# Patient Record
Sex: MALE | Race: WHITE | Employment: OTHER | URBAN - METROPOLITAN AREA
[De-identification: names, ages, dates, MRNs, and addresses within clinical notes are randomized per-mention and may not be internally consistent; named-entity substitution may affect disease eponyms.]

---

## 2017-11-08 ENCOUNTER — TRANSFERRED RECORDS (OUTPATIENT)
Dept: HEALTH INFORMATION MANAGEMENT | Facility: CLINIC | Age: 82
End: 2017-11-08

## 2018-01-01 ENCOUNTER — APPOINTMENT (OUTPATIENT)
Dept: PHYSICAL THERAPY | Facility: CLINIC | Age: 83
DRG: 689 | End: 2018-01-01
Attending: HOSPITALIST
Payer: MEDICARE

## 2018-01-01 ENCOUNTER — TELEPHONE (OUTPATIENT)
Dept: CARDIOLOGY | Facility: CLINIC | Age: 83
End: 2018-01-01

## 2018-01-01 ENCOUNTER — APPOINTMENT (OUTPATIENT)
Dept: PHYSICAL THERAPY | Facility: CLINIC | Age: 83
DRG: 604 | End: 2018-01-01
Payer: MEDICARE

## 2018-01-01 ENCOUNTER — DOCUMENTATION ONLY (OUTPATIENT)
Dept: CARDIOLOGY | Facility: CLINIC | Age: 83
End: 2018-01-01

## 2018-01-01 ENCOUNTER — RECORDS - HEALTHEAST (OUTPATIENT)
Dept: LAB | Facility: CLINIC | Age: 83
End: 2018-01-01

## 2018-01-01 ENCOUNTER — ALLIED HEALTH/NURSE VISIT (OUTPATIENT)
Dept: CARDIOLOGY | Facility: CLINIC | Age: 83
End: 2018-01-01
Payer: MEDICARE

## 2018-01-01 ENCOUNTER — APPOINTMENT (OUTPATIENT)
Dept: SPEECH THERAPY | Facility: CLINIC | Age: 83
DRG: 604 | End: 2018-01-01
Payer: MEDICARE

## 2018-01-01 ENCOUNTER — APPOINTMENT (OUTPATIENT)
Dept: GENERAL RADIOLOGY | Facility: CLINIC | Age: 83
DRG: 604 | End: 2018-01-01
Attending: PHYSICIAN ASSISTANT
Payer: MEDICARE

## 2018-01-01 ENCOUNTER — OFFICE VISIT (OUTPATIENT)
Dept: CARDIOLOGY | Facility: CLINIC | Age: 83
End: 2018-01-01
Attending: INTERNAL MEDICINE
Payer: MEDICARE

## 2018-01-01 ENCOUNTER — DOCUMENTATION ONLY (OUTPATIENT)
Dept: CARE COORDINATION | Facility: CLINIC | Age: 83
End: 2018-01-01

## 2018-01-01 ENCOUNTER — HOSPITAL ENCOUNTER (INPATIENT)
Facility: CLINIC | Age: 83
LOS: 4 days | Discharge: HOME HEALTH | DRG: 689 | End: 2018-11-29
Attending: EMERGENCY MEDICINE | Admitting: HOSPITALIST
Payer: MEDICARE

## 2018-01-01 ENCOUNTER — TRANSFERRED RECORDS (OUTPATIENT)
Dept: HEALTH INFORMATION MANAGEMENT | Facility: CLINIC | Age: 83
End: 2018-01-01

## 2018-01-01 ENCOUNTER — APPOINTMENT (OUTPATIENT)
Dept: CT IMAGING | Facility: CLINIC | Age: 83
DRG: 604 | End: 2018-01-01
Attending: EMERGENCY MEDICINE
Payer: MEDICARE

## 2018-01-01 ENCOUNTER — APPOINTMENT (OUTPATIENT)
Dept: GENERAL RADIOLOGY | Facility: CLINIC | Age: 83
DRG: 689 | End: 2018-01-01
Attending: EMERGENCY MEDICINE
Payer: MEDICARE

## 2018-01-01 ENCOUNTER — APPOINTMENT (OUTPATIENT)
Dept: SPEECH THERAPY | Facility: CLINIC | Age: 83
DRG: 604 | End: 2018-01-01
Attending: HOSPITALIST
Payer: MEDICARE

## 2018-01-01 ENCOUNTER — APPOINTMENT (OUTPATIENT)
Dept: OCCUPATIONAL THERAPY | Facility: CLINIC | Age: 83
DRG: 604 | End: 2018-01-01
Payer: MEDICARE

## 2018-01-01 ENCOUNTER — HOSPITAL ENCOUNTER (INPATIENT)
Facility: CLINIC | Age: 83
LOS: 6 days | Discharge: HOME HEALTH | DRG: 604 | End: 2018-10-18
Attending: EMERGENCY MEDICINE | Admitting: HOSPITALIST
Payer: MEDICARE

## 2018-01-01 ENCOUNTER — APPOINTMENT (OUTPATIENT)
Dept: PHYSICAL THERAPY | Facility: CLINIC | Age: 83
DRG: 604 | End: 2018-01-01
Attending: HOSPITALIST
Payer: MEDICARE

## 2018-01-01 ENCOUNTER — APPOINTMENT (OUTPATIENT)
Dept: SPEECH THERAPY | Facility: CLINIC | Age: 83
DRG: 689 | End: 2018-01-01
Attending: HOSPITALIST
Payer: MEDICARE

## 2018-01-01 ENCOUNTER — OFFICE VISIT (OUTPATIENT)
Dept: CARDIOLOGY | Facility: CLINIC | Age: 83
End: 2018-01-01
Payer: MEDICARE

## 2018-01-01 ENCOUNTER — APPOINTMENT (OUTPATIENT)
Dept: PHYSICAL THERAPY | Facility: CLINIC | Age: 83
DRG: 689 | End: 2018-01-01
Payer: MEDICARE

## 2018-01-01 ENCOUNTER — CARE COORDINATION (OUTPATIENT)
Dept: CARDIOLOGY | Facility: CLINIC | Age: 83
End: 2018-01-01

## 2018-01-01 ENCOUNTER — APPOINTMENT (OUTPATIENT)
Dept: GENERAL RADIOLOGY | Facility: CLINIC | Age: 83
DRG: 604 | End: 2018-01-01
Attending: EMERGENCY MEDICINE
Payer: MEDICARE

## 2018-01-01 ENCOUNTER — APPOINTMENT (OUTPATIENT)
Dept: GENERAL RADIOLOGY | Facility: CLINIC | Age: 83
DRG: 604 | End: 2018-01-01
Attending: HOSPITALIST
Payer: MEDICARE

## 2018-01-01 ENCOUNTER — APPOINTMENT (OUTPATIENT)
Dept: SPEECH THERAPY | Facility: CLINIC | Age: 83
DRG: 689 | End: 2018-01-01
Payer: MEDICARE

## 2018-01-01 ENCOUNTER — APPOINTMENT (OUTPATIENT)
Dept: OCCUPATIONAL THERAPY | Facility: CLINIC | Age: 83
DRG: 604 | End: 2018-01-01
Attending: HOSPITALIST
Payer: MEDICARE

## 2018-01-01 ENCOUNTER — APPOINTMENT (OUTPATIENT)
Dept: CARDIOLOGY | Facility: CLINIC | Age: 83
DRG: 604 | End: 2018-01-01
Attending: HOSPITALIST
Payer: MEDICARE

## 2018-01-01 VITALS
TEMPERATURE: 96.8 F | HEART RATE: 66 BPM | WEIGHT: 167.55 LBS | OXYGEN SATURATION: 94 % | BODY MASS INDEX: 26.93 KG/M2 | DIASTOLIC BLOOD PRESSURE: 45 MMHG | SYSTOLIC BLOOD PRESSURE: 128 MMHG | RESPIRATION RATE: 16 BRPM | HEIGHT: 66 IN

## 2018-01-01 VITALS
HEART RATE: 77 BPM | BODY MASS INDEX: 18.08 KG/M2 | RESPIRATION RATE: 16 BRPM | OXYGEN SATURATION: 94 % | WEIGHT: 119.3 LBS | SYSTOLIC BLOOD PRESSURE: 106 MMHG | HEIGHT: 68 IN | DIASTOLIC BLOOD PRESSURE: 48 MMHG | TEMPERATURE: 98 F

## 2018-01-01 VITALS
BODY MASS INDEX: 23.22 KG/M2 | DIASTOLIC BLOOD PRESSURE: 60 MMHG | OXYGEN SATURATION: 95 % | WEIGHT: 153.2 LBS | SYSTOLIC BLOOD PRESSURE: 122 MMHG | HEART RATE: 68 BPM | HEIGHT: 68 IN

## 2018-01-01 VITALS
OXYGEN SATURATION: 95 % | DIASTOLIC BLOOD PRESSURE: 64 MMHG | BODY MASS INDEX: 23.9 KG/M2 | HEIGHT: 68 IN | HEART RATE: 70 BPM | SYSTOLIC BLOOD PRESSURE: 146 MMHG | WEIGHT: 157.7 LBS

## 2018-01-01 DIAGNOSIS — I47.29 PAROXYSMAL VT (H): ICD-10-CM

## 2018-01-01 DIAGNOSIS — R29.6 MULTIPLE FALLS: ICD-10-CM

## 2018-01-01 DIAGNOSIS — K59.00 CONSTIPATION, UNSPECIFIED CONSTIPATION TYPE: ICD-10-CM

## 2018-01-01 DIAGNOSIS — R07.81 RIB PAIN ON RIGHT SIDE: Primary | ICD-10-CM

## 2018-01-01 DIAGNOSIS — Z98.890 S/P AAA REPAIR: Primary | ICD-10-CM

## 2018-01-01 DIAGNOSIS — Z86.59 HISTORY OF DEMENTIA: ICD-10-CM

## 2018-01-01 DIAGNOSIS — I25.5 ISCHEMIC CARDIOMYOPATHY: ICD-10-CM

## 2018-01-01 DIAGNOSIS — I71.40 ABDOMINAL AORTIC ANEURYSM (AAA) WITHOUT RUPTURE (H): ICD-10-CM
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
## 2018-01-01 DIAGNOSIS — Z86.79 S/P AAA REPAIR: Primary | ICD-10-CM

## 2018-01-01 DIAGNOSIS — I77.9 CAROTID ARTERY DISEASE, UNSPECIFIED LATERALITY (H): Primary | ICD-10-CM

## 2018-01-01 DIAGNOSIS — N39.0 URINARY TRACT INFECTION WITHOUT HEMATURIA, SITE UNSPECIFIED: ICD-10-CM

## 2018-01-01 DIAGNOSIS — R13.10 DYSPHAGIA, UNSPECIFIED TYPE: ICD-10-CM

## 2018-01-01 DIAGNOSIS — Z95.1 S/P CABG (CORONARY ARTERY BYPASS GRAFT): ICD-10-CM

## 2018-01-01 DIAGNOSIS — R11.0 NAUSEA: ICD-10-CM

## 2018-01-01 DIAGNOSIS — Z95.810 ICD (IMPLANTABLE CARDIOVERTER-DEFIBRILLATOR), DUAL, IN SITU: Primary | ICD-10-CM

## 2018-01-01 DIAGNOSIS — Z95.810 ICD (IMPLANTABLE CARDIOVERTER-DEFIBRILLATOR) IN PLACE: ICD-10-CM

## 2018-01-01 DIAGNOSIS — K21.9 GASTROESOPHAGEAL REFLUX DISEASE WITHOUT ESOPHAGITIS: Primary | ICD-10-CM

## 2018-01-01 DIAGNOSIS — S70.01XA HEMATOMA OF RIGHT HIP, INITIAL ENCOUNTER: ICD-10-CM

## 2018-01-01 DIAGNOSIS — I48.20 CHRONIC ATRIAL FIBRILLATION (H): ICD-10-CM

## 2018-01-01 LAB
ABO + RH BLD: NORMAL
ABO + RH BLD: NORMAL
ALBUMIN SERPL-MCNC: 2.3 G/DL (ref 3.4–5)
ALBUMIN SERPL-MCNC: 2.3 G/DL (ref 3.4–5)
ALBUMIN SERPL-MCNC: 2.4 G/DL (ref 3.5–5)
ALBUMIN UR-MCNC: 10 MG/DL
ALBUMIN UR-MCNC: 10 MG/DL
ALP SERPL-CCNC: 50 U/L (ref 45–120)
ALP SERPL-CCNC: 74 U/L (ref 40–150)
ALP SERPL-CCNC: 85 U/L (ref 40–150)
ALT SERPL W P-5'-P-CCNC: 12 U/L (ref 0–45)
ALT SERPL W P-5'-P-CCNC: 12 U/L (ref 0–70)
ALT SERPL W P-5'-P-CCNC: 30 U/L (ref 0–70)
ANION GAP SERPL CALCULATED.3IONS-SCNC: 10.2 MMOL/L (ref 6–17)
ANION GAP SERPL CALCULATED.3IONS-SCNC: 2 MMOL/L (ref 3–14)
ANION GAP SERPL CALCULATED.3IONS-SCNC: 4 MMOL/L (ref 3–14)
ANION GAP SERPL CALCULATED.3IONS-SCNC: 4 MMOL/L (ref 3–14)
ANION GAP SERPL CALCULATED.3IONS-SCNC: 4 MMOL/L (ref 5–18)
ANION GAP SERPL CALCULATED.3IONS-SCNC: 5 MMOL/L (ref 3–14)
ANION GAP SERPL CALCULATED.3IONS-SCNC: 6 MMOL/L (ref 3–14)
ANION GAP SERPL CALCULATED.3IONS-SCNC: 6 MMOL/L (ref 5–18)
ANION GAP SERPL CALCULATED.3IONS-SCNC: 7 MMOL/L (ref 5–18)
ANION GAP SERPL CALCULATED.3IONS-SCNC: 8 MMOL/L (ref 5–18)
APPEARANCE UR: ABNORMAL
APPEARANCE UR: ABNORMAL
APTT PPP: 33 SEC (ref 22–37)
AST SERPL W P-5'-P-CCNC: 15 U/L (ref 0–45)
AST SERPL W P-5'-P-CCNC: 16 U/L (ref 0–40)
AST SERPL W P-5'-P-CCNC: 26 U/L (ref 0–45)
BACTERIA SPEC CULT: NO GROWTH
BACTERIA SPEC CULT: NO GROWTH
BACTERIA SPEC CULT: NORMAL
BACTERIA SPEC CULT: NORMAL
BASOPHILS # BLD AUTO: 0 10E9/L (ref 0–0.2)
BASOPHILS # BLD AUTO: 0 THOU/UL (ref 0–0.2)
BASOPHILS NFR BLD AUTO: 0 % (ref 0–2)
BASOPHILS NFR BLD AUTO: 0.2 %
BASOPHILS NFR BLD AUTO: 0.3 %
BASOPHILS NFR BLD AUTO: 0.3 %
BILIRUB SERPL-MCNC: 0.2 MG/DL (ref 0.2–1.3)
BILIRUB SERPL-MCNC: 0.4 MG/DL (ref 0.2–1.3)
BILIRUB SERPL-MCNC: 0.6 MG/DL (ref 0–1)
BILIRUB UR QL STRIP: NEGATIVE
BILIRUB UR QL STRIP: NEGATIVE
BLD GP AB SCN SERPL QL: NORMAL
BLD PROD TYP BPU: NORMAL
BLD PROD TYP BPU: NORMAL
BLD UNIT ID BPU: 0
BLOOD BANK CMNT PATIENT-IMP: NORMAL
BLOOD PRODUCT CODE: NORMAL
BPU ID: NORMAL
BUN SERPL-MCNC: 16 MG/DL (ref 7–30)
BUN SERPL-MCNC: 17 MG/DL (ref 8–28)
BUN SERPL-MCNC: 19 MG/DL (ref 7–30)
BUN SERPL-MCNC: 19 MG/DL (ref 7–30)
BUN SERPL-MCNC: 19 MG/DL (ref 8–28)
BUN SERPL-MCNC: 20 MG/DL (ref 7–30)
BUN SERPL-MCNC: 25 MG/DL (ref 8–28)
BUN SERPL-MCNC: 29 MG/DL (ref 7–30)
BUN SERPL-MCNC: 41 MG/DL (ref 7–30)
BUN SERPL-MCNC: 44 MG/DL (ref 7–30)
BUN SERPL-MCNC: 51 MG/DL (ref 7–30)
CALCIUM SERPL-MCNC: 7.9 MG/DL (ref 8.5–10.1)
CALCIUM SERPL-MCNC: 8 MG/DL (ref 8.5–10.1)
CALCIUM SERPL-MCNC: 8.1 MG/DL (ref 8.5–10.1)
CALCIUM SERPL-MCNC: 8.2 MG/DL (ref 8.5–10.1)
CALCIUM SERPL-MCNC: 8.3 MG/DL (ref 8.5–10.1)
CALCIUM SERPL-MCNC: 8.5 MG/DL (ref 8.5–10.5)
CALCIUM SERPL-MCNC: 8.7 MG/DL (ref 8.5–10.5)
CALCIUM SERPL-MCNC: 8.7 MG/DL (ref 8.5–10.5)
CALCIUM SERPL-MCNC: 8.8 MG/DL (ref 8.5–10.5)
CALCIUM SERPL-MCNC: 9 MG/DL (ref 8.5–10.5)
CALCIUM SERPL-MCNC: 9.1 MG/DL (ref 8.5–10.5)
CALCIUM SERPL-MCNC: 9.3 MG/DL (ref 8.5–10.5)
CHLORIDE BLD-SCNC: 102 MMOL/L (ref 98–107)
CHLORIDE BLD-SCNC: 104 MMOL/L (ref 98–107)
CHLORIDE BLD-SCNC: 106 MMOL/L (ref 98–107)
CHLORIDE BLD-SCNC: 107 MMOL/L (ref 98–107)
CHLORIDE BLD-SCNC: 108 MMOL/L (ref 98–107)
CHLORIDE BLD-SCNC: 98 MMOL/L (ref 98–107)
CHLORIDE SERPL-SCNC: 102 MMOL/L (ref 94–109)
CHLORIDE SERPL-SCNC: 104 MMOL/L (ref 98–107)
CHLORIDE SERPL-SCNC: 106 MMOL/L (ref 94–109)
CHLORIDE SERPL-SCNC: 108 MMOL/L (ref 94–109)
CHLORIDE SERPL-SCNC: 108 MMOL/L (ref 94–109)
CHLORIDE SERPL-SCNC: 109 MMOL/L (ref 94–109)
CHLORIDE SERPL-SCNC: 109 MMOL/L (ref 94–109)
CHLORIDE SERPL-SCNC: 113 MMOL/L (ref 94–109)
CK SERPL-CCNC: 47 U/L (ref 30–300)
CO2 SERPL-SCNC: 26 MMOL/L (ref 20–32)
CO2 SERPL-SCNC: 26 MMOL/L (ref 20–32)
CO2 SERPL-SCNC: 27 MMOL/L (ref 20–32)
CO2 SERPL-SCNC: 28 MMOL/L (ref 22–31)
CO2 SERPL-SCNC: 28 MMOL/L (ref 22–31)
CO2 SERPL-SCNC: 31 MMOL/L (ref 22–31)
CO2 SERPL-SCNC: 31 MMOL/L (ref 22–31)
CO2 SERPL-SCNC: 32 MMOL/L (ref 23–29)
CO2 SERPL-SCNC: 33 MMOL/L (ref 20–32)
CO2 SERPL-SCNC: 35 MMOL/L (ref 20–32)
CO2 SERPL-SCNC: 35 MMOL/L (ref 22–31)
CO2 SERPL-SCNC: 37 MMOL/L (ref 20–32)
CO2 SERPL-SCNC: 38 MMOL/L (ref 20–32)
CO2 SERPL-SCNC: 40 MMOL/L (ref 22–31)
COLOR UR AUTO: ABNORMAL
COLOR UR AUTO: YELLOW
CREAT SERPL-MCNC: 0.59 MG/DL (ref 0.7–1.3)
CREAT SERPL-MCNC: 0.67 MG/DL (ref 0.7–1.3)
CREAT SERPL-MCNC: 0.7 MG/DL (ref 0.7–1.3)
CREAT SERPL-MCNC: 0.71 MG/DL (ref 0.66–1.25)
CREAT SERPL-MCNC: 0.72 MG/DL (ref 0.66–1.25)
CREAT SERPL-MCNC: 0.74 MG/DL (ref 0.66–1.25)
CREAT SERPL-MCNC: 0.74 MG/DL (ref 0.7–1.3)
CREAT SERPL-MCNC: 0.75 MG/DL (ref 0.66–1.25)
CREAT SERPL-MCNC: 0.79 MG/DL (ref 0.7–1.3)
CREAT SERPL-MCNC: 0.8 MG/DL (ref 0.66–1.25)
CREAT SERPL-MCNC: 0.81 MG/DL (ref 0.7–1.3)
CREAT SERPL-MCNC: 0.83 MG/DL (ref 0.66–1.25)
CREAT SERPL-MCNC: 0.85 MG/DL (ref 0.66–1.25)
CREAT SERPL-MCNC: 0.91 MG/DL (ref 0.7–1.3)
CREAT SERPL-MCNC: 1.04 MG/DL (ref 0.66–1.25)
CRP SERPL-MCNC: 32.2 MG/L (ref 0–8)
DIFFERENTIAL METHOD BLD: ABNORMAL
EOSINOPHIL # BLD AUTO: 0.3 10E9/L (ref 0–0.7)
EOSINOPHIL # BLD AUTO: 0.4 10E9/L (ref 0–0.7)
EOSINOPHIL # BLD AUTO: 0.4 10E9/L (ref 0–0.7)
EOSINOPHIL # BLD AUTO: 0.8 THOU/UL (ref 0–0.4)
EOSINOPHIL NFR BLD AUTO: 2.6 %
EOSINOPHIL NFR BLD AUTO: 4.5 %
EOSINOPHIL NFR BLD AUTO: 5.7 %
EOSINOPHIL NFR BLD AUTO: 8 % (ref 0–6)
ERYTHROCYTE [DISTWIDTH] IN BLOOD BY AUTOMATED COUNT: 13 % (ref 11–14.5)
ERYTHROCYTE [DISTWIDTH] IN BLOOD BY AUTOMATED COUNT: 13.5 % (ref 10–15)
ERYTHROCYTE [DISTWIDTH] IN BLOOD BY AUTOMATED COUNT: 13.8 % (ref 10–15)
ERYTHROCYTE [DISTWIDTH] IN BLOOD BY AUTOMATED COUNT: 13.9 % (ref 11–14.5)
ERYTHROCYTE [DISTWIDTH] IN BLOOD BY AUTOMATED COUNT: 14 % (ref 11–14.5)
ERYTHROCYTE [DISTWIDTH] IN BLOOD BY AUTOMATED COUNT: 14.5 % (ref 11–14.5)
ERYTHROCYTE [DISTWIDTH] IN BLOOD BY AUTOMATED COUNT: 15.1 % (ref 10–15)
ERYTHROCYTE [DISTWIDTH] IN BLOOD BY AUTOMATED COUNT: 15.2 % (ref 10–15)
ERYTHROCYTE [DISTWIDTH] IN BLOOD BY AUTOMATED COUNT: 15.3 % (ref 11–14.5)
ERYTHROCYTE [DISTWIDTH] IN BLOOD BY AUTOMATED COUNT: 15.6 % (ref 11–14.5)
ERYTHROCYTE [SEDIMENTATION RATE] IN BLOOD BY WESTERGREN METHOD: 27 MM/H (ref 0–20)
FERRITIN SERPL-MCNC: 803 NG/ML (ref 27–300)
GFR SERPL CREATININE-BSD FRML MDRD: 67 ML/MIN/1.7M2
GFR SERPL CREATININE-BSD FRML MDRD: 79 ML/MIN/1.7M2
GFR SERPL CREATININE-BSD FRML MDRD: 85 ML/MIN/1.7M2
GFR SERPL CREATININE-BSD FRML MDRD: 88 ML/MIN/1.7M2
GFR SERPL CREATININE-BSD FRML MDRD: >60 ML/MIN/1.73M2
GFR SERPL CREATININE-BSD FRML MDRD: >90 ML/MIN/1.7M2
GLUCOSE BLD-MCNC: 105 MG/DL (ref 70–125)
GLUCOSE BLD-MCNC: 111 MG/DL (ref 70–125)
GLUCOSE BLD-MCNC: 123 MG/DL (ref 70–125)
GLUCOSE BLD-MCNC: 85 MG/DL (ref 70–125)
GLUCOSE BLD-MCNC: 89 MG/DL (ref 70–125)
GLUCOSE BLD-MCNC: 98 MG/DL (ref 70–125)
GLUCOSE BLDC GLUCOMTR-MCNC: 146 MG/DL (ref 70–99)
GLUCOSE SERPL-MCNC: 101 MG/DL (ref 70–99)
GLUCOSE SERPL-MCNC: 105 MG/DL (ref 70–99)
GLUCOSE SERPL-MCNC: 108 MG/DL (ref 70–99)
GLUCOSE SERPL-MCNC: 119 MG/DL (ref 70–99)
GLUCOSE SERPL-MCNC: 132 MG/DL (ref 70–99)
GLUCOSE SERPL-MCNC: 83 MG/DL (ref 70–99)
GLUCOSE SERPL-MCNC: 87 MG/DL (ref 70–99)
GLUCOSE SERPL-MCNC: 92 MG/DL (ref 70–105)
GLUCOSE UR STRIP-MCNC: NEGATIVE MG/DL
GLUCOSE UR STRIP-MCNC: NEGATIVE MG/DL
HBA1C MFR BLD: 5.7 % (ref 4.2–6.1)
HCT VFR BLD AUTO: 25.3 % (ref 40–53)
HCT VFR BLD AUTO: 25.5 % (ref 40–53)
HCT VFR BLD AUTO: 28.3 % (ref 40–54)
HCT VFR BLD AUTO: 32.2 % (ref 40–54)
HCT VFR BLD AUTO: 32.4 % (ref 40–53)
HCT VFR BLD AUTO: 33 % (ref 40–54)
HCT VFR BLD AUTO: 33.7 % (ref 40–54)
HCT VFR BLD AUTO: 34.8 % (ref 40–53)
HCT VFR BLD AUTO: 37.3 % (ref 40–54)
HCT VFR BLD AUTO: 40.7 % (ref 40–54)
HEMOCCULT STL QL: NEGATIVE
HGB BLD-MCNC: 10.1 G/DL (ref 13.3–17.7)
HGB BLD-MCNC: 10.6 G/DL (ref 14–18)
HGB BLD-MCNC: 10.9 G/DL (ref 13.3–17.7)
HGB BLD-MCNC: 11 G/DL (ref 14–18)
HGB BLD-MCNC: 11.8 G/DL (ref 14–18)
HGB BLD-MCNC: 12.1 G/DL (ref 14–18)
HGB BLD-MCNC: 7.8 G/DL (ref 13.3–17.7)
HGB BLD-MCNC: 7.8 G/DL (ref 13.3–17.7)
HGB BLD-MCNC: 8.4 G/DL (ref 13.3–17.7)
HGB BLD-MCNC: 8.5 G/DL (ref 13.3–17.7)
HGB BLD-MCNC: 8.8 G/DL (ref 13.3–17.7)
HGB BLD-MCNC: 8.8 G/DL (ref 13.3–17.7)
HGB BLD-MCNC: 8.8 G/DL (ref 14–18)
HGB BLD-MCNC: 8.9 G/DL (ref 13.3–17.7)
HGB BLD-MCNC: 8.9 G/DL (ref 13.3–17.7)
HGB BLD-MCNC: 9 G/DL (ref 13.3–17.7)
HGB BLD-MCNC: 9.2 G/DL (ref 13.3–17.7)
HGB BLD-MCNC: 9.3 G/DL (ref 13.3–17.7)
HGB BLD-MCNC: 9.3 G/DL (ref 13.3–17.7)
HGB BLD-MCNC: 9.8 G/DL (ref 14–18)
HGB UR QL STRIP: ABNORMAL
HGB UR QL STRIP: ABNORMAL
HYALINE CASTS #/AREA URNS LPF: 13 /LPF (ref 0–2)
IMM GRANULOCYTES # BLD: 0 10E9/L (ref 0–0.4)
IMM GRANULOCYTES # BLD: 0 10E9/L (ref 0–0.4)
IMM GRANULOCYTES # BLD: 0.1 10E9/L (ref 0–0.4)
IMM GRANULOCYTES NFR BLD: 0.3 %
IMM GRANULOCYTES NFR BLD: 0.4 %
IMM GRANULOCYTES NFR BLD: 0.4 %
INR PPP: 1.18 (ref 0.86–1.14)
INR PPP: 1.23 (ref 0.86–1.14)
INTERPRETATION ECG - MUSE: NORMAL
KETONES UR STRIP-MCNC: NEGATIVE MG/DL
KETONES UR STRIP-MCNC: NEGATIVE MG/DL
LACTATE SERPL-SCNC: 1.3 MMOL/L (ref 0.4–2)
LEUKOCYTE ESTERASE UR QL STRIP: ABNORMAL
LEUKOCYTE ESTERASE UR QL STRIP: ABNORMAL
LYMPHOCYTES # BLD AUTO: 1 10E9/L (ref 0.8–5.3)
LYMPHOCYTES # BLD AUTO: 1.5 10E9/L (ref 0.8–5.3)
LYMPHOCYTES # BLD AUTO: 1.8 10E9/L (ref 0.8–5.3)
LYMPHOCYTES # BLD AUTO: 2.1 THOU/UL (ref 0.8–4.4)
LYMPHOCYTES NFR BLD AUTO: 13.4 %
LYMPHOCYTES NFR BLD AUTO: 15.1 %
LYMPHOCYTES NFR BLD AUTO: 18.7 %
LYMPHOCYTES NFR BLD AUTO: 22 % (ref 20–40)
Lab: NORMAL
MCH RBC QN AUTO: 31.3 PG (ref 27–34)
MCH RBC QN AUTO: 31.9 PG (ref 27–34)
MCH RBC QN AUTO: 32 PG (ref 27–34)
MCH RBC QN AUTO: 32.2 PG (ref 26.5–33)
MCH RBC QN AUTO: 32.3 PG (ref 26.5–33)
MCH RBC QN AUTO: 32.4 PG (ref 26.5–33)
MCH RBC QN AUTO: 32.5 PG (ref 27–34)
MCH RBC QN AUTO: 32.6 PG (ref 26.5–33)
MCH RBC QN AUTO: 32.9 PG (ref 27–34)
MCH RBC QN AUTO: 32.9 PG (ref 27–34)
MCHC RBC AUTO-ENTMCNC: 29.7 G/DL (ref 32–36)
MCHC RBC AUTO-ENTMCNC: 30.4 G/DL (ref 32–36)
MCHC RBC AUTO-ENTMCNC: 31.1 G/DL (ref 32–36)
MCHC RBC AUTO-ENTMCNC: 31.2 G/DL (ref 31.5–36.5)
MCHC RBC AUTO-ENTMCNC: 31.3 G/DL (ref 31.5–36.5)
MCHC RBC AUTO-ENTMCNC: 31.6 G/DL (ref 32–36)
MCHC RBC AUTO-ENTMCNC: 32.1 G/DL (ref 32–36)
MCHC RBC AUTO-ENTMCNC: 32.6 G/DL (ref 32–36)
MCHC RBC AUTO-ENTMCNC: 33.3 G/DL (ref 31.5–36.5)
MCHC RBC AUTO-ENTMCNC: 33.6 G/DL (ref 31.5–36.5)
MCV RBC AUTO: 101 FL (ref 80–100)
MCV RBC AUTO: 103 FL (ref 78–100)
MCV RBC AUTO: 103 FL (ref 80–100)
MCV RBC AUTO: 104 FL (ref 78–100)
MCV RBC AUTO: 105 FL (ref 80–100)
MCV RBC AUTO: 105 FL (ref 80–100)
MCV RBC AUTO: 97 FL (ref 78–100)
MCV RBC AUTO: 97 FL (ref 78–100)
MONOCYTES # BLD AUTO: 0.7 10E9/L (ref 0–1.3)
MONOCYTES # BLD AUTO: 0.7 10E9/L (ref 0–1.3)
MONOCYTES # BLD AUTO: 0.7 THOU/UL (ref 0–0.9)
MONOCYTES # BLD AUTO: 1.2 10E9/L (ref 0–1.3)
MONOCYTES NFR BLD AUTO: 10.5 %
MONOCYTES NFR BLD AUTO: 7 % (ref 2–10)
MONOCYTES NFR BLD AUTO: 8.4 %
MONOCYTES NFR BLD AUTO: 9 %
MUCOUS THREADS #/AREA URNS LPF: PRESENT /LPF
NEUTROPHILS # BLD AUTO: 5.2 10E9/L (ref 1.6–8.3)
NEUTROPHILS # BLD AUTO: 5.4 10E9/L (ref 1.6–8.3)
NEUTROPHILS # BLD AUTO: 5.9 THOU/UL (ref 2–7.7)
NEUTROPHILS # BLD AUTO: 8.4 10E9/L (ref 1.6–8.3)
NEUTROPHILS NFR BLD AUTO: 62 % (ref 50–70)
NEUTROPHILS NFR BLD AUTO: 67.7 %
NEUTROPHILS NFR BLD AUTO: 71.2 %
NEUTROPHILS NFR BLD AUTO: 71.3 %
NITRATE UR QL: NEGATIVE
NITRATE UR QL: NEGATIVE
NRBC # BLD AUTO: 0 10*3/UL
NRBC BLD AUTO-RTO: 0 /100
NT-PROBNP SERPL-MCNC: 1857 PG/ML (ref 0–1800)
NUM BPU REQUESTED: 1
PH UR STRIP: 5 PH (ref 5–7)
PH UR STRIP: 5.5 PH (ref 5–7)
PLATELET # BLD AUTO: 140 10E9/L (ref 150–450)
PLATELET # BLD AUTO: 143 THOU/UL (ref 140–440)
PLATELET # BLD AUTO: 146 THOU/UL (ref 140–440)
PLATELET # BLD AUTO: 152 10E9/L (ref 150–450)
PLATELET # BLD AUTO: 152 THOU/UL (ref 140–440)
PLATELET # BLD AUTO: 153 10E9/L (ref 150–450)
PLATELET # BLD AUTO: 170 THOU/UL (ref 140–440)
PLATELET # BLD AUTO: 172 10E9/L (ref 150–450)
PLATELET # BLD AUTO: 200 THOU/UL (ref 140–440)
PLATELET # BLD AUTO: 224 THOU/UL (ref 140–440)
PMV BLD AUTO: 11.2 FL (ref 8.5–12.5)
PMV BLD AUTO: 11.6 FL (ref 8.5–12.5)
PMV BLD AUTO: 12.1 FL (ref 8.5–12.5)
PMV BLD AUTO: 12.2 FL (ref 8.5–12.5)
PMV BLD AUTO: 12.2 FL (ref 8.5–12.5)
PMV BLD AUTO: 12.9 FL (ref 8.5–12.5)
POTASSIUM BLD-SCNC: 3.3 MMOL/L (ref 3.5–5)
POTASSIUM BLD-SCNC: 3.4 MMOL/L (ref 3.5–5)
POTASSIUM BLD-SCNC: 4 MMOL/L (ref 3.5–5)
POTASSIUM BLD-SCNC: 4 MMOL/L (ref 3.5–5)
POTASSIUM BLD-SCNC: 4.2 MMOL/L (ref 3.5–5)
POTASSIUM BLD-SCNC: 4.3 MMOL/L (ref 3.5–5)
POTASSIUM BLD-SCNC: 4.3 MMOL/L (ref 3.5–5)
POTASSIUM SERPL-SCNC: 3.5 MMOL/L (ref 3.4–5.3)
POTASSIUM SERPL-SCNC: 3.6 MMOL/L (ref 3.4–5.3)
POTASSIUM SERPL-SCNC: 3.8 MMOL/L (ref 3.4–5.3)
POTASSIUM SERPL-SCNC: 4 MMOL/L (ref 3.4–5.3)
POTASSIUM SERPL-SCNC: 4.2 MMOL/L (ref 3.5–5.1)
POTASSIUM SERPL-SCNC: 4.3 MMOL/L (ref 3.4–5.3)
POTASSIUM SERPL-SCNC: 4.3 MMOL/L (ref 3.4–5.3)
POTASSIUM SERPL-SCNC: 4.4 MMOL/L (ref 3.4–5.3)
POTASSIUM SERPL-SCNC: 4.5 MMOL/L (ref 3.4–5.3)
PROT SERPL-MCNC: 5.8 G/DL (ref 6.8–8.8)
PROT SERPL-MCNC: 5.8 G/DL (ref 6–8)
PROT SERPL-MCNC: 6.1 G/DL (ref 6.8–8.8)
RBC # BLD AUTO: 2.61 10E12/L (ref 4.4–5.9)
RBC # BLD AUTO: 2.63 10E12/L (ref 4.4–5.9)
RBC # BLD AUTO: 2.75 MILL/UL (ref 4.4–6.2)
RBC # BLD AUTO: 3.07 MILL/UL (ref 4.4–6.2)
RBC # BLD AUTO: 3.14 10E12/L (ref 4.4–5.9)
RBC # BLD AUTO: 3.22 MILL/UL (ref 4.4–6.2)
RBC # BLD AUTO: 3.34 MILL/UL (ref 4.4–6.2)
RBC # BLD AUTO: 3.36 10E12/L (ref 4.4–5.9)
RBC # BLD AUTO: 3.63 MILL/UL (ref 4.4–6.2)
RBC # BLD AUTO: 3.87 MILL/UL (ref 4.4–6.2)
RBC #/AREA URNS AUTO: 10 /HPF (ref 0–2)
RBC #/AREA URNS AUTO: 46 /HPF (ref 0–2)
SODIUM SERPL-SCNC: 140 MMOL/L (ref 136–145)
SODIUM SERPL-SCNC: 141 MMOL/L (ref 133–144)
SODIUM SERPL-SCNC: 141 MMOL/L (ref 133–144)
SODIUM SERPL-SCNC: 141 MMOL/L (ref 136–145)
SODIUM SERPL-SCNC: 141 MMOL/L (ref 136–145)
SODIUM SERPL-SCNC: 142 MMOL/L (ref 136–145)
SODIUM SERPL-SCNC: 143 MMOL/L (ref 136–145)
SODIUM SERPL-SCNC: 144 MMOL/L (ref 133–144)
SODIUM SERPL-SCNC: 145 MMOL/L (ref 133–144)
SODIUM SERPL-SCNC: 145 MMOL/L (ref 133–144)
SODIUM SERPL-SCNC: 145 MMOL/L (ref 136–145)
SODIUM SERPL-SCNC: 145 MMOL/L (ref 136–145)
SODIUM SERPL-SCNC: 147 MMOL/L (ref 133–144)
SODIUM SERPL-SCNC: 147 MMOL/L (ref 133–144)
SOURCE: ABNORMAL
SOURCE: ABNORMAL
SP GR UR STRIP: 1.01 (ref 1–1.03)
SP GR UR STRIP: 1.01 (ref 1–1.03)
SPECIMEN EXP DATE BLD: NORMAL
SPECIMEN SOURCE: NORMAL
TRANSFUSION STATUS PATIENT QL: NORMAL
TRANSFUSION STATUS PATIENT QL: NORMAL
TROPONIN I SERPL-MCNC: 0.02 UG/L (ref 0–0.04)
TROPONIN I SERPL-MCNC: 0.02 UG/L (ref 0–0.04)
TROPONIN I SERPL-MCNC: 0.05 UG/L (ref 0–0.04)
TROPONIN I SERPL-MCNC: 0.07 UG/L (ref 0–0.04)
TROPONIN I SERPL-MCNC: 0.17 UG/L (ref 0–0.04)
TROPONIN I SERPL-MCNC: 0.55 UG/L (ref 0–0.04)
TROPONIN I SERPL-MCNC: 0.69 UG/L (ref 0–0.04)
TROPONIN I SERPL-MCNC: 0.71 UG/L (ref 0–0.04)
UROBILINOGEN UR STRIP-MCNC: NORMAL MG/DL (ref 0–2)
UROBILINOGEN UR STRIP-MCNC: NORMAL MG/DL (ref 0–2)
WBC # BLD AUTO: 11.7 10E9/L (ref 4–11)
WBC # BLD AUTO: 11.8 10E9/L (ref 4–11)
WBC # BLD AUTO: 7.3 10E9/L (ref 4–11)
WBC # BLD AUTO: 8 10E9/L (ref 4–11)
WBC #/AREA URNS AUTO: 153 /HPF (ref 0–5)
WBC #/AREA URNS AUTO: >182 /HPF (ref 0–5)
WBC CLUMPS #/AREA URNS HPF: PRESENT /HPF
WBC: 11.7 THOU/UL (ref 4–11)
WBC: 5.7 THOU/UL (ref 4–11)
WBC: 7.9 THOU/UL (ref 4–11)
WBC: 8.3 THOU/UL (ref 4–11)
WBC: 9.6 THOU/UL (ref 4–11)
WBC: 9.8 THOU/UL (ref 4–11)

## 2018-01-01 PROCEDURE — 84484 ASSAY OF TROPONIN QUANT: CPT | Performed by: HOSPITALIST

## 2018-01-01 PROCEDURE — 25000125 ZZHC RX 250: Performed by: EMERGENCY MEDICINE

## 2018-01-01 PROCEDURE — 25000128 H RX IP 250 OP 636: Performed by: HOSPITALIST

## 2018-01-01 PROCEDURE — A9270 NON-COVERED ITEM OR SERVICE: HCPCS | Mod: GY | Performed by: INTERNAL MEDICINE

## 2018-01-01 PROCEDURE — A9270 NON-COVERED ITEM OR SERVICE: HCPCS | Mod: GY | Performed by: HOSPITALIST

## 2018-01-01 PROCEDURE — 92526 ORAL FUNCTION THERAPY: CPT | Mod: GN | Performed by: SPEECH-LANGUAGE PATHOLOGIST

## 2018-01-01 PROCEDURE — A9270 NON-COVERED ITEM OR SERVICE: HCPCS | Performed by: EMERGENCY MEDICINE

## 2018-01-01 PROCEDURE — 25000131 ZZH RX MED GY IP 250 OP 636 PS 637: Mod: GY | Performed by: HOSPITALIST

## 2018-01-01 PROCEDURE — 21000001 ZZH R&B HEART CARE

## 2018-01-01 PROCEDURE — 40000193 ZZH STATISTIC PT WARD VISIT: Performed by: PHYSICAL THERAPIST

## 2018-01-01 PROCEDURE — 00000146 ZZHCL STATISTIC GLUCOSE BY METER IP

## 2018-01-01 PROCEDURE — 84484 ASSAY OF TROPONIN QUANT: CPT | Performed by: INTERNAL MEDICINE

## 2018-01-01 PROCEDURE — 25000132 ZZH RX MED GY IP 250 OP 250 PS 637: Performed by: HOSPITALIST

## 2018-01-01 PROCEDURE — 85018 HEMOGLOBIN: CPT | Performed by: HOSPITALIST

## 2018-01-01 PROCEDURE — 25500064 ZZH RX 255 OP 636: Performed by: HOSPITALIST

## 2018-01-01 PROCEDURE — 25000132 ZZH RX MED GY IP 250 OP 250 PS 637: Mod: GY | Performed by: PHYSICIAN ASSISTANT

## 2018-01-01 PROCEDURE — 80053 COMPREHEN METABOLIC PANEL: CPT | Performed by: HOSPITALIST

## 2018-01-01 PROCEDURE — 99207 ZZC CDG-MDM COMPONENT: MEETS MODERATE - UP CODED: CPT | Performed by: PHYSICIAN ASSISTANT

## 2018-01-01 PROCEDURE — 40000225 ZZH STATISTIC SLP WARD VISIT: Performed by: SPEECH-LANGUAGE PATHOLOGIST

## 2018-01-01 PROCEDURE — 36415 COLL VENOUS BLD VENIPUNCTURE: CPT | Performed by: HOSPITALIST

## 2018-01-01 PROCEDURE — 97161 PT EVAL LOW COMPLEX 20 MIN: CPT | Mod: GP | Performed by: PHYSICAL THERAPIST

## 2018-01-01 PROCEDURE — 93306 TTE W/DOPPLER COMPLETE: CPT

## 2018-01-01 PROCEDURE — 99358 PROLONG SERVICE W/O CONTACT: CPT | Performed by: NURSE PRACTITIONER

## 2018-01-01 PROCEDURE — 85027 COMPLETE CBC AUTOMATED: CPT | Performed by: HOSPITALIST

## 2018-01-01 PROCEDURE — 25000132 ZZH RX MED GY IP 250 OP 250 PS 637: Mod: GY | Performed by: HOSPITALIST

## 2018-01-01 PROCEDURE — 25000132 ZZH RX MED GY IP 250 OP 250 PS 637: Mod: GY | Performed by: INTERNAL MEDICINE

## 2018-01-01 PROCEDURE — 85025 COMPLETE CBC W/AUTO DIFF WBC: CPT | Performed by: EMERGENCY MEDICINE

## 2018-01-01 PROCEDURE — 80048 BASIC METABOLIC PNL TOTAL CA: CPT | Performed by: HOSPITALIST

## 2018-01-01 PROCEDURE — 86850 RBC ANTIBODY SCREEN: CPT | Performed by: INTERNAL MEDICINE

## 2018-01-01 PROCEDURE — 73502 X-RAY EXAM HIP UNI 2-3 VIEWS: CPT

## 2018-01-01 PROCEDURE — 93306 TTE W/DOPPLER COMPLETE: CPT | Mod: 26 | Performed by: INTERNAL MEDICINE

## 2018-01-01 PROCEDURE — 97116 GAIT TRAINING THERAPY: CPT | Mod: GP | Performed by: PHYSICAL THERAPIST

## 2018-01-01 PROCEDURE — 82272 OCCULT BLD FECES 1-3 TESTS: CPT | Performed by: EMERGENCY MEDICINE

## 2018-01-01 PROCEDURE — 97530 THERAPEUTIC ACTIVITIES: CPT | Mod: GP | Performed by: PHYSICAL THERAPIST

## 2018-01-01 PROCEDURE — 99207 ZZC CDG-MDM COMPONENT: MEETS MODERATE - UP CODED: CPT | Performed by: HOSPITALIST

## 2018-01-01 PROCEDURE — 40000193 ZZH STATISTIC PT WARD VISIT

## 2018-01-01 PROCEDURE — 12000000 ZZH R&B MED SURG/OB

## 2018-01-01 PROCEDURE — 97162 PT EVAL MOD COMPLEX 30 MIN: CPT | Mod: GP

## 2018-01-01 PROCEDURE — 71260 CT THORAX DX C+: CPT

## 2018-01-01 PROCEDURE — 99223 1ST HOSP IP/OBS HIGH 75: CPT | Performed by: NURSE PRACTITIONER

## 2018-01-01 PROCEDURE — 81001 URINALYSIS AUTO W/SCOPE: CPT | Performed by: EMERGENCY MEDICINE

## 2018-01-01 PROCEDURE — 25000128 H RX IP 250 OP 636: Performed by: EMERGENCY MEDICINE

## 2018-01-01 PROCEDURE — P9016 RBC LEUKOCYTES REDUCED: HCPCS | Performed by: INTERNAL MEDICINE

## 2018-01-01 PROCEDURE — 80048 BASIC METABOLIC PNL TOTAL CA: CPT | Performed by: INTERNAL MEDICINE

## 2018-01-01 PROCEDURE — 73552 X-RAY EXAM OF FEMUR 2/>: CPT | Mod: RT

## 2018-01-01 PROCEDURE — 99285 EMERGENCY DEPT VISIT HI MDM: CPT | Mod: 25

## 2018-01-01 PROCEDURE — 99223 1ST HOSP IP/OBS HIGH 75: CPT | Mod: AI | Performed by: HOSPITALIST

## 2018-01-01 PROCEDURE — 70450 CT HEAD/BRAIN W/O DYE: CPT

## 2018-01-01 PROCEDURE — 96365 THER/PROPH/DIAG IV INF INIT: CPT | Mod: 59

## 2018-01-01 PROCEDURE — 99207 ZZC CDG-CHARGE REQUIRED MANUAL ENTRY: CPT | Performed by: INTERNAL MEDICINE

## 2018-01-01 PROCEDURE — 96375 TX/PRO/DX INJ NEW DRUG ADDON: CPT

## 2018-01-01 PROCEDURE — 97530 THERAPEUTIC ACTIVITIES: CPT | Mod: GP

## 2018-01-01 PROCEDURE — 85730 THROMBOPLASTIN TIME PARTIAL: CPT | Performed by: EMERGENCY MEDICINE

## 2018-01-01 PROCEDURE — 71045 X-RAY EXAM CHEST 1 VIEW: CPT

## 2018-01-01 PROCEDURE — 72100 X-RAY EXAM L-S SPINE 2/3 VWS: CPT

## 2018-01-01 PROCEDURE — 85610 PROTHROMBIN TIME: CPT | Performed by: EMERGENCY MEDICINE

## 2018-01-01 PROCEDURE — 40000225 ZZH STATISTIC SLP WARD VISIT

## 2018-01-01 PROCEDURE — A9270 NON-COVERED ITEM OR SERVICE: HCPCS | Mod: GY | Performed by: PHYSICIAN ASSISTANT

## 2018-01-01 PROCEDURE — A9270 NON-COVERED ITEM OR SERVICE: HCPCS | Performed by: HOSPITALIST

## 2018-01-01 PROCEDURE — 99207 ZZC CDG-MDM COMPONENT: MEETS LOW - DOWN CODED: CPT | Performed by: PHYSICIAN ASSISTANT

## 2018-01-01 PROCEDURE — 40000133 ZZH STATISTIC OT WARD VISIT

## 2018-01-01 PROCEDURE — 85652 RBC SED RATE AUTOMATED: CPT | Performed by: EMERGENCY MEDICINE

## 2018-01-01 PROCEDURE — 25000132 ZZH RX MED GY IP 250 OP 250 PS 637: Performed by: EMERGENCY MEDICINE

## 2018-01-01 PROCEDURE — 97166 OT EVAL MOD COMPLEX 45 MIN: CPT | Mod: GO

## 2018-01-01 PROCEDURE — 97535 SELF CARE MNGMENT TRAINING: CPT | Mod: GO

## 2018-01-01 PROCEDURE — 84132 ASSAY OF SERUM POTASSIUM: CPT | Performed by: HOSPITALIST

## 2018-01-01 PROCEDURE — 97530 THERAPEUTIC ACTIVITIES: CPT | Mod: GO

## 2018-01-01 PROCEDURE — 80053 COMPREHEN METABOLIC PANEL: CPT | Performed by: EMERGENCY MEDICINE

## 2018-01-01 PROCEDURE — 92611 MOTION FLUOROSCOPY/SWALLOW: CPT | Mod: GN | Performed by: SPEECH-LANGUAGE PATHOLOGIST

## 2018-01-01 PROCEDURE — 82565 ASSAY OF CREATININE: CPT | Performed by: HOSPITALIST

## 2018-01-01 PROCEDURE — 25000132 ZZH RX MED GY IP 250 OP 250 PS 637: Mod: GY

## 2018-01-01 PROCEDURE — 96365 THER/PROPH/DIAG IV INF INIT: CPT

## 2018-01-01 PROCEDURE — 93000 ELECTROCARDIOGRAM COMPLETE: CPT | Performed by: INTERNAL MEDICINE

## 2018-01-01 PROCEDURE — 36415 COLL VENOUS BLD VENIPUNCTURE: CPT

## 2018-01-01 PROCEDURE — 92526 ORAL FUNCTION THERAPY: CPT | Mod: GN

## 2018-01-01 PROCEDURE — 36415 COLL VENOUS BLD VENIPUNCTURE: CPT | Performed by: EMERGENCY MEDICINE

## 2018-01-01 PROCEDURE — 87086 URINE CULTURE/COLONY COUNT: CPT | Performed by: EMERGENCY MEDICINE

## 2018-01-01 PROCEDURE — 80048 BASIC METABOLIC PNL TOTAL CA: CPT | Performed by: EMERGENCY MEDICINE

## 2018-01-01 PROCEDURE — 85025 COMPLETE CBC W/AUTO DIFF WBC: CPT | Performed by: HOSPITALIST

## 2018-01-01 PROCEDURE — 93010 ELECTROCARDIOGRAM REPORT: CPT | Performed by: INTERNAL MEDICINE

## 2018-01-01 PROCEDURE — 84484 ASSAY OF TROPONIN QUANT: CPT | Performed by: EMERGENCY MEDICINE

## 2018-01-01 PROCEDURE — 93005 ELECTROCARDIOGRAM TRACING: CPT

## 2018-01-01 PROCEDURE — 99233 SBSQ HOSP IP/OBS HIGH 50: CPT | Performed by: HOSPITALIST

## 2018-01-01 PROCEDURE — 25000128 H RX IP 250 OP 636: Performed by: INTERNAL MEDICINE

## 2018-01-01 PROCEDURE — 99205 OFFICE O/P NEW HI 60 MIN: CPT | Performed by: INTERNAL MEDICINE

## 2018-01-01 PROCEDURE — 40000894 ZZH STATISTIC OT IP EVAL DEFER: Performed by: OCCUPATIONAL THERAPIST

## 2018-01-01 PROCEDURE — 93283 PRGRMG EVAL IMPLANTABLE DFB: CPT | Performed by: INTERNAL MEDICINE

## 2018-01-01 PROCEDURE — 36415 COLL VENOUS BLD VENIPUNCTURE: CPT | Performed by: INTERNAL MEDICINE

## 2018-01-01 PROCEDURE — 99214 OFFICE O/P EST MOD 30 MIN: CPT | Performed by: PHYSICIAN ASSISTANT

## 2018-01-01 PROCEDURE — 99239 HOSP IP/OBS DSCHRG MGMT >30: CPT | Performed by: PHYSICIAN ASSISTANT

## 2018-01-01 PROCEDURE — 99232 SBSQ HOSP IP/OBS MODERATE 35: CPT | Performed by: PHYSICIAN ASSISTANT

## 2018-01-01 PROCEDURE — 25000125 ZZHC RX 250: Performed by: HOSPITALIST

## 2018-01-01 PROCEDURE — 73700 CT LOWER EXTREMITY W/O DYE: CPT | Mod: RT

## 2018-01-01 PROCEDURE — 99239 HOSP IP/OBS DSCHRG MGMT >30: CPT | Performed by: HOSPITALIST

## 2018-01-01 PROCEDURE — 92610 EVALUATE SWALLOWING FUNCTION: CPT | Mod: GN

## 2018-01-01 PROCEDURE — 87040 BLOOD CULTURE FOR BACTERIA: CPT | Performed by: EMERGENCY MEDICINE

## 2018-01-01 PROCEDURE — 82550 ASSAY OF CK (CPK): CPT | Performed by: EMERGENCY MEDICINE

## 2018-01-01 PROCEDURE — 86923 COMPATIBILITY TEST ELECTRIC: CPT | Performed by: INTERNAL MEDICINE

## 2018-01-01 PROCEDURE — 83605 ASSAY OF LACTIC ACID: CPT | Performed by: EMERGENCY MEDICINE

## 2018-01-01 PROCEDURE — 99233 SBSQ HOSP IP/OBS HIGH 50: CPT | Performed by: INTERNAL MEDICINE

## 2018-01-01 PROCEDURE — 86900 BLOOD TYPING SEROLOGIC ABO: CPT | Performed by: INTERNAL MEDICINE

## 2018-01-01 PROCEDURE — 86140 C-REACTIVE PROTEIN: CPT | Performed by: EMERGENCY MEDICINE

## 2018-01-01 PROCEDURE — 83880 ASSAY OF NATRIURETIC PEPTIDE: CPT | Performed by: EMERGENCY MEDICINE

## 2018-01-01 PROCEDURE — 86901 BLOOD TYPING SEROLOGIC RH(D): CPT | Performed by: INTERNAL MEDICINE

## 2018-01-01 PROCEDURE — A9270 NON-COVERED ITEM OR SERVICE: HCPCS | Mod: GY

## 2018-01-01 PROCEDURE — 74230 X-RAY XM SWLNG FUNCJ C+: CPT

## 2018-01-01 PROCEDURE — 99233 SBSQ HOSP IP/OBS HIGH 50: CPT | Performed by: PHYSICIAN ASSISTANT

## 2018-01-01 PROCEDURE — 99222 1ST HOSP IP/OBS MODERATE 55: CPT | Performed by: INTERNAL MEDICINE

## 2018-01-01 PROCEDURE — 40000916 ZZH STATISTIC SITTER, NIGHT HOURS

## 2018-01-01 PROCEDURE — 99232 SBSQ HOSP IP/OBS MODERATE 35: CPT | Performed by: HOSPITALIST

## 2018-01-01 PROCEDURE — 99232 SBSQ HOSP IP/OBS MODERATE 35: CPT | Performed by: INTERNAL MEDICINE

## 2018-01-01 RX ORDER — ATORVASTATIN CALCIUM 10 MG/1
10 TABLET, FILM COATED ORAL DAILY
Status: DISCONTINUED | OUTPATIENT
Start: 2018-01-01 | End: 2018-01-01 | Stop reason: HOSPADM

## 2018-01-01 RX ORDER — SUCRALFATE 1 G/1
1 TABLET ORAL 4 TIMES DAILY
Qty: 40 TABLET | Refills: 1 | Status: SHIPPED | OUTPATIENT
Start: 2018-01-01

## 2018-01-01 RX ORDER — ALUMINA, MAGNESIA, AND SIMETHICONE 2400; 2400; 240 MG/30ML; MG/30ML; MG/30ML
30 SUSPENSION ORAL EVERY 4 HOURS PRN
Status: DISCONTINUED | OUTPATIENT
Start: 2018-01-01 | End: 2018-01-01 | Stop reason: HOSPADM

## 2018-01-01 RX ORDER — POLYETHYLENE GLYCOL 3350 17 G/17G
17 POWDER, FOR SOLUTION ORAL DAILY
Status: DISCONTINUED | OUTPATIENT
Start: 2018-01-01 | End: 2018-01-01 | Stop reason: HOSPADM

## 2018-01-01 RX ORDER — RIVASTIGMINE TARTRATE 1.5 MG/1
1.5 CAPSULE ORAL 2 TIMES DAILY WITH MEALS
Status: DISCONTINUED | OUTPATIENT
Start: 2018-01-01 | End: 2018-01-01 | Stop reason: HOSPADM

## 2018-01-01 RX ORDER — FUROSEMIDE 20 MG
20 TABLET ORAL DAILY PRN
Qty: 90 TABLET | Refills: 0 | Status: ON HOLD | OUTPATIENT
Start: 2018-01-01 | End: 2018-01-01

## 2018-01-01 RX ORDER — ONDANSETRON 2 MG/ML
4 INJECTION INTRAMUSCULAR; INTRAVENOUS EVERY 6 HOURS PRN
Status: DISCONTINUED | OUTPATIENT
Start: 2018-01-01 | End: 2018-01-01 | Stop reason: HOSPADM

## 2018-01-01 RX ORDER — POLYETHYLENE GLYCOL 3350 17 G/17G
17 POWDER, FOR SOLUTION ORAL DAILY
Qty: 30 PACKET | Refills: 0 | Status: SHIPPED | OUTPATIENT
Start: 2018-01-01

## 2018-01-01 RX ORDER — FUROSEMIDE 20 MG
20 TABLET ORAL DAILY
Qty: 90 TABLET | Refills: 3 | Status: ON HOLD | OUTPATIENT
Start: 2018-01-01 | End: 2018-01-01

## 2018-01-01 RX ORDER — SCOLOPAMINE TRANSDERMAL SYSTEM 1 MG/1
1 PATCH, EXTENDED RELEASE TRANSDERMAL
Status: DISCONTINUED | OUTPATIENT
Start: 2018-01-01 | End: 2018-01-01

## 2018-01-01 RX ORDER — FUROSEMIDE 10 MG/ML
20 INJECTION INTRAMUSCULAR; INTRAVENOUS ONCE
Status: COMPLETED | OUTPATIENT
Start: 2018-01-01 | End: 2018-01-01

## 2018-01-01 RX ORDER — PROCHLORPERAZINE 25 MG
12.5 SUPPOSITORY, RECTAL RECTAL EVERY 12 HOURS PRN
Status: DISCONTINUED | OUTPATIENT
Start: 2018-01-01 | End: 2018-01-01

## 2018-01-01 RX ORDER — ONDANSETRON 4 MG/1
8 TABLET, ORALLY DISINTEGRATING ORAL EVERY 6 HOURS PRN
Status: DISCONTINUED | OUTPATIENT
Start: 2018-01-01 | End: 2018-01-01 | Stop reason: HOSPADM

## 2018-01-01 RX ORDER — ASPIRIN 81 MG/1
81 TABLET ORAL DAILY
Status: DISCONTINUED | OUTPATIENT
Start: 2018-01-01 | End: 2018-01-01 | Stop reason: HOSPADM

## 2018-01-01 RX ORDER — IOPAMIDOL 755 MG/ML
71 INJECTION, SOLUTION INTRAVASCULAR ONCE
Status: COMPLETED | OUTPATIENT
Start: 2018-01-01 | End: 2018-01-01

## 2018-01-01 RX ORDER — METOCLOPRAMIDE HYDROCHLORIDE 5 MG/ML
5 INJECTION INTRAMUSCULAR; INTRAVENOUS EVERY 6 HOURS PRN
Status: DISCONTINUED | OUTPATIENT
Start: 2018-01-01 | End: 2018-01-01 | Stop reason: HOSPADM

## 2018-01-01 RX ORDER — ACETAMINOPHEN 325 MG/1
650 TABLET ORAL EVERY 4 HOURS PRN
Status: DISCONTINUED | OUTPATIENT
Start: 2018-01-01 | End: 2018-01-01 | Stop reason: HOSPADM

## 2018-01-01 RX ORDER — POTASSIUM CHLORIDE 7.45 MG/ML
10 INJECTION INTRAVENOUS
Status: DISCONTINUED | OUTPATIENT
Start: 2018-01-01 | End: 2018-01-01 | Stop reason: HOSPADM

## 2018-01-01 RX ORDER — FLUORIDE TOOTHPASTE
15 TOOTHPASTE DENTAL 4 TIMES DAILY
Status: DISCONTINUED | OUTPATIENT
Start: 2018-01-01 | End: 2018-01-01 | Stop reason: HOSPADM

## 2018-01-01 RX ORDER — CEFTRIAXONE 1 G/1
1 INJECTION, POWDER, FOR SOLUTION INTRAMUSCULAR; INTRAVENOUS EVERY 24 HOURS
Status: DISCONTINUED | OUTPATIENT
Start: 2018-01-01 | End: 2018-01-01

## 2018-01-01 RX ORDER — ONDANSETRON 4 MG/1
8 TABLET, ORALLY DISINTEGRATING ORAL EVERY 8 HOURS PRN
Qty: 20 TABLET | Refills: 0 | Status: ON HOLD | OUTPATIENT
Start: 2018-01-01 | End: 2018-01-01

## 2018-01-01 RX ORDER — LISINOPRIL 2.5 MG/1
2.5 TABLET ORAL DAILY
Status: DISCONTINUED | OUTPATIENT
Start: 2018-01-01 | End: 2018-01-01 | Stop reason: HOSPADM

## 2018-01-01 RX ORDER — POTASSIUM CHLORIDE 750 MG/1
10 TABLET, EXTENDED RELEASE ORAL EVERY EVENING
Status: ON HOLD | COMMUNITY
End: 2018-01-01

## 2018-01-01 RX ORDER — POLYETHYLENE GLYCOL 3350 17 G/17G
17 POWDER, FOR SOLUTION ORAL DAILY PRN
Status: DISCONTINUED | OUTPATIENT
Start: 2018-01-01 | End: 2018-01-01 | Stop reason: HOSPADM

## 2018-01-01 RX ORDER — FUROSEMIDE 20 MG
20 TABLET ORAL DAILY
Status: DISCONTINUED | OUTPATIENT
Start: 2018-01-01 | End: 2018-01-01

## 2018-01-01 RX ORDER — LISINOPRIL 2.5 MG/1
2.5 TABLET ORAL EVERY EVENING
Status: DISCONTINUED | OUTPATIENT
Start: 2018-01-01 | End: 2018-01-01 | Stop reason: HOSPADM

## 2018-01-01 RX ORDER — SUCRALFATE ORAL 1 G/10ML
1 SUSPENSION ORAL
Status: DISCONTINUED | OUTPATIENT
Start: 2018-01-01 | End: 2018-01-01 | Stop reason: HOSPADM

## 2018-01-01 RX ORDER — POTASSIUM CHLORIDE 1.5 G/1.58G
20-40 POWDER, FOR SOLUTION ORAL
Status: DISCONTINUED | OUTPATIENT
Start: 2018-01-01 | End: 2018-01-01 | Stop reason: HOSPADM

## 2018-01-01 RX ORDER — PROCHLORPERAZINE MALEATE 5 MG
5 TABLET ORAL EVERY 6 HOURS PRN
Status: DISCONTINUED | OUTPATIENT
Start: 2018-01-01 | End: 2018-01-01

## 2018-01-01 RX ORDER — FUROSEMIDE 20 MG
20 TABLET ORAL DAILY
Qty: 90 TABLET | Refills: 0 | Status: SHIPPED | OUTPATIENT
Start: 2018-01-01

## 2018-01-01 RX ORDER — ASPIRIN 81 MG
100 TABLET, DELAYED RELEASE (ENTERIC COATED) ORAL EVERY EVENING
COMMUNITY

## 2018-01-01 RX ORDER — CARVEDILOL 3.12 MG/1
3.12 TABLET ORAL 2 TIMES DAILY WITH MEALS
Status: DISCONTINUED | OUTPATIENT
Start: 2018-01-01 | End: 2018-01-01 | Stop reason: HOSPADM

## 2018-01-01 RX ORDER — TRAZODONE HYDROCHLORIDE 50 MG/1
50 TABLET, FILM COATED ORAL AT BEDTIME
COMMUNITY

## 2018-01-01 RX ORDER — TRAZODONE HYDROCHLORIDE 50 MG/1
50 TABLET, FILM COATED ORAL AT BEDTIME
Status: DISCONTINUED | OUTPATIENT
Start: 2018-01-01 | End: 2018-01-01 | Stop reason: HOSPADM

## 2018-01-01 RX ORDER — LISINOPRIL 2.5 MG/1
2.5 TABLET ORAL DAILY
Qty: 90 TABLET | Refills: 3 | Status: SHIPPED | OUTPATIENT
Start: 2018-01-01

## 2018-01-01 RX ORDER — CARVEDILOL 3.12 MG/1
3.12 TABLET ORAL 2 TIMES DAILY WITH MEALS
COMMUNITY
End: 2018-01-01

## 2018-01-01 RX ORDER — POTASSIUM CHLORIDE 1500 MG/1
20-40 TABLET, EXTENDED RELEASE ORAL
Status: DISCONTINUED | OUTPATIENT
Start: 2018-01-01 | End: 2018-01-01 | Stop reason: HOSPADM

## 2018-01-01 RX ORDER — POTASSIUM CHLORIDE 1500 MG/1
20 TABLET, EXTENDED RELEASE ORAL ONCE
Status: DISCONTINUED | OUTPATIENT
Start: 2018-01-01 | End: 2018-01-01

## 2018-01-01 RX ORDER — METOCLOPRAMIDE 5 MG/1
5 TABLET ORAL EVERY 6 HOURS PRN
Status: DISCONTINUED | OUTPATIENT
Start: 2018-01-01 | End: 2018-01-01 | Stop reason: HOSPADM

## 2018-01-01 RX ORDER — ASPIRIN 81 MG/1
162 TABLET ORAL DAILY
Status: DISCONTINUED | OUTPATIENT
Start: 2018-01-01 | End: 2018-01-01

## 2018-01-01 RX ORDER — POTASSIUM CL/LIDO/0.9 % NACL 10MEQ/0.1L
10 INTRAVENOUS SOLUTION, PIGGYBACK (ML) INTRAVENOUS
Status: DISCONTINUED | OUTPATIENT
Start: 2018-01-01 | End: 2018-01-01 | Stop reason: HOSPADM

## 2018-01-01 RX ORDER — NALOXONE HYDROCHLORIDE 0.4 MG/ML
.1-.4 INJECTION, SOLUTION INTRAMUSCULAR; INTRAVENOUS; SUBCUTANEOUS
Status: DISCONTINUED | OUTPATIENT
Start: 2018-01-01 | End: 2018-01-01 | Stop reason: HOSPADM

## 2018-01-01 RX ORDER — FLUORIDE TOOTHPASTE
15 TOOTHPASTE DENTAL 4 TIMES DAILY
Qty: 1000 ML | Refills: 0 | Status: ON HOLD | OUTPATIENT
Start: 2018-01-01 | End: 2018-01-01

## 2018-01-01 RX ORDER — CARVEDILOL 6.25 MG/1
3.12 TABLET ORAL 2 TIMES DAILY WITH MEALS
COMMUNITY

## 2018-01-01 RX ORDER — ONDANSETRON 2 MG/ML
4 INJECTION INTRAMUSCULAR; INTRAVENOUS EVERY 6 HOURS PRN
Status: DISCONTINUED | OUTPATIENT
Start: 2018-01-01 | End: 2018-01-01

## 2018-01-01 RX ORDER — ACETAMINOPHEN 500 MG
1000 TABLET ORAL EVERY 8 HOURS PRN
Status: DISCONTINUED | OUTPATIENT
Start: 2018-01-01 | End: 2018-01-01

## 2018-01-01 RX ORDER — PROCHLORPERAZINE MALEATE 5 MG
5-10 TABLET ORAL EVERY 6 HOURS PRN
Status: DISCONTINUED | OUTPATIENT
Start: 2018-01-01 | End: 2018-01-01 | Stop reason: HOSPADM

## 2018-01-01 RX ORDER — MIRTAZAPINE 7.5 MG/1
7.5 TABLET, FILM COATED ORAL AT BEDTIME
Status: DISCONTINUED | OUTPATIENT
Start: 2018-01-01 | End: 2018-01-01 | Stop reason: HOSPADM

## 2018-01-01 RX ORDER — ALUMINA, MAGNESIA, AND SIMETHICONE 2400; 2400; 240 MG/30ML; MG/30ML; MG/30ML
SUSPENSION ORAL
Status: COMPLETED
Start: 2018-01-01 | End: 2018-01-01

## 2018-01-01 RX ORDER — ACETAMINOPHEN 500 MG
1000 TABLET ORAL 3 TIMES DAILY PRN
Status: DISCONTINUED | OUTPATIENT
Start: 2018-01-01 | End: 2018-01-01 | Stop reason: HOSPADM

## 2018-01-01 RX ORDER — CEFTRIAXONE 1 G/1
1 INJECTION, POWDER, FOR SOLUTION INTRAMUSCULAR; INTRAVENOUS ONCE
Status: COMPLETED | OUTPATIENT
Start: 2018-01-01 | End: 2018-01-01

## 2018-01-01 RX ORDER — DOCUSATE SODIUM 100 MG/1
100 CAPSULE, LIQUID FILLED ORAL EVERY EVENING
Status: DISCONTINUED | OUTPATIENT
Start: 2018-01-01 | End: 2018-01-01 | Stop reason: HOSPADM

## 2018-01-01 RX ORDER — CEFTRIAXONE 2 G/1
2 INJECTION, POWDER, FOR SOLUTION INTRAMUSCULAR; INTRAVENOUS ONCE
Status: COMPLETED | OUTPATIENT
Start: 2018-01-01 | End: 2018-01-01

## 2018-01-01 RX ORDER — SENNOSIDES 8.6 MG
1-2 TABLET ORAL 2 TIMES DAILY
Status: DISCONTINUED | OUTPATIENT
Start: 2018-01-01 | End: 2018-01-01 | Stop reason: HOSPADM

## 2018-01-01 RX ORDER — POTASSIUM CHLORIDE 29.8 MG/ML
20 INJECTION INTRAVENOUS
Status: DISCONTINUED | OUTPATIENT
Start: 2018-01-01 | End: 2018-01-01 | Stop reason: HOSPADM

## 2018-01-01 RX ORDER — CLOPIDOGREL BISULFATE 75 MG/1
75 TABLET ORAL DAILY
Qty: 90 TABLET | Refills: 3 | Status: ON HOLD | OUTPATIENT
Start: 2018-01-01 | End: 2018-01-01

## 2018-01-01 RX ORDER — SUCRALFATE 1 G/1
1 TABLET ORAL 4 TIMES DAILY
Status: DISCONTINUED | OUTPATIENT
Start: 2018-01-01 | End: 2018-01-01 | Stop reason: HOSPADM

## 2018-01-01 RX ORDER — PROCHLORPERAZINE 25 MG
12.5 SUPPOSITORY, RECTAL RECTAL EVERY 12 HOURS PRN
Status: DISCONTINUED | OUTPATIENT
Start: 2018-01-01 | End: 2018-01-01 | Stop reason: HOSPADM

## 2018-01-01 RX ORDER — ACETAMINOPHEN 325 MG/1
650 TABLET ORAL ONCE
Status: COMPLETED | OUTPATIENT
Start: 2018-01-01 | End: 2018-01-01

## 2018-01-01 RX ORDER — ONDANSETRON 8 MG/1
8 TABLET, ORALLY DISINTEGRATING ORAL EVERY 8 HOURS PRN
COMMUNITY

## 2018-01-01 RX ORDER — POTASSIUM CHLORIDE 20MEQ/15ML
20 LIQUID (ML) ORAL ONCE
Status: COMPLETED | OUTPATIENT
Start: 2018-01-01 | End: 2018-01-01

## 2018-01-01 RX ORDER — ONDANSETRON 4 MG/1
4 TABLET, ORALLY DISINTEGRATING ORAL EVERY 6 HOURS PRN
Status: DISCONTINUED | OUTPATIENT
Start: 2018-01-01 | End: 2018-01-01

## 2018-01-01 RX ORDER — RIVASTIGMINE TARTRATE 1.5 MG/1
1.5 CAPSULE ORAL 2 TIMES DAILY WITH MEALS
COMMUNITY

## 2018-01-01 RX ORDER — CARVEDILOL 3.12 MG/1
3.12 TABLET ORAL 2 TIMES DAILY
Status: DISCONTINUED | OUTPATIENT
Start: 2018-01-01 | End: 2018-01-01 | Stop reason: HOSPADM

## 2018-01-01 RX ORDER — ASPIRIN 81 MG/1
81 TABLET ORAL DAILY
Qty: 90 TABLET | Refills: 3 | Status: SHIPPED | OUTPATIENT
Start: 2018-01-01

## 2018-01-01 RX ORDER — FUROSEMIDE 10 MG/ML
20 INJECTION INTRAMUSCULAR; INTRAVENOUS DAILY
Status: DISCONTINUED | OUTPATIENT
Start: 2018-01-01 | End: 2018-01-01 | Stop reason: HOSPADM

## 2018-01-01 RX ORDER — FLUORIDE TOOTHPASTE
15 TOOTHPASTE DENTAL 4 TIMES DAILY
Status: DISCONTINUED | OUTPATIENT
Start: 2018-01-01 | End: 2018-01-01

## 2018-01-01 RX ORDER — ACETAMINOPHEN 500 MG
1000 TABLET ORAL 3 TIMES DAILY PRN
COMMUNITY

## 2018-01-01 RX ORDER — ONDANSETRON 4 MG/1
4 TABLET, ORALLY DISINTEGRATING ORAL EVERY 6 HOURS PRN
Status: DISCONTINUED | OUTPATIENT
Start: 2018-01-01 | End: 2018-01-01 | Stop reason: HOSPADM

## 2018-01-01 RX ORDER — ATORVASTATIN CALCIUM 10 MG/1
10 TABLET, FILM COATED ORAL DAILY
COMMUNITY

## 2018-01-01 RX ORDER — BARIUM SULFATE 400 MG/ML
SUSPENSION ORAL ONCE
Status: COMPLETED | OUTPATIENT
Start: 2018-01-01 | End: 2018-01-01

## 2018-01-01 RX ADMIN — RANITIDINE 150 MG: 150 TABLET ORAL at 16:27

## 2018-01-01 RX ADMIN — SUCRALFATE 1 G: 1 TABLET ORAL at 08:54

## 2018-01-01 RX ADMIN — RIVASTIGMINE TARTRATE 1.5 MG: 1.5 CAPSULE ORAL at 17:06

## 2018-01-01 RX ADMIN — Medication 15 ML: at 09:34

## 2018-01-01 RX ADMIN — RIVASTIGMINE TARTRATE 1.5 MG: 1.5 CAPSULE ORAL at 10:47

## 2018-01-01 RX ADMIN — LISINOPRIL 2.5 MG: 2.5 TABLET ORAL at 08:43

## 2018-01-01 RX ADMIN — SENNOSIDES 1 TABLET: 8.6 TABLET, FILM COATED ORAL at 10:00

## 2018-01-01 RX ADMIN — ACETAMINOPHEN 1000 MG: 500 TABLET, FILM COATED ORAL at 10:47

## 2018-01-01 RX ADMIN — RIVASTIGMINE TARTRATE 1.5 MG: 1.5 CAPSULE ORAL at 10:00

## 2018-01-01 RX ADMIN — TRAZODONE HYDROCHLORIDE 50 MG: 50 TABLET ORAL at 21:41

## 2018-01-01 RX ADMIN — RIVASTIGMINE TARTRATE 1.5 MG: 1.5 CAPSULE ORAL at 09:34

## 2018-01-01 RX ADMIN — Medication 2.5 MG: at 20:19

## 2018-01-01 RX ADMIN — ASPIRIN 81 MG: 81 TABLET, COATED ORAL at 08:12

## 2018-01-01 RX ADMIN — SUCRALFATE 1 G: 1 TABLET ORAL at 12:36

## 2018-01-01 RX ADMIN — ATORVASTATIN CALCIUM 10 MG: 10 TABLET, FILM COATED ORAL at 10:15

## 2018-01-01 RX ADMIN — TRAZODONE HYDROCHLORIDE 50 MG: 50 TABLET ORAL at 20:27

## 2018-01-01 RX ADMIN — RIVASTIGMINE TARTRATE 1.5 MG: 1.5 CAPSULE ORAL at 21:40

## 2018-01-01 RX ADMIN — ACETAMINOPHEN 1000 MG: 500 TABLET, FILM COATED ORAL at 22:58

## 2018-01-01 RX ADMIN — MIRTAZAPINE 7.5 MG: 7.5 TABLET ORAL at 19:51

## 2018-01-01 RX ADMIN — CARVEDILOL 3.12 MG: 3.12 TABLET, FILM COATED ORAL at 08:43

## 2018-01-01 RX ADMIN — ATORVASTATIN CALCIUM 10 MG: 10 TABLET, FILM COATED ORAL at 09:57

## 2018-01-01 RX ADMIN — MIRTAZAPINE 7.5 MG: 7.5 TABLET ORAL at 21:40

## 2018-01-01 RX ADMIN — RIVASTIGMINE TARTRATE 1.5 MG: 1.5 CAPSULE ORAL at 09:57

## 2018-01-01 RX ADMIN — RIVASTIGMINE TARTRATE 1.5 MG: 1.5 CAPSULE ORAL at 08:58

## 2018-01-01 RX ADMIN — ALUMINUM HYDROXIDE, MAGNESIUM HYDROXIDE, AND DIMETHICONE 30 ML: 400; 400; 40 SUSPENSION ORAL at 16:13

## 2018-01-01 RX ADMIN — ASPIRIN 162 MG: 81 TABLET, COATED ORAL at 10:56

## 2018-01-01 RX ADMIN — PROCHLORPERAZINE EDISYLATE 5 MG: 5 INJECTION INTRAMUSCULAR; INTRAVENOUS at 22:28

## 2018-01-01 RX ADMIN — IOPAMIDOL 71 ML: 755 INJECTION, SOLUTION INTRAVENOUS at 19:49

## 2018-01-01 RX ADMIN — Medication 15 ML: at 10:02

## 2018-01-01 RX ADMIN — SUCRALFATE 1 G: 1 TABLET ORAL at 21:42

## 2018-01-01 RX ADMIN — CARVEDILOL 3.12 MG: 3.12 TABLET, FILM COATED ORAL at 10:15

## 2018-01-01 RX ADMIN — TRAZODONE HYDROCHLORIDE 50 MG: 50 TABLET ORAL at 20:22

## 2018-01-01 RX ADMIN — ATORVASTATIN CALCIUM 10 MG: 10 TABLET, FILM COATED ORAL at 08:43

## 2018-01-01 RX ADMIN — FUROSEMIDE 20 MG: 10 INJECTION, SOLUTION INTRAVENOUS at 08:54

## 2018-01-01 RX ADMIN — RANITIDINE 150 MG: 150 TABLET ORAL at 08:01

## 2018-01-01 RX ADMIN — LISINOPRIL 2.5 MG: 2.5 TABLET ORAL at 09:33

## 2018-01-01 RX ADMIN — FUROSEMIDE 20 MG: 20 TABLET ORAL at 10:47

## 2018-01-01 RX ADMIN — Medication 15 ML: at 12:57

## 2018-01-01 RX ADMIN — SUCRALFATE 1 G: 1 TABLET ORAL at 17:04

## 2018-01-01 RX ADMIN — LISINOPRIL 2.5 MG: 2.5 TABLET ORAL at 16:27

## 2018-01-01 RX ADMIN — RIVASTIGMINE TARTRATE 1.5 MG: 1.5 CAPSULE ORAL at 18:59

## 2018-01-01 RX ADMIN — CARVEDILOL 3.12 MG: 3.12 TABLET, FILM COATED ORAL at 10:00

## 2018-01-01 RX ADMIN — ATORVASTATIN CALCIUM 10 MG: 10 TABLET, FILM COATED ORAL at 10:00

## 2018-01-01 RX ADMIN — SODIUM CHLORIDE 500 ML: 9 INJECTION, SOLUTION INTRAVENOUS at 19:27

## 2018-01-01 RX ADMIN — RIVASTIGMINE TARTRATE 1.5 MG: 1.5 CAPSULE ORAL at 16:58

## 2018-01-01 RX ADMIN — ASPIRIN 81 MG: 81 TABLET, COATED ORAL at 08:43

## 2018-01-01 RX ADMIN — CEFTRIAXONE SODIUM 1 G: 1 INJECTION, POWDER, FOR SOLUTION INTRAMUSCULAR; INTRAVENOUS at 14:23

## 2018-01-01 RX ADMIN — CEFTRIAXONE SODIUM 1 G: 1 INJECTION, POWDER, FOR SOLUTION INTRAMUSCULAR; INTRAVENOUS at 14:18

## 2018-01-01 RX ADMIN — MIRTAZAPINE 7.5 MG: 7.5 TABLET ORAL at 21:35

## 2018-01-01 RX ADMIN — PROCHLORPERAZINE EDISYLATE 5 MG: 5 INJECTION INTRAMUSCULAR; INTRAVENOUS at 15:29

## 2018-01-01 RX ADMIN — LISINOPRIL 2.5 MG: 2.5 TABLET ORAL at 10:00

## 2018-01-01 RX ADMIN — CARVEDILOL 3.12 MG: 3.12 TABLET, FILM COATED ORAL at 18:06

## 2018-01-01 RX ADMIN — ASPIRIN 81 MG: 81 TABLET, COATED ORAL at 08:54

## 2018-01-01 RX ADMIN — TRAZODONE HYDROCHLORIDE 50 MG: 50 TABLET ORAL at 20:19

## 2018-01-01 RX ADMIN — ACETAMINOPHEN 1000 MG: 500 TABLET, FILM COATED ORAL at 10:38

## 2018-01-01 RX ADMIN — ACETAMINOPHEN 650 MG: 325 TABLET, FILM COATED ORAL at 16:47

## 2018-01-01 RX ADMIN — DOCUSATE SODIUM 100 MG: 100 CAPSULE, LIQUID FILLED ORAL at 20:18

## 2018-01-01 RX ADMIN — CEFTRIAXONE 1 G: 1 INJECTION, POWDER, FOR SOLUTION INTRAMUSCULAR; INTRAVENOUS at 22:15

## 2018-01-01 RX ADMIN — FUROSEMIDE 20 MG: 10 INJECTION, SOLUTION INTRAVENOUS at 17:04

## 2018-01-01 RX ADMIN — SUCRALFATE 1 G: 1 SUSPENSION ORAL at 12:57

## 2018-01-01 RX ADMIN — CARVEDILOL 3.12 MG: 3.12 TABLET, FILM COATED ORAL at 19:52

## 2018-01-01 RX ADMIN — RIVASTIGMINE TARTRATE 1.5 MG: 1.5 CAPSULE ORAL at 17:04

## 2018-01-01 RX ADMIN — Medication 2.5 MG: at 15:18

## 2018-01-01 RX ADMIN — SCOPALAMINE 1 PATCH: 1 PATCH, EXTENDED RELEASE TRANSDERMAL at 02:45

## 2018-01-01 RX ADMIN — DOCUSATE SODIUM 100 MG: 100 CAPSULE, LIQUID FILLED ORAL at 20:35

## 2018-01-01 RX ADMIN — Medication 2.5 MG: at 04:21

## 2018-01-01 RX ADMIN — LISINOPRIL 2.5 MG: 2.5 TABLET ORAL at 21:42

## 2018-01-01 RX ADMIN — FUROSEMIDE 20 MG: 10 INJECTION, SOLUTION INTRAVENOUS at 10:39

## 2018-01-01 RX ADMIN — MIRTAZAPINE 7.5 MG: 7.5 TABLET ORAL at 20:22

## 2018-01-01 RX ADMIN — TRAZODONE HYDROCHLORIDE 50 MG: 50 TABLET ORAL at 20:44

## 2018-01-01 RX ADMIN — LISINOPRIL 2.5 MG: 2.5 TABLET ORAL at 17:20

## 2018-01-01 RX ADMIN — RIVASTIGMINE TARTRATE 1.5 MG: 1.5 CAPSULE ORAL at 16:34

## 2018-01-01 RX ADMIN — RANITIDINE 150 MG: 150 TABLET ORAL at 08:12

## 2018-01-01 RX ADMIN — Medication 15 ML: at 14:31

## 2018-01-01 RX ADMIN — ONDANSETRON 4 MG: 2 INJECTION INTRAMUSCULAR; INTRAVENOUS at 14:26

## 2018-01-01 RX ADMIN — ASPIRIN 81 MG: 81 TABLET, COATED ORAL at 09:34

## 2018-01-01 RX ADMIN — SCOPALAMINE 1 PATCH: 1 PATCH, EXTENDED RELEASE TRANSDERMAL at 03:15

## 2018-01-01 RX ADMIN — Medication 2.5 MG: at 08:49

## 2018-01-01 RX ADMIN — BARIUM SULFATE 15 ML: 400 SUSPENSION ORAL at 09:31

## 2018-01-01 RX ADMIN — TRAZODONE HYDROCHLORIDE 50 MG: 50 TABLET ORAL at 19:52

## 2018-01-01 RX ADMIN — RANITIDINE 150 MG: 150 TABLET ORAL at 17:20

## 2018-01-01 RX ADMIN — SENNOSIDES 1 TABLET: 8.6 TABLET, FILM COATED ORAL at 09:33

## 2018-01-01 RX ADMIN — SUCRALFATE 1 G: 1 TABLET ORAL at 20:22

## 2018-01-01 RX ADMIN — Medication 2.5 MG: at 17:36

## 2018-01-01 RX ADMIN — RANITIDINE 150 MG: 150 TABLET ORAL at 21:17

## 2018-01-01 RX ADMIN — ASPIRIN 81 MG: 81 TABLET, COATED ORAL at 10:00

## 2018-01-01 RX ADMIN — ATORVASTATIN CALCIUM 10 MG: 10 TABLET, FILM COATED ORAL at 10:47

## 2018-01-01 RX ADMIN — MIRTAZAPINE 7.5 MG: 7.5 TABLET ORAL at 20:27

## 2018-01-01 RX ADMIN — SUCRALFATE 1 G: 1 TABLET ORAL at 08:02

## 2018-01-01 RX ADMIN — CARVEDILOL 3.12 MG: 3.12 TABLET, FILM COATED ORAL at 18:08

## 2018-01-01 RX ADMIN — SUCRALFATE 1 G: 1 TABLET ORAL at 13:50

## 2018-01-01 RX ADMIN — CARVEDILOL 3.12 MG: 3.12 TABLET, FILM COATED ORAL at 08:54

## 2018-01-01 RX ADMIN — SENNOSIDES 1 TABLET: 8.6 TABLET, FILM COATED ORAL at 20:36

## 2018-01-01 RX ADMIN — SODIUM CHLORIDE, PRESERVATIVE FREE 61 ML: 5 INJECTION INTRAVENOUS at 19:49

## 2018-01-01 RX ADMIN — ACETAMINOPHEN 1000 MG: 500 TABLET, FILM COATED ORAL at 18:30

## 2018-01-01 RX ADMIN — RANITIDINE 150 MG: 150 TABLET ORAL at 21:42

## 2018-01-01 RX ADMIN — RANITIDINE 150 MG: 150 TABLET ORAL at 20:35

## 2018-01-01 RX ADMIN — POLYETHYLENE GLYCOL 3350 17 G: 17 POWDER, FOR SOLUTION ORAL at 10:02

## 2018-01-01 RX ADMIN — CARVEDILOL 3.12 MG: 3.12 TABLET, FILM COATED ORAL at 20:18

## 2018-01-01 RX ADMIN — SUCRALFATE 1 G: 1 TABLET ORAL at 08:59

## 2018-01-01 RX ADMIN — RIVASTIGMINE TARTRATE 1.5 MG: 1.5 CAPSULE ORAL at 18:06

## 2018-01-01 RX ADMIN — TRAZODONE HYDROCHLORIDE 50 MG: 50 TABLET ORAL at 21:35

## 2018-01-01 RX ADMIN — ATORVASTATIN CALCIUM 10 MG: 10 TABLET, FILM COATED ORAL at 09:33

## 2018-01-01 RX ADMIN — MIRTAZAPINE 7.5 MG: 7.5 TABLET ORAL at 20:14

## 2018-01-01 RX ADMIN — RANITIDINE 150 MG: 150 TABLET ORAL at 16:42

## 2018-01-01 RX ADMIN — RIVASTIGMINE TARTRATE 1.5 MG: 1.5 CAPSULE ORAL at 18:08

## 2018-01-01 RX ADMIN — ALUMINUM HYDROXIDE, MAGNESIUM HYDROXIDE, AND DIMETHICONE 30 ML: 400; 400; 40 SUSPENSION ORAL at 16:58

## 2018-01-01 RX ADMIN — CEFTRIAXONE SODIUM 2 G: 2 INJECTION, POWDER, FOR SOLUTION INTRAMUSCULAR; INTRAVENOUS at 21:45

## 2018-01-01 RX ADMIN — CARVEDILOL 3.12 MG: 3.12 TABLET, FILM COATED ORAL at 18:23

## 2018-01-01 RX ADMIN — CARVEDILOL 3.12 MG: 3.12 TABLET, FILM COATED ORAL at 21:42

## 2018-01-01 RX ADMIN — ASPIRIN 81 MG: 81 TABLET, COATED ORAL at 08:59

## 2018-01-01 RX ADMIN — LISINOPRIL 2.5 MG: 2.5 TABLET ORAL at 16:41

## 2018-01-01 RX ADMIN — ACETAMINOPHEN 650 MG: 325 TABLET, FILM COATED ORAL at 17:20

## 2018-01-01 RX ADMIN — ACETAMINOPHEN 650 MG: 325 TABLET, FILM COATED ORAL at 10:58

## 2018-01-01 RX ADMIN — CEFTRIAXONE SODIUM 1 G: 1 INJECTION, POWDER, FOR SOLUTION INTRAMUSCULAR; INTRAVENOUS at 13:23

## 2018-01-01 RX ADMIN — CARVEDILOL 3.12 MG: 3.12 TABLET, FILM COATED ORAL at 09:33

## 2018-01-01 RX ADMIN — Medication 2.5 MG: at 20:26

## 2018-01-01 RX ADMIN — MIRTAZAPINE 7.5 MG: 7.5 TABLET ORAL at 20:36

## 2018-01-01 RX ADMIN — ACETAMINOPHEN 650 MG: 325 TABLET, FILM COATED ORAL at 21:40

## 2018-01-01 RX ADMIN — SUCRALFATE 1 G: 1 TABLET ORAL at 18:08

## 2018-01-01 RX ADMIN — Medication 2.5 MG: at 20:37

## 2018-01-01 RX ADMIN — HUMAN ALBUMIN MICROSPHERES AND PERFLUTREN 9 ML: 10; .22 INJECTION, SOLUTION INTRAVENOUS at 09:36

## 2018-01-01 RX ADMIN — RANITIDINE 150 MG: 150 TABLET ORAL at 08:54

## 2018-01-01 RX ADMIN — RANITIDINE 150 MG: 150 TABLET ORAL at 21:35

## 2018-01-01 RX ADMIN — FUROSEMIDE 20 MG: 10 INJECTION, SOLUTION INTRAVENOUS at 08:01

## 2018-01-01 RX ADMIN — POLYETHYLENE GLYCOL 3350 17 G: 17 POWDER, FOR SOLUTION ORAL at 17:05

## 2018-01-01 RX ADMIN — MIRTAZAPINE 7.5 MG: 7.5 TABLET ORAL at 20:18

## 2018-01-01 RX ADMIN — Medication 2.5 MG: at 10:12

## 2018-01-01 RX ADMIN — CARVEDILOL 3.12 MG: 3.12 TABLET, FILM COATED ORAL at 08:59

## 2018-01-01 RX ADMIN — PROCHLORPERAZINE MALEATE 5 MG: 5 TABLET, FILM COATED ORAL at 21:35

## 2018-01-01 RX ADMIN — ONDANSETRON 4 MG: 4 TABLET, ORALLY DISINTEGRATING ORAL at 20:25

## 2018-01-01 RX ADMIN — ASPIRIN 81 MG: 81 TABLET, COATED ORAL at 10:16

## 2018-01-01 RX ADMIN — TRAZODONE HYDROCHLORIDE 50 MG: 50 TABLET ORAL at 20:14

## 2018-01-01 RX ADMIN — ACETAMINOPHEN 650 MG: 325 TABLET, FILM COATED ORAL at 16:58

## 2018-01-01 RX ADMIN — SUCRALFATE 1 G: 1 TABLET ORAL at 18:06

## 2018-01-01 RX ADMIN — ALUMINUM HYDROXIDE, MAGNESIUM HYDROXIDE, AND DIMETHICONE 30 ML: 400; 400; 40 SUSPENSION ORAL at 17:13

## 2018-01-01 RX ADMIN — FUROSEMIDE 20 MG: 10 INJECTION, SOLUTION INTRAVENOUS at 08:12

## 2018-01-01 RX ADMIN — CARVEDILOL 3.12 MG: 3.12 TABLET, FILM COATED ORAL at 20:26

## 2018-01-01 RX ADMIN — Medication 2.5 MG: at 02:52

## 2018-01-01 RX ADMIN — RIVASTIGMINE TARTRATE 1.5 MG: 1.5 CAPSULE ORAL at 08:12

## 2018-01-01 RX ADMIN — RIVASTIGMINE TARTRATE 1.5 MG: 1.5 CAPSULE ORAL at 08:54

## 2018-01-01 RX ADMIN — CARVEDILOL 3.12 MG: 3.12 TABLET, FILM COATED ORAL at 08:12

## 2018-01-01 RX ADMIN — DOCUSATE SODIUM 100 MG: 100 CAPSULE, LIQUID FILLED ORAL at 19:51

## 2018-01-01 RX ADMIN — RIVASTIGMINE TARTRATE 1.5 MG: 1.5 CAPSULE ORAL at 08:43

## 2018-01-01 RX ADMIN — LISINOPRIL 2.5 MG: 2.5 TABLET ORAL at 09:57

## 2018-01-01 RX ADMIN — CARVEDILOL 3.12 MG: 3.12 TABLET, FILM COATED ORAL at 08:02

## 2018-01-01 RX ADMIN — ASPIRIN 81 MG: 81 TABLET, COATED ORAL at 09:57

## 2018-01-01 RX ADMIN — CEFTRIAXONE 1 G: 1 INJECTION, POWDER, FOR SOLUTION INTRAMUSCULAR; INTRAVENOUS at 22:08

## 2018-01-01 RX ADMIN — RANITIDINE 150 MG: 150 TABLET ORAL at 20:45

## 2018-01-01 RX ADMIN — RANITIDINE 150 MG: 150 TABLET ORAL at 08:58

## 2018-01-01 RX ADMIN — ALUMINUM HYDROXIDE, MAGNESIUM HYDROXIDE, AND DIMETHICONE 30 ML: 400; 400; 40 SUSPENSION ORAL at 10:38

## 2018-01-01 RX ADMIN — RIVASTIGMINE TARTRATE 1.5 MG: 1.5 CAPSULE ORAL at 18:20

## 2018-01-01 RX ADMIN — VITAMIN D, TAB 1000IU (100/BT) 2000 UNITS: 25 TAB at 10:14

## 2018-01-01 RX ADMIN — MIRTAZAPINE 7.5 MG: 7.5 TABLET ORAL at 20:45

## 2018-01-01 RX ADMIN — LISINOPRIL 2.5 MG: 2.5 TABLET ORAL at 10:15

## 2018-01-01 RX ADMIN — POTASSIUM CHLORIDE 20 MEQ: 20 SOLUTION ORAL at 17:04

## 2018-01-01 RX ADMIN — ACETAMINOPHEN 650 MG: 325 TABLET, FILM COATED ORAL at 01:39

## 2018-01-01 RX ADMIN — SUCRALFATE 1 G: 1 TABLET ORAL at 08:15

## 2018-01-01 RX ADMIN — SUCRALFATE 1 G: 1 TABLET ORAL at 20:14

## 2018-01-01 RX ADMIN — CARVEDILOL 3.12 MG: 3.12 TABLET, FILM COATED ORAL at 20:45

## 2018-01-01 RX ADMIN — ASPIRIN 81 MG: 81 TABLET, COATED ORAL at 08:01

## 2018-01-01 RX ADMIN — TRAZODONE HYDROCHLORIDE 50 MG: 50 TABLET ORAL at 20:36

## 2018-01-01 RX ADMIN — SUCRALFATE 1 G: 1 TABLET ORAL at 13:30

## 2018-01-01 RX ADMIN — Medication 2.5 MG: at 00:34

## 2018-01-01 RX ADMIN — DOCUSATE SODIUM 100 MG: 100 CAPSULE, LIQUID FILLED ORAL at 20:26

## 2018-01-01 RX ADMIN — RIVASTIGMINE TARTRATE 1.5 MG: 1.5 CAPSULE ORAL at 08:01

## 2018-01-01 RX ADMIN — ACETAMINOPHEN 1000 MG: 500 TABLET, FILM COATED ORAL at 09:40

## 2018-01-01 RX ADMIN — RANITIDINE 150 MG: 150 TABLET ORAL at 20:20

## 2018-01-01 RX ADMIN — SUCRALFATE 1 G: 1 TABLET ORAL at 20:27

## 2018-01-01 RX ADMIN — ACETAMINOPHEN 650 MG: 325 TABLET, FILM COATED ORAL at 10:42

## 2018-01-01 RX ADMIN — CARVEDILOL 3.12 MG: 3.12 TABLET, FILM COATED ORAL at 10:47

## 2018-01-01 RX ADMIN — CARVEDILOL 3.12 MG: 3.12 TABLET, FILM COATED ORAL at 09:57

## 2018-01-01 RX ADMIN — LISINOPRIL 2.5 MG: 2.5 TABLET ORAL at 10:47

## 2018-01-01 RX ADMIN — VITAMIN D, TAB 1000IU (100/BT) 2000 UNITS: 25 TAB at 10:47

## 2018-01-01 RX ADMIN — Medication 15 ML: at 17:08

## 2018-01-01 RX ADMIN — POLYETHYLENE GLYCOL 3350 17 G: 17 POWDER, FOR SOLUTION ORAL at 09:34

## 2018-01-01 RX ADMIN — DOCUSATE SODIUM 100 MG: 100 CAPSULE, LIQUID FILLED ORAL at 20:45

## 2018-01-01 RX ADMIN — Medication 15 ML: at 18:20

## 2018-01-01 RX ADMIN — ALUMINUM HYDROXIDE, MAGNESIUM HYDROXIDE, AND DIMETHICONE 30 ML: 400; 400; 40 SUSPENSION ORAL at 15:17

## 2018-01-01 RX ADMIN — FUROSEMIDE 20 MG: 10 INJECTION, SOLUTION INTRAVENOUS at 09:01

## 2018-01-01 RX ADMIN — RANITIDINE 150 MG: 150 TABLET ORAL at 09:33

## 2018-01-01 RX ADMIN — CARVEDILOL 3.12 MG: 3.12 TABLET, FILM COATED ORAL at 20:35

## 2018-01-01 RX ADMIN — VITAMIN D, TAB 1000IU (100/BT) 2000 UNITS: 25 TAB at 08:43

## 2018-01-01 RX ADMIN — ONDANSETRON 4 MG: 4 TABLET, ORALLY DISINTEGRATING ORAL at 20:20

## 2018-01-01 ASSESSMENT — ACTIVITIES OF DAILY LIVING (ADL)
ADLS_ACUITY_SCORE: 36
ADLS_ACUITY_SCORE: 24
ADLS_ACUITY_SCORE: 24
ADLS_ACUITY_SCORE: 25
ADLS_ACUITY_SCORE: 24
ADLS_ACUITY_SCORE: 27
ADLS_ACUITY_SCORE: 27
ADLS_ACUITY_SCORE: 28
ADLS_ACUITY_SCORE: 40
ADLS_ACUITY_SCORE: 24
ADLS_ACUITY_SCORE: 28
ADLS_ACUITY_SCORE: 26
ADLS_ACUITY_SCORE: 24
ADLS_ACUITY_SCORE: 26
ADLS_ACUITY_SCORE: 28
ADLS_ACUITY_SCORE: 24
ADLS_ACUITY_SCORE: 25
ADLS_ACUITY_SCORE: 24
ADLS_ACUITY_SCORE: 28
ADLS_ACUITY_SCORE: 36
ADLS_ACUITY_SCORE: 24
ADLS_ACUITY_SCORE: 26
ADLS_ACUITY_SCORE: 24
ADLS_ACUITY_SCORE: 26
ADLS_ACUITY_SCORE: 24
ADLS_ACUITY_SCORE: 33
ADLS_ACUITY_SCORE: 27
ADLS_ACUITY_SCORE: 28
ADLS_ACUITY_SCORE: 28
ADLS_ACUITY_SCORE: 24
ADLS_ACUITY_SCORE: 28
ADLS_ACUITY_SCORE: 27
ADLS_ACUITY_SCORE: 36
ADLS_ACUITY_SCORE: 28
ADLS_ACUITY_SCORE: 24
ADLS_ACUITY_SCORE: 27
ADLS_ACUITY_SCORE: 38
ADLS_ACUITY_SCORE: 38
ADLS_ACUITY_SCORE: 24
ADLS_ACUITY_SCORE: 27
ADLS_ACUITY_SCORE: 24
ADLS_ACUITY_SCORE: 32
ADLS_ACUITY_SCORE: 26
ADLS_ACUITY_SCORE: 28
ADLS_ACUITY_SCORE: 24
ADLS_ACUITY_SCORE: 28
ADLS_ACUITY_SCORE: 27
ADLS_ACUITY_SCORE: 38
ADLS_ACUITY_SCORE: 24
ADLS_ACUITY_SCORE: 24
ADLS_ACUITY_SCORE: 25
ADLS_ACUITY_SCORE: 27
ADLS_ACUITY_SCORE: 27
ADLS_ACUITY_SCORE: 36
ADLS_ACUITY_SCORE: 24
ADLS_ACUITY_SCORE: 27
ADLS_ACUITY_SCORE: 24

## 2018-01-01 ASSESSMENT — ENCOUNTER SYMPTOMS
BACK PAIN: 1
HEADACHES: 0

## 2018-01-01 ASSESSMENT — PAIN DESCRIPTION - DESCRIPTORS
DESCRIPTORS: SORE
DESCRIPTORS: BURNING
DESCRIPTORS: SORE

## 2018-07-25 NOTE — TELEPHONE ENCOUNTER
Call received from patient's daughter-in-law Geno. She stated that she would like to have Salo establish care with Dr. Penaloza. He recently moved from [place] to a nursing home near [place], [place]. He has a history of CHF and an ICD and previously followed with Dr. Alissa White, a Cardiologist with [facility] in [place], [place]. He was also recently admitted to [facility] for decompensated heart failure. Requested records from [facility], [facility], as well as his primary care provider with [facility] in preparation for his scheduled visit with Dr. Penaloza on 8/10/18. SHORTY Irizarry RN - 07/25/18, 11:52 AM

## 2018-08-06 NOTE — TELEPHONE ENCOUNTER
Called the [facility] Health Information Department to follow up on the request for the patient's previous Cardiology records. Confirmed our fax number and they stated they will process this request as soon as possible to be ready for the patient's visit with Dr. Penaloza on 8/10/18. SHORTY Irizarry RN - 08/06/18, 11:30 AM    ADDENDUM: Records from [facility] received and sent to be scanned. SHORTY Irizarry RN

## 2018-08-10 NOTE — MR AVS SNAPSHOT
After Visit Summary   8/10/2018    Salo Willis    MRN: [id number]           Patient Information     Date Of Birth          7/5/1930        Visit Information        Provider Department      8/10/2018 10:15 AM Huber Penaloza MD [facility]        Today's Diagnoses     S/P AAA repair    -  1    S/P CABG (coronary artery bypass graft)        Chronic atrial fibrillation (H)        Ischemic cardiomyopathy        ICD (implantable cardioverter-defibrillator) in place           Follow-ups after your visit        Additional Services     Follow-Up with Device Clinic           Follow-Up with [facility] - MARY visit                 Future tests that were ordered for you today     Open Future Orders        Priority Expected Expires Ordered    Follow-Up with Device Clinic Routine 8/10/2018 8/10/2019 8/10/2018    Follow-Up with [facility] - MARY visit Routine 8/24/2018 8/10/2019 8/10/2018    Basic metabolic panel Routine 8/17/2018 8/10/2019 8/10/2018            Who to contact     If you have questions or need follow up information about today's clinic visit or your schedule please contact [facility] directly at [phone].  Normal or non-critical lab and imaging results will be communicated to you by MyChart, letter or phone within 4 business days after the clinic has received the results. If you do not hear from us within 7 days, please contact the clinic through MyChart or phone. If you have a critical or abnormal lab result, we will notify you by phone as soon as possible.  Submit refill requests through [employer] or call your pharmacy and they will forward the refill request to us. Please allow 3 business days for your refill to be completed.          Additional Information About Your Visit        Care EveryWhere ID     This is your Care EveryWhere ID. This could be used by other organizations to access your [facility]  "records  [id number]        Your Vitals Were     Pulse Height Pulse Oximetry BMI (Body Mass Index)          70 1.727 m (5' 8\") 95% 23.98 kg/m2         Blood Pressure from Last 3 Encounters:   08/10/18 146/64    Weight from Last 3 Encounters:   08/10/18 71.5 kg (157 lb 11.2 oz)              We Performed the Following     EKG 12-lead complete w/read - Clinics (performed today)          Today's Medication Changes          These changes are accurate as of 8/10/18 11:26 AM.  If you have any questions, ask your nurse or doctor.               Start taking these medicines.        Dose/Directions    aspirin 81 MG EC tablet   Used for:  S/P CABG (coronary artery bypass graft)   Started by:  Huber Penaloza MD        Dose:  81 mg   Take 1 tablet (81 mg) by mouth daily   Quantity:  90 tablet   Refills:  3       clopidogrel 75 MG tablet   Commonly known as:  PLAVIX   Used for:  S/P CABG (coronary artery bypass graft), Chronic atrial fibrillation (H)   Started by:  Huber Penaloza MD        Dose:  75 mg   Take 1 tablet (75 mg) by mouth daily   Quantity:  90 tablet   Refills:  3       furosemide 20 MG tablet   Commonly known as:  LASIX   Used for:  Ischemic cardiomyopathy   Started by:  Huber Penaloza MD        Dose:  20 mg   Take 1 tablet (20 mg) by mouth daily   Quantity:  90 tablet   Refills:  3       lisinopril 2.5 MG tablet   Commonly known as:  PRINIVIL/Zestril   Used for:  S/P CABG (coronary artery bypass graft), Chronic atrial fibrillation (H)   Started by:  Huber Penaloza MD        Dose:  2.5 mg   Take 1 tablet (2.5 mg) by mouth daily   Quantity:  90 tablet   Refills:  3         Stop taking these medicines if you haven't already. Please contact your care team if you have questions.     ELIQUIS 2.5 MG tablet   Generic drug:  apixaban ANTICOAGULANT   Stopped by:  Huber Penaloza MD                Where to get your medicines      Some of these will need a paper prescription and others can be bought over the counter.  Ask " your nurse if you have questions.     Bring a paper prescription for each of these medications     aspirin 81 MG EC tablet    clopidogrel 75 MG tablet    furosemide 20 MG tablet    lisinopril 2.5 MG tablet                Primary Care Provider Fax #    [facility] [phone]       [address] [place], [place] [address]        Equal Access to Services     KENRICK JIM : Hadii domi ku hadasho Soomaali, waaxda luqadaha, qaybta kaalmada ademehreen, liang harrell. So [facility] [phone].    ATENCIÓN: Si habla español, tiene a pham disposición servicios gratuitos de asistencia lingüística. [facility] [phone].    We comply with applicable federal civil rights laws and [place] laws. We do not discriminate on the basis of race, color, national origin, age, disability, sex, sexual orientation, or gender identity.            Thank you!     Thank you for choosing [facility]  for your care. Our goal is always to provide you with excellent care. Hearing back from our patients is one way we can continue to improve our services. Please take a few minutes to complete the written survey that you may receive in the mail after your visit with us. Thank you!             Your Updated Medication List - Protect others around you: Learn how to safely use, store and throw away your medicines at www.disposemymeds.org.          This list is accurate as of 8/10/18 11:26 AM.  Always use your most recent med list.                   Brand Name Dispense Instructions for use Diagnosis    aspirin 81 MG EC tablet     90 tablet    Take 1 tablet (81 mg) by mouth daily    S/P CABG (coronary artery bypass graft)       atorvastatin 10 MG tablet    LIPITOR     Take 10 mg by mouth daily        carvedilol 3.125 MG tablet    COREG     Take 3.125 mg by mouth 2 times daily (with meals)        cholecalciferol 1000 units capsule    vitamin  -D     Take 2 capsules by  mouth daily        clopidogrel 75 MG tablet    PLAVIX    90 tablet    Take 1 tablet (75 mg) by mouth daily    S/P CABG (coronary artery bypass graft), Chronic atrial fibrillation (H)       furosemide 20 MG tablet    LASIX    90 tablet    Take 1 tablet (20 mg) by mouth daily    Ischemic cardiomyopathy       lisinopril 2.5 MG tablet    PRINIVIL/Zestril    90 tablet    Take 1 tablet (2.5 mg) by mouth daily    S/P CABG (coronary artery bypass graft), Chronic atrial fibrillation (H)       MAPAP 500 MG Caps   Generic drug:  acetaminophen      1000 mg three times daily        mirtazapine 7.5 mg Tabs    REMERON     Take 15 mg by mouth At Bedtime        potassium chloride SA 10 MEQ CR tablet    K-DUR/KLOR-CON M     Take 10 mEq by mouth daily        ranitidine 150 MG tablet    ZANTAC     Take 150 mg by mouth daily        rivastigmine 1.5 MG capsule    EXELON     Take 1.5 mg by mouth 2 times daily        STOOL SOFTENER 100 MG tablet   Generic drug:  docusate sodium      Take 100 mg by mouth daily        traZODone 50 MG tablet    DESYREL     Take 50 mg by mouth At Bedtime

## 2018-08-10 NOTE — PROGRESS NOTES
HPI and Plan:   See dictation(#[id number])    Orders Placed This Encounter   Procedures     Basic metabolic panel     Follow-Up with [facility] - MARY visit     Follow-Up with Device Clinic     EKG 12-lead complete w/read - Clinics (performed today)       Orders Placed This Encounter   Medications     atorvastatin (LIPITOR) 10 MG tablet     Sig: Take 10 mg by mouth daily     carvedilol (COREG) 3.125 MG tablet     Sig: Take 3.125 mg by mouth 2 times daily (with meals)     DISCONTD: apixaban ANTICOAGULANT (ELIQUIS) 2.5 MG tablet     Sig: Take 2.5 mg by mouth 2 times daily     acetaminophen (MAPAP) 500 MG CAPS     Si mg three times daily     mirtazapine (REMERON) 7.5 mg TABS     Sig: Take 15 mg by mouth At Bedtime     potassium chloride SA (K-DUR/KLOR-CON M) 10 MEQ CR tablet     Sig: Take 10 mEq by mouth daily     ranitidine (ZANTAC) 150 MG tablet     Sig: Take 150 mg by mouth daily     rivastigmine (EXELON) 1.5 MG capsule     Sig: Take 1.5 mg by mouth 2 times daily     docusate sodium (STOOL SOFTENER) 100 MG tablet     Sig: Take 100 mg by mouth daily     traZODone (DESYREL) 50 MG tablet     Sig: Take 50 mg by mouth At Bedtime     cholecalciferol (VITAMIN  -D) 1000 units capsule     Sig: Take 2 capsules by mouth daily     aspirin 81 MG EC tablet     Sig: Take 1 tablet (81 mg) by mouth daily     Dispense:  90 tablet     Refill:  3     clopidogrel (PLAVIX) 75 MG tablet     Sig: Take 1 tablet (75 mg) by mouth daily     Dispense:  90 tablet     Refill:  3     lisinopril (PRINIVIL/ZESTRIL) 2.5 MG tablet     Sig: Take 1 tablet (2.5 mg) by mouth daily     Dispense:  90 tablet     Refill:  3     furosemide (LASIX) 20 MG tablet     Sig: Take 1 tablet (20 mg) by mouth daily     Dispense:  90 tablet     Refill:  3       Medications Discontinued During This Encounter   Medication Reason     apixaban ANTICOAGULANT (ELIQUIS) 2.5 MG tablet          Encounter Diagnoses   Name Primary?     S/P AAA repair Yes     S/P CABG (coronary  artery bypass graft)      Chronic atrial fibrillation (H)      Ischemic cardiomyopathy      ICD (implantable cardioverter-defibrillator) in place        CURRENT MEDICATIONS:  Current Outpatient Prescriptions   Medication Sig Dispense Refill     acetaminophen (MAPAP) 500 MG CAPS 1000 mg three times daily       aspirin 81 MG EC tablet Take 1 tablet (81 mg) by mouth daily 90 tablet 3     atorvastatin (LIPITOR) 10 MG tablet Take 10 mg by mouth daily       carvedilol (COREG) 3.125 MG tablet Take 3.125 mg by mouth 2 times daily (with meals)       cholecalciferol (VITAMIN  -D) 1000 units capsule Take 2 capsules by mouth daily       clopidogrel (PLAVIX) 75 MG tablet Take 1 tablet (75 mg) by mouth daily 90 tablet 3     docusate sodium (STOOL SOFTENER) 100 MG tablet Take 100 mg by mouth daily       furosemide (LASIX) 20 MG tablet Take 1 tablet (20 mg) by mouth daily 90 tablet 3     lisinopril (PRINIVIL/ZESTRIL) 2.5 MG tablet Take 1 tablet (2.5 mg) by mouth daily 90 tablet 3     mirtazapine (REMERON) 7.5 mg TABS Take 15 mg by mouth At Bedtime       potassium chloride SA (K-DUR/KLOR-CON M) 10 MEQ CR tablet Take 10 mEq by mouth daily       ranitidine (ZANTAC) 150 MG tablet Take 150 mg by mouth daily       rivastigmine (EXELON) 1.5 MG capsule Take 1.5 mg by mouth 2 times daily       traZODone (DESYREL) 50 MG tablet Take 50 mg by mouth At Bedtime         ALLERGIES     Allergies   Allergen Reactions     Aspirin      Niacin        PAST MEDICAL HISTORY:  Past Medical History:   Diagnosis Date     Abdominal aortic aneurysm (H)      AICD (automatic cardioverter/defibrillator) present      Atrial fibrillation (H)      CAD (coronary artery disease)      Cardiomyopathy (H)      Carotid artery disease (H)      CHF (congestive heart failure) (H)      GERD (gastroesophageal reflux disease)      Hyperlipidemia      Inguinal hernia      Paroxysmal VT (H)      Prostate cancer (H)      S/P AAA repair      S/P CABG (coronary artery bypass graft)   "    S/P TURP        PAST SURGICAL HISTORY:  Past Surgical History:   Procedure Laterality Date     AICD, DUAL CHAMBER  08/29/2008    [facility] DR ACUÑA [id number]. Implanted by Dr. Marks with [facility] in [place].     CAROTID ENDARTERECTOMY Right      CORONARY ARTERY BYPASS      LIMA to LAD, SVG to OM, SVG to distal RCA, and SVG to Diagonal     HEART CATH LEFT HEART CATH  10/17/2014    LM, RCA, LAD, and left CFX occlusions: patent LIMA graft to LAD, patent SVGs to OM and distal RCA. Vein graft to diagonal previously found to be occluded. Mild aortic stenosis with 20 mmHg gradient across the aortic valve. Continue medical therapy.        FAMILY HISTORY:  Family History   Problem Relation Age of Onset     Unknown/Adopted Mother      Unknown/Adopted Father        SOCIAL HISTORY:  Social History     Social History     Marital status:      Spouse name: N/A     Number of children: N/A     Years of education: N/A     Social History Main Topics     Smoking status: Former Smoker     Types: Cigarettes     Smokeless tobacco: Former User      Comment: smoked as a \"young man\"      Alcohol use None     Drug use: None     Sexual activity: Not Asked     Other Topics Concern     None     Social History Narrative       Review of Systems:  Skin:  Positive for bruising     Eyes:  Positive for glasses    ENT:  Positive for hearing loss wears hearing aids  Respiratory:  Positive for cough     Cardiovascular:  Negative;palpitations;chest pain;lightheadedness;dizziness;syncope or near-syncope;cyanosis fatigue;exercise intolerance;Positive for;edema    Gastroenterology: Positive for      Genitourinary:  Positive for urinary frequency    Musculoskeletal:  Positive for muscular weakness    Neurologic:  Positive for stroke;memory problems    Psychiatric:  Negative      Heme/Lymph/Imm:  Positive for easy bruising    Endocrine:  Negative        Physical Exam:  Vitals: /64 (BP Location: Right arm, Cuff Size: Adult " "Regular)  Pulse 70  Ht 1.727 m (5' 8\")  Wt 71.5 kg (157 lb 11.2 oz)  SpO2 95%  BMI 23.98 kg/m2    Constitutional:           Skin:             Head:           Eyes:           Lymph:      ENT:           Neck:           Respiratory:            Cardiac:                                                           GI:           Extremities and Muscular Skeletal:                 Neurological:           Psych:             CC  [facility]  [address] [place] [id number]                  "

## 2018-08-10 NOTE — TELEPHONE ENCOUNTER
Josie from [facility] wanted to clarify patient has an ASA allergy.  Per Dr. Penaloza note patient has been on ASA and Plavix in [place].  Apparently had a history of bleeding at one time.  Dr. Penaloza ok'd to have allergy for Aspirin removed from allergy list signed letter faxed to [phone].

## 2018-08-10 NOTE — LETTER
[facility]  [address]  [place] [address]  Phone: [phone]  Fax: [phone]       August 10, 2018      Salo Willis      To Whom It May Concern:      The above patient has been on Aspirin in [place] as recent as March 2018.      Ok to remove Aspirin from the allergy list.      Huber Penaloza MD [facility]    SV/KT

## 2018-08-10 NOTE — LETTER
8/10/2018      [facility]  [address], [facility] [address]  [facility] [address]      RE: Salo Willis       Dear Colleague,    I had the pleasure of seeing Salo Willis in the [facility].    Service Date: 08/10/2018      REASON FOR CARDIOLOGY VISIT:  Establish cardiology care.      HISTORY OF PRESENT ILLNESS:  Mr. Willis is a very pleasant but frail 88-year-old gentleman with complex history.  He is establishing cardiology care with us.  He is accompanied by his niece, Arabella.        In terms of cardiac history, the patient has history of CABG with LIMA to LAD, SVG to OM, SVG to distal RCA and SVG to diagonal.  He had a coronary angiogram in 2014 that showed patent LIMA to LAD, patent SVG to OM patent SVG to distal RCA, SVG to diagonal was occluded.  He also has ischemic cardiomyopathy with echocardiogram done in 02/2018 showing LVEF of 45%-50% with apical, septal, basal inferolateral segment and apical lateral segmental wall motion abnormality, a 3.9 cm aortic root.  The LV was moderately thickened, 2+ mitral regurgitation, 2 to 3+ aortic valve regurgitation, normal RV systolic function, severely dilated left atrium and moderate aortic valve stenosis.        The patient also has history of chronic atrial fibrillation and history of recurrent falls and was not found to be a candidate for oral anticoagulation because of that.  In fact, he was on aspirin, Plavix through his cardiologist, Dr. White at [facility].  He has history of abdominal aortic aneurysm repair in 2005, history of right carotid artery endarterectomy.  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        The patient has now moved from [place] to [place].  As noted above, he is establishing cardiology care with us.  Remarkably, the patient has significant  history of recurrent falls.  He had 5 major falls in the last few months.  In 1 fall he hit his head and had a big laceration.  The other fall led to a rotator cuff tear again had trauma and he was admitted at [facility].  He was found to have hypotension and at that time many of his cardiomyopathy medication doses were change.  Lasix was completely stopped.  Carvedilol dose was decreased.  Lisinopril dose was decreased.        Remarkably, it looks like patient is now on 2.5 mg b.i.d. of apixaban.  Neither the patient nor his niece know when this change was done.  To be noted, aspirin is listed as allergy due to bleeding, but he was on aspirin as well as Plavix for what looks like a long time back in [place] as recently as March this year, according to last progress note available.      Since stopping his Lasix, the patient has noticed increased leg swelling and some shortness of breath with exertion.  He denies any chest discomfort, otherwise.  The patient does acknowledge adding over-the-counter salt to his food.  He is presently on 3.125 mg b.i.d. of carvedilol, 10 mg daily of atorvastatin and as noted above, he is also on 2.5 mg b.i.d. of apixaban.  The BMP I have available from 07/27/2018 shows potassium of 3.4, creatinine of 0.67, sodium of 143, bicarb of 28.      PHYSICAL EXAMINATION:   VITAL SIGNS:  Blood pressure 146/64, heart rate 70, weight 157 pounds, BMI 24.03.   GENERAL:  The patient appears pleasant, comfortable, frail.   NECK:  JVP appears normal without any hepatojugular reflexes.   CARDIOVASCULAR SYSTEM:  Somewhat irregular.  There is grade 2/6 systolic murmur heard best over the left lower sternal border, no S3, S4, rub or gallop.  There is grade 2/6 ejection systolic murmur heard over the right upper sternal border, no S3, S4.   RESPIRATORY:  A very few crackles at the bases.   ABDOMEN:  Soft, nontender.   EXTREMITIES:  There is about 1+ pitting pedal edema bilaterally.    PSYCHIATRIC:  Normal affect.   SKIN:  No obvious rash.   HEENT:  No significant pallor, no icterus.      EKG done today was personally reviewed by me shows what looks like probably atrial fibrillation with right bundle branch block.      IMPRESSION AND PLAN:  A pleasant but frail 88-year-old gentleman with a complex cardiac history with history of CAD with history of CABG, ischemic cardiomyopathy with LVEF around 45%-50%, chronic atrial fibrillation, was not on oral medication for a long time because of fall risk, recurrent falls mechanical in nature with significant trauma as noted above, carotid endarterectomy history, history of abdominal aortic aneurysm repair, ICD implantation and dyslipidemia.     Overall, cardiac status-wise, the patient does appear to have some volume overload with increased pedal edema and a few crackles at the bases and some dyspnea on exertion.  The patient and his niece both very wary of higher doses of diuretics, especially given that he was hypotensive and he was recently admitted to [facility].  I, however, do recommend use of diuretics as there is clearcut evidence of volume overload.  I recommend starting at low dose 20 mg daily of Lasix.  I also recommend adding low-dose lisinopril given underlying ischemic cardiomyopathy and blood pressure is elevated today.  We can start 2.5 mg daily of lisinopril.     We had a long discussion about the option of apixaban, which he is on.  We are not clear when this was started but this was not on when patient was last seen by his longstanding cardiologist back in [place] about 5 months ago.  In fact, there is significant concern about his recurrent falls due to strong oral anticoagulants.  In fact the recommended dose with his kidney functions and his weight and age is 5 mg twice a day.  After a long discussion, weighing pros and cons, the patient wishes to go back on combination of aspirin, Plavix which he was on for a  long time and was tolerated quite well.  Although aspirin is listed as an allergy, but he was clearly on aspirin for a long time.  We will resume baby aspirin and 75 mg daily of Plavix and discontinue apixaban at this time, for reasons noted above.  I also recommend establishing care in our Device Clinic for regular device followup.     I recommend close followup in about 2 weeks' time to see how he is doing in terms of pedal edema and shortness of breath.  I recommend checking a BMP in 1 week's time because of the changes we made today.  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   1.  CAD, status post 4-vessel CABG with coronary angiogram in 2014 showing patent LIMA to LAD, patent SVG to OM, patent SVG to distal RCA, SVG to diagonal was occluded.  Clinically, no anginal symptoms.   2.  Ischemic cardiomyopathy with left ventricular ejection fraction of 45%-50%.  Does appear to have volume overload, especially some extravascular volume overload with pedal edema and a few crackles, and dyspnea on exertion.  This happened in the setting of discontinuing diuretics as noted above.  Also, some element of salt indiscretion.  Recommended the patient to keep salt intake, less than 2 grams and not to add over-the-counter salt.   3.  Chronic atrial fibrillation,  CHADS2-VASc score is quite elevated at least 6.  However, significant fall risk, recurrent falls and was not on oral anticoagulation for that reason through his long-term cardiologist back in [place].  I agree with this as recently last month, the patient had significant falls leading to head trauma as well as rotator cuff tear.  We will discontinue apixaban for this reason and add aspirin and Plavix, which  offers some protection but not as much as apixaban.  Risk/benefit of each of these studies were discussed with the patient and his niece and they want to discontinue apixaban and continue aspirin and Plavix.   4.  Status post ICD.  This happened in the setting of history of paroxysmal ventral tachycardia and inducible VT on EP study back in .  I do not see any documentation of patient receiving any shocks or ATP therapies.  He did have some episode of NSVT detected on device interrogation on reports available.  He will establish care in Device Clinic.   5.  Recurrent falls.   6.  Peripheral vascular disease with evidence of iliac artery aneurysm.  Eventually, the patient may benefit from following in our Vascular Clinic.  This can be discussed in followup visit.   7.  History of carotid endarterectomy.   8.  History of AAA aneurysm repair.   9.  Dyslipidemia.   10.  Frail status.      RECOMMENDATIONS:   1.  Discontinue apixaban.   2.  Start aspirin 81 mg daily.   3.  Start Plavix 75 mg daily.   4.  Start Lasix 20 mg daily.   5.  Check BMP in 1 week.   6.  Follow up in [facility] in 2 weeks.   7.  Continue carvedilol and Lipitor.   8.  Enroll in Device Clinic.   9.  Strongly recommend keeping salt intake, less than 2 grams per day.      Eight minutes of total time was spent with more than 50% counseling and coordination of care.         UMA BARAHONA MD             D: 08/10/2018   T: 08/10/2018   MT: PHILLIP      Name:     CARI HICKMAN   MRN:      [id number]        Account:      [id number]   :      1930           Service Date: 08/10/2018      Document: [id number]         Outpatient Encounter Prescriptions as of 8/10/2018   Medication Sig Dispense Refill     acetaminophen (MAPAP) 500 MG CAPS 1000 mg three times daily       aspirin 81 MG EC tablet Take 1 tablet (81 mg) by mouth daily 90 tablet 3     atorvastatin (LIPITOR) 10 MG tablet Take 10 mg by mouth daily       carvedilol (COREG) 3.125 MG tablet  Take 3.125 mg by mouth 2 times daily (with meals)       cholecalciferol (VITAMIN  -D) 1000 units capsule Take 2 capsules by mouth daily       clopidogrel (PLAVIX) 75 MG tablet Take 1 tablet (75 mg) by mouth daily 90 tablet 3     docusate sodium (STOOL SOFTENER) 100 MG tablet Take 100 mg by mouth daily       furosemide (LASIX) 20 MG tablet Take 1 tablet (20 mg) by mouth daily 90 tablet 3     lisinopril (PRINIVIL/ZESTRIL) 2.5 MG tablet Take 1 tablet (2.5 mg) by mouth daily 90 tablet 3     mirtazapine (REMERON) 7.5 mg TABS Take 15 mg by mouth At Bedtime       potassium chloride SA (K-DUR/KLOR-CON M) 10 MEQ CR tablet Take 10 mEq by mouth daily       ranitidine (ZANTAC) 150 MG tablet Take 150 mg by mouth daily       rivastigmine (EXELON) 1.5 MG capsule Take 1.5 mg by mouth 2 times daily       traZODone (DESYREL) 50 MG tablet Take 50 mg by mouth At Bedtime       [DISCONTINUED] apixaban ANTICOAGULANT (ELIQUIS) 2.5 MG tablet Take 2.5 mg by mouth 2 times daily       No facility-administered encounter medications on file as of 8/10/2018.        Again, thank you for allowing me to participate in the care of your patient.      Sincerely,    Huber Penaloza MD     [facility]

## 2018-08-10 NOTE — LETTER
8/10/2018    [facility]  [address] [place] [address], [facility] [address]  [facility] [address]    RE: Salo Willis       Dear Colleague,    I had the pleasure of seeing Salo Willis in the [facility].    HPI and Plan:   See dictation(#[id number])    Orders Placed This Encounter   Procedures     Basic metabolic panel     Follow-Up with [facility] - MARY visit     Follow-Up with Device Clinic     EKG 12-lead complete w/read - Clinics (performed today)       Orders Placed This Encounter   Medications     atorvastatin (LIPITOR) 10 MG tablet     Sig: Take 10 mg by mouth daily     carvedilol (COREG) 3.125 MG tablet     Sig: Take 3.125 mg by mouth 2 times daily (with meals)     DISCONTD: apixaban ANTICOAGULANT (ELIQUIS) 2.5 MG tablet     Sig: Take 2.5 mg by mouth 2 times daily     acetaminophen (MAPAP) 500 MG CAPS     Si mg three times daily     mirtazapine (REMERON) 7.5 mg TABS     Sig: Take 15 mg by mouth At Bedtime     potassium chloride SA (K-DUR/KLOR-CON M) 10 MEQ CR tablet     Sig: Take 10 mEq by mouth daily     ranitidine (ZANTAC) 150 MG tablet     Sig: Take 150 mg by mouth daily     rivastigmine (EXELON) 1.5 MG capsule     Sig: Take 1.5 mg by mouth 2 times daily     docusate sodium (STOOL SOFTENER) 100 MG tablet     Sig: Take 100 mg by mouth daily     traZODone (DESYREL) 50 MG tablet     Sig: Take 50 mg by mouth At Bedtime     cholecalciferol (VITAMIN  -D) 1000 units capsule     Sig: Take 2 capsules by mouth daily     aspirin 81 MG EC tablet     Sig: Take 1 tablet (81 mg) by mouth daily     Dispense:  90 tablet     Refill:  3     clopidogrel (PLAVIX) 75 MG tablet     Sig: Take 1 tablet (75 mg) by mouth daily     Dispense:  90 tablet     Refill:  3     lisinopril (PRINIVIL/ZESTRIL) 2.5 MG tablet     Sig: Take 1 tablet (2.5 mg) by mouth daily     Dispense:  90 tablet     Refill:  3     furosemide (LASIX) 20 MG tablet     Sig: Take 1 tablet (20 mg) by mouth daily      Dispense:  90 tablet     Refill:  3       Medications Discontinued During This Encounter   Medication Reason     apixaban ANTICOAGULANT (ELIQUIS) 2.5 MG tablet          Encounter Diagnoses   Name Primary?     S/P AAA repair Yes     S/P CABG (coronary artery bypass graft)      Chronic atrial fibrillation (H)      Ischemic cardiomyopathy      ICD (implantable cardioverter-defibrillator) in place        CURRENT MEDICATIONS:  Current Outpatient Prescriptions   Medication Sig Dispense Refill     acetaminophen (MAPAP) 500 MG CAPS 1000 mg three times daily       aspirin 81 MG EC tablet Take 1 tablet (81 mg) by mouth daily 90 tablet 3     atorvastatin (LIPITOR) 10 MG tablet Take 10 mg by mouth daily       carvedilol (COREG) 3.125 MG tablet Take 3.125 mg by mouth 2 times daily (with meals)       cholecalciferol (VITAMIN  -D) 1000 units capsule Take 2 capsules by mouth daily       clopidogrel (PLAVIX) 75 MG tablet Take 1 tablet (75 mg) by mouth daily 90 tablet 3     docusate sodium (STOOL SOFTENER) 100 MG tablet Take 100 mg by mouth daily       furosemide (LASIX) 20 MG tablet Take 1 tablet (20 mg) by mouth daily 90 tablet 3     lisinopril (PRINIVIL/ZESTRIL) 2.5 MG tablet Take 1 tablet (2.5 mg) by mouth daily 90 tablet 3     mirtazapine (REMERON) 7.5 mg TABS Take 15 mg by mouth At Bedtime       potassium chloride SA (K-DUR/KLOR-CON M) 10 MEQ CR tablet Take 10 mEq by mouth daily       ranitidine (ZANTAC) 150 MG tablet Take 150 mg by mouth daily       rivastigmine (EXELON) 1.5 MG capsule Take 1.5 mg by mouth 2 times daily       traZODone (DESYREL) 50 MG tablet Take 50 mg by mouth At Bedtime         ALLERGIES     Allergies   Allergen Reactions     Aspirin      Niacin        PAST MEDICAL HISTORY:  Past Medical History:   Diagnosis Date     Abdominal aortic aneurysm (H)      AICD (automatic cardioverter/defibrillator) present      Atrial fibrillation (H)      CAD (coronary artery disease)      Cardiomyopathy (H)      Carotid artery  "disease (H)      CHF (congestive heart failure) (H)      GERD (gastroesophageal reflux disease)      Hyperlipidemia      Inguinal hernia      Paroxysmal VT (H)      Prostate cancer (H)      S/P AAA repair      S/P CABG (coronary artery bypass graft)      S/P TURP        PAST SURGICAL HISTORY:  Past Surgical History:   Procedure Laterality Date     AICD, DUAL CHAMBER  08/29/2008    [facility] [employer] DR ACUÑA [id number]. Implanted by Dr. Marks with [facility] in [place].     CAROTID ENDARTERECTOMY Right      CORONARY ARTERY BYPASS      LIMA to LAD, SVG to OM, SVG to distal RCA, and SVG to Diagonal     HEART CATH LEFT HEART CATH  10/17/2014    LM, RCA, LAD, and left CFX occlusions: patent LIMA graft to LAD, patent SVGs to OM and distal RCA. Vein graft to diagonal previously found to be occluded. Mild aortic stenosis with 20 mmHg gradient across the aortic valve. Continue medical therapy.        FAMILY HISTORY:  Family History   Problem Relation Age of Onset     Unknown/Adopted Mother      Unknown/Adopted Father        SOCIAL HISTORY:  Social History     Social History     Marital status:      Spouse name: N/A     Number of children: N/A     Years of education: N/A     Social History Main Topics     Smoking status: Former Smoker     Types: Cigarettes     Smokeless tobacco: Former User      Comment: smoked as a \"young man\"      Alcohol use None     Drug use: None     Sexual activity: Not Asked     Other Topics Concern     None     Social History Narrative       Review of Systems:  Skin:  Positive for bruising     Eyes:  Positive for glasses    ENT:  Positive for hearing loss wears hearing aids  Respiratory:  Positive for cough     Cardiovascular:  Negative;palpitations;chest pain;lightheadedness;dizziness;syncope or near-syncope;cyanosis fatigue;exercise intolerance;Positive for;edema    Gastroenterology: Positive for      Genitourinary:  Positive for urinary frequency    Musculoskeletal:  Positive for " "muscular weakness    Neurologic:  Positive for stroke;memory problems    Psychiatric:  Negative      Heme/Lymph/Imm:  Positive for easy bruising    Endocrine:  Negative        Physical Exam:  Vitals: /64 (BP Location: Right arm, Cuff Size: Adult Regular)  Pulse 70  Ht 1.727 m (5' 8\")  Wt 71.5 kg (157 lb 11.2 oz)  SpO2 95%  BMI 23.98 kg/m2    Constitutional:           Skin:             Head:           Eyes:           Lymph:      ENT:           Neck:           Respiratory:            Cardiac:                                                           GI:           Extremities and Muscular Skeletal:                 Neurological:           Psych:             CC  [facility]  [address] [place] [address]                    Thank you for allowing me to participate in the care of your patient.      Sincerely,     Huber Penaloza MD     [facility]    cc:   [facility]  [address] [place] [address]        "

## 2018-08-10 NOTE — LETTER
8/10/2018      [facility]  [address], [address]  [place] [address]      RE: Salo Willis       Dear Colleague,    I had the pleasure of seeing Salo Willis in the [facility].    Service Date: 08/10/2018      REASON FOR CARDIOLOGY VISIT:  Establish cardiology care.      HISTORY OF PRESENT ILLNESS:  Mr. Willis is a very pleasant but frail 88-year-old gentleman with complex history.  He is establishing cardiology care with us.  He is accompanied by his niece, Arabella.        In terms of cardiac history, the patient has history of CABG with LIMA to LAD, SVG to OM, SVG to distal RCA and SVG to diagonal.  He had a coronary angiogram in 2014 that showed patent LIMA to LAD, patent SVG to OM patent SVG to distal RCA, SVG to diagonal was occluded.  He also has ischemic cardiomyopathy with echocardiogram done in 02/2018 showing LVEF of 45%-50% with apical, septal, basal inferolateral segment and apical lateral segmental wall motion abnormality, a 3.9 cm aortic root.  The LV was moderately thickened, 2+ mitral regurgitation, 2 to 3+ aortic valve regurgitation, normal RV systolic function, severely dilated left atrium and moderate aortic valve stenosis.        The patient also has history of chronic atrial fibrillation and history of recurrent falls and was not found to be a candidate for oral anticoagulation because of that.  In fact, he was on aspirin, Plavix through his cardiologist, Dr. White at [facility].  He has history of abdominal aortic aneurysm repair in 2005, history of right carotid artery endarterectomy.  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        The patient has now moved from [place] to [place].  As noted above, he is establishing cardiology care with us.  Remarkably, the patient has significant  history of recurrent falls.  He had 5 major falls in the last few months.  In 1 fall he hit his head and had a big laceration.  The other fall led to a rotator cuff tear again had trauma and he was admitted at [facility].  He was found to have hypotension and at that time many of his cardiomyopathy medication doses were change.  Lasix was completely stopped.  Carvedilol dose was decreased.  Lisinopril dose was decreased.        Remarkably, it looks like patient is now on 2.5 mg b.i.d. of apixaban.  Neither the patient nor his niece know when this change was done.  To be noted, aspirin is listed as allergy due to bleeding, but he was on aspirin as well as Plavix for what looks like a long time back in [place] as recently as March this year, according to last progress note available.      Since stopping his Lasix, the patient has noticed increased leg swelling and some shortness of breath with exertion.  He denies any chest discomfort, otherwise.  The patient does acknowledge adding over-the-counter salt to his food.  He is presently on 3.125 mg b.i.d. of carvedilol, 10 mg daily of atorvastatin and as noted above, he is also on 2.5 mg b.i.d. of apixaban.  The BMP I have available from 07/27/2018 shows potassium of 3.4, creatinine of 0.67, sodium of 143, bicarb of 28.      PHYSICAL EXAMINATION:   VITAL SIGNS:  Blood pressure 146/64, heart rate 70, weight 157 pounds, BMI 24.03.   GENERAL:  The patient appears pleasant, comfortable, frail.   NECK:  JVP appears normal without any hepatojugular reflexes.   CARDIOVASCULAR SYSTEM:  Somewhat irregular.  There is grade 2/6 systolic murmur heard best over the left lower sternal border, no S3, S4, rub or gallop.  There is grade 2/6 ejection systolic murmur heard over the right upper sternal border, no S3, S4.   RESPIRATORY:  A very few crackles at the bases.   ABDOMEN:  Soft, nontender.   EXTREMITIES:  There is about 1+ pitting pedal edema bilaterally.    PSYCHIATRIC:  Normal affect.   SKIN:  No obvious rash.   HEENT:  No significant pallor, no icterus.      EKG done today was personally reviewed by me shows what looks like probably atrial fibrillation with right bundle branch block.      IMPRESSION AND PLAN:  A pleasant but frail 88-year-old gentleman with a complex cardiac history with history of CAD with history of CABG, ischemic cardiomyopathy with LVEF around 45%-50%, chronic atrial fibrillation, was not on oral medication for a long time because of fall risk, recurrent falls mechanical in nature with significant trauma as noted above, carotid endarterectomy history, history of abdominal aortic aneurysm repair, ICD implantation and dyslipidemia.     Overall, cardiac status-wise, the patient does appear to have some volume overload with increased pedal edema and a few crackles at the bases and some dyspnea on exertion.  The patient and his niece both very wary of higher doses of diuretics, especially given that he was hypotensive and he was recently admitted to [facility].  I, however, do recommend use of diuretics as there is clearcut evidence of volume overload.  I recommend starting at low dose 20 mg daily of Lasix.  I also recommend adding low-dose lisinopril given underlying ischemic cardiomyopathy and blood pressure is elevated today.  We can start 2.5 mg daily of lisinopril.     We had a long discussion about the option of apixaban, which he is on.  We are not clear when this was started but this was not on when patient was last seen by his longstanding cardiologist back in [place] about 5 months ago.  In fact, there is significant concern about his recurrent falls due to strong oral anticoagulants.  In fact the recommended dose with his kidney functions and his weight and age is 5 mg twice a day.  After a long discussion, weighing pros and cons, the patient wishes to go back on combination of aspirin, Plavix which he was on for a  long time and was tolerated quite well.  Although aspirin is listed as an allergy, but he was clearly on aspirin for a long time.  We will resume baby aspirin and 75 mg daily of Plavix and discontinue apixaban at this time, for reasons noted above.  I also recommend establishing care in our Device Clinic for regular device followup.     I recommend close followup in about 2 weeks' time to see how he is doing in terms of pedal edema and shortness of breath.  I recommend checking a BMP in 1 week's time because of the changes we made today.  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   1.  CAD, status post 4-vessel CABG with coronary angiogram in 2014 showing patent LIMA to LAD, patent SVG to OM, patent SVG to distal RCA, SVG to diagonal was occluded.  Clinically, no anginal symptoms.   2.  Ischemic cardiomyopathy with left ventricular ejection fraction of 45%-50%.  Does appear to have volume overload, especially some extravascular volume overload with pedal edema and a few crackles, and dyspnea on exertion.  This happened in the setting of discontinuing diuretics as noted above.  Also, some element of salt indiscretion.  Recommended the patient to keep salt intake, less than 2 grams and not to add over-the-counter salt.   3.  Chronic atrial fibrillation,  CHADS2-VASc score is quite elevated at least 6.  However, significant fall risk, recurrent falls and was not on oral anticoagulation for that reason through his long-term cardiologist back in [place].  I agree with this as recently last month, the patient had significant falls leading to head trauma as well as rotator cuff tear.  We will discontinue apixaban for this reason and add aspirin and Plavix, which offers  some protection but not as much as apixaban.  Risk/benefit of each of these studies were discussed with the patient and his niece and they want to discontinue apixaban and continue aspirin and Plavix.   4.  Status post ICD.  This happened in the setting of history of paroxysmal ventral tachycardia and inducible VT on EP study back in .  I do not see any documentation of patient receiving any shocks or ATP therapies.  He did have some episode of NSVT detected on device interrogation on reports available.  He will establish care in Device Clinic.   5.  Recurrent falls.   6.  Peripheral vascular disease with evidence of iliac artery aneurysm.  Eventually, the patient may benefit from following in our Vascular Clinic.  This can be discussed in followup visit.   7.  History of carotid endarterectomy.   8.  History of AAA aneurysm repair.   9.  Dyslipidemia.   10.  Frail status.      RECOMMENDATIONS:   1.  Discontinue apixaban.   2.  Start aspirin 81 mg daily.   3.  Start Plavix 75 mg daily.   4.  Start Lasix 20 mg daily.   5.  Check BMP in 1 week.   6.  Follow up in [facility] in 2 weeks.   7.  Continue carvedilol and Lipitor.   8.  Enroll in Device Clinic.   9.  Strongly recommend keeping salt intake, less than 2 grams per day.   10. I also recommend referal to EP for consideration of Watchman and referal to vascular with Dr Oconnell for follow up peripheral artery aneurysms- to be noted these discussions were not done as the visit was quite overwhelming in terms of various changes made. This can be discussed in next visit with the MARY which we will schedule in next 2 weeks.     Eight minutes of total time was spent with more than 50% in counseling and coordination of care.         UMA BAARHONA MD             D: 08/10/2018   T: 08/10/2018   MT: PHILLIP      Name:     CARI HICKMAN   MRN:      [id number]        Account:      [id number]   :      1930           Service Date: 08/10/2018      Document: [id number]            Outpatient Encounter Prescriptions as of 8/10/2018   Medication Sig Dispense Refill     acetaminophen (MAPAP) 500 MG CAPS 1000 mg three times daily       aspirin 81 MG EC tablet Take 1 tablet (81 mg) by mouth daily 90 tablet 3     atorvastatin (LIPITOR) 10 MG tablet Take 10 mg by mouth daily       carvedilol (COREG) 3.125 MG tablet Take 3.125 mg by mouth 2 times daily (with meals)       cholecalciferol (VITAMIN  -D) 1000 units capsule Take 2 capsules by mouth daily       clopidogrel (PLAVIX) 75 MG tablet Take 1 tablet (75 mg) by mouth daily 90 tablet 3     docusate sodium (STOOL SOFTENER) 100 MG tablet Take 100 mg by mouth daily       furosemide (LASIX) 20 MG tablet Take 1 tablet (20 mg) by mouth daily 90 tablet 3     lisinopril (PRINIVIL/ZESTRIL) 2.5 MG tablet Take 1 tablet (2.5 mg) by mouth daily 90 tablet 3     mirtazapine (REMERON) 7.5 mg TABS Take 15 mg by mouth At Bedtime       potassium chloride SA (K-DUR/KLOR-CON M) 10 MEQ CR tablet Take 10 mEq by mouth daily       ranitidine (ZANTAC) 150 MG tablet Take 150 mg by mouth daily       rivastigmine (EXELON) 1.5 MG capsule Take 1.5 mg by mouth 2 times daily       traZODone (DESYREL) 50 MG tablet Take 50 mg by mouth At Bedtime       [DISCONTINUED] apixaban ANTICOAGULANT (ELIQUIS) 2.5 MG tablet Take 2.5 mg by mouth 2 times daily       No facility-administered encounter medications on file as of 8/10/2018.        Again, thank you for allowing me to participate in the care of your patient.      Sincerely,    Huber Penaloza MD     [facility]

## 2018-08-10 NOTE — PROGRESS NOTES
Service Date: 08/10/2018      REASON FOR CARDIOLOGY VISIT:  Establish cardiology care.      HISTORY OF PRESENT ILLNESS:  Mr. Willis is a very pleasant but frail 88-year-old gentleman with complex history.  He is establishing cardiology care with us.  He is accompanied by his niece, Arabella.        In terms of cardiac history, the patient has history of CABG with LIMA to LAD, SVG to OM, SVG to distal RCA and SVG to diagonal.  He had a coronary angiogram in 2014 that showed patent LIMA to LAD, patent SVG to OM patent SVG to distal RCA, SVG to diagonal was occluded.  He also has ischemic cardiomyopathy with echocardiogram done in 02/2018 showing LVEF of 45%-50% with apical, septal, basal inferolateral segment and apical lateral segmental wall motion abnormality, a 3.9 cm aortic root.  The LV was moderately thickened, 2+ mitral regurgitation, 2 to 3+ aortic valve regurgitation, normal RV systolic function, severely dilated left atrium and moderate aortic valve stenosis.        The patient also has history of chronic atrial fibrillation and history of recurrent falls and was not found to be a candidate for oral anticoagulation because of that.  In fact, he was on aspirin, Plavix through his cardiologist, Dr. White at [facility].  He has history of abdominal aortic aneurysm repair in 2005, history of right carotid artery endarterectomy.  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        The patient has now moved from [place] to [place].  As noted above, he is establishing cardiology care with us.  Remarkably, the patient has significant history of recurrent falls.  He had 5 major falls in the last few months.  In 1 fall he hit his head and had a big laceration.  The other fall led to a rotator cuff tear again had trauma and he was admitted at [facility].  He was  found to have hypotension and at that time many of his cardiomyopathy medication doses were change.  Lasix was completely stopped.  Carvedilol dose was decreased.  Lisinopril dose was decreased.        Remarkably, it looks like patient is now on 2.5 mg b.i.d. of apixaban.  Neither the patient nor his niece know when this change was done.  To be noted, aspirin is listed as allergy due to bleeding, but he was on aspirin as well as Plavix for what looks like a long time back in [place] as recently as March this year, according to last progress note available.      Since stopping his Lasix, the patient has noticed increased leg swelling and some shortness of breath with exertion.  He denies any chest discomfort, otherwise.  The patient does acknowledge adding over-the-counter salt to his food.  He is presently on 3.125 mg b.i.d. of carvedilol, 10 mg daily of atorvastatin and as noted above, he is also on 2.5 mg b.i.d. of apixaban.  The BMP I have available from 07/27/2018 shows potassium of 3.4, creatinine of 0.67, sodium of 143, bicarb of 28.      PHYSICAL EXAMINATION:   VITAL SIGNS:  Blood pressure 146/64, heart rate 70, weight 157 pounds, BMI 24.03.   GENERAL:  The patient appears pleasant, comfortable, frail.   NECK:  JVP appears normal without any hepatojugular reflexes.   CARDIOVASCULAR SYSTEM:  Somewhat irregular.  There is grade 2/6 systolic murmur heard best over the left lower sternal border, no S3, S4, rub or gallop.  There is grade 2/6 ejection systolic murmur heard over the right upper sternal border, no S3, S4.   RESPIRATORY:  A very few crackles at the bases.   ABDOMEN:  Soft, nontender.   EXTREMITIES:  There is about 1+ pitting pedal edema bilaterally.   PSYCHIATRIC:  Normal affect.   SKIN:  No obvious rash.   HEENT:  No significant pallor, no icterus.      EKG done today was personally reviewed by me shows what looks like probably atrial fibrillation with right bundle branch block.      IMPRESSION  AND PLAN:  A pleasant but frail 88-year-old gentleman with a complex cardiac history with history of CAD with history of CABG, ischemic cardiomyopathy with LVEF around 45%-50%, chronic atrial fibrillation, was not on oral medication for a long time because of fall risk, recurrent falls mechanical in nature with significant trauma as noted above, carotid endarterectomy history, history of abdominal aortic aneurysm repair, ICD implantation and dyslipidemia.     Overall, cardiac status-wise, the patient does appear to have some volume overload with increased pedal edema and a few crackles at the bases and some dyspnea on exertion.  The patient and his niece both very wary of higher doses of diuretics, especially given that he was hypotensive and he was recently admitted to [facility].  I, however, do recommend use of diuretics as there is clearcut evidence of volume overload.  I recommend starting at low dose 20 mg daily of Lasix.  I also recommend adding low-dose lisinopril given underlying ischemic cardiomyopathy and blood pressure is elevated today.  We can start 2.5 mg daily of lisinopril.     We had a long discussion about the option of apixaban, which he is on.  We are not clear when this was started but this was not on when patient was last seen by his longstanding cardiologist back in [place] about 5 months ago.  In fact, there is significant concern about his recurrent falls due to strong oral anticoagulants.  In fact the recommended dose with his kidney functions and his weight and age is 5 mg twice a day.  After a long discussion, weighing pros and cons, the patient wishes to go back on combination of aspirin, Plavix which he was on for a long time and was tolerated quite well.  Although aspirin is listed as an allergy, but he was clearly on aspirin for a long time.  We will resume baby aspirin and 75 mg daily of Plavix and discontinue apixaban at this time, for reasons noted above.  I  also recommend establishing care in our Device Clinic for regular device followup.     I recommend close followup in about 2 weeks' time to see how he is doing in terms of pedal edema and shortness of breath.  I recommend checking a BMP in 1 week's time because of the changes we made today.  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   1.  CAD, status post 4-vessel CABG with coronary angiogram in 2014 showing patent LIMA to LAD, patent SVG to OM, patent SVG to distal RCA, SVG to diagonal was occluded.  Clinically, no anginal symptoms.   2.  Ischemic cardiomyopathy with left ventricular ejection fraction of 45%-50%.  Does appear to have volume overload, especially some extravascular volume overload with pedal edema and a few crackles, and dyspnea on exertion.  This happened in the setting of discontinuing diuretics as noted above.  Also, some element of salt indiscretion.  Recommended the patient to keep salt intake, less than 2 grams and not to add over-the-counter salt.   3.  Chronic atrial fibrillation,  CHADS2-VASc score is quite elevated at least 6.  However, significant fall risk, recurrent falls and was not on oral anticoagulation for that reason through his long-term cardiologist back in [place].  I agree with this as recently last month, the patient had significant falls leading to head trauma as well as rotator cuff tear.  We will discontinue apixaban for this reason and add aspirin and Plavix, which offers some protection but not as much as apixaban.  Risk/benefit of each of these studies were discussed with the patient and his niece and they want to discontinue apixaban and continue aspirin and Plavix.   4.  Status post ICD.  This happened in the  setting of history of paroxysmal ventral tachycardia and inducible VT on EP study back in .  I do not see any documentation of patient receiving any shocks or ATP therapies.  He did have some episode of NSVT detected on device interrogation on reports available.  He will establish care in Device Clinic.   5.  Recurrent falls.   6.  Peripheral vascular disease with evidence of iliac artery aneurysm.  Eventually, the patient may benefit from following in our Vascular Clinic.  This can be discussed in followup visit.   7.  History of carotid endarterectomy.   8.  History of AAA aneurysm repair.   9.  Dyslipidemia.   10.  Frail status.      RECOMMENDATIONS:   1.  Discontinue apixaban.   2.  Start aspirin 81 mg daily.   3.  Start Plavix 75 mg daily.   4.  Start Lasix 20 mg daily.   5.  Check BMP in 1 week.   6.  Follow up in [facility] in 2 weeks.   7.  Continue carvedilol and Lipitor.   8.  Enroll in Device Clinic.   9.  Strongly recommend keeping salt intake, less than 2 grams per day.   10. I also recommend referal to EP for consideration of Watchman and referal to vascular with Dr Oconnell for follow up peripheral artery aneurysms- to be noted these discussions were not done as the visit was quite overwhelming in terms of various changes made. This can be discussed in next visit with the MARY which we will schedule in next 2 weeks.     Eight minutes of total time was spent with more than 50% in counseling and coordination of care.         UMA BARAHONA MD             D: 08/10/2018   T: 08/10/2018   MT: PHILLIP      Name:     CARI HICKMAN   MRN:      [id number]        Account:      [id number]   :      1930           Service Date: 08/10/2018      Document: [id number]

## 2018-08-24 NOTE — LETTER
8/24/2018    [facility]  [address]  [facility] [address]    RE: Salo Willis       Dear Colleague,    I had the pleasure of seeing Salo Willis in the [facility].      Cardiology Progress Note    Date of Service: 08/24/2018  Patient seen today in follow up of: CORE enrollment  Primary cardiologist: Dr. Penaloza    HPI:  Salo Willis is a very pleasant 88-year-old male who recently moved here from [place] and was recently seen in clinic to establish local cardiology care.  He has quite an extensive cardiac history.  He has a history of a CABG with a LIMA to the LAD, SVG to OM, SVG to the distal RCA, and SVG to the diagonal.  An angiogram in 2014 showed a patent LIMA to the LAD, patent SVG to OM, patent SVG to the distal RCA.  The SVG to diagonal was occluded.  He has a mild ischemic cardiomyopathy with his last echocardiogram in February 2018 showed an LVEF of 45-50% with apical, septal, and basal inferior lateral segment and apical lateral segmental wall motion abnormality.  Additionally, he has a history of chronic atrial fibrillation and recurrent falls.  He has not been anticoagulated because of this but has been maintained on aspirin and Plavix who his cardiologist at [facility].  He had an abdominal aortic aneurysm repair in 2005 and a prior right carotid endarterectomy.  Additionally, according to his niece he has a history of TIAs, paroxysmal ventricular tachycardia with ICD implantation in 2008 after having inducible VT on EP study.    He was seen by Dr. Penaloza on 8/10/2018.  Of note, he has had 5 major falls in the last few months.  He fell and hit his head sustaining a big laceration during 1 of these falls.  Another line to a rotator cuff tear again and required admission to [facility].  During that admission, he was hypotensive and many of his cardiomyopathy medication doses were changed.  His Lasix  was completely stopped.  His carvedilol and lisinopril doses were decreased.    When he saw Dr. Penaloza, it was noted that he was on 2.5 mg twice daily of Eliquis.  He had a the patient nor his niece knew when this had changed.  He had also noted some progressive shortness of breath on exertion and increased leg swelling since stopping his Lasix.    He is here today with his daughter in law, Geno. He is residing at [facility]. His son, daughter in law and granddaughter live four minutes a day and see him daily. Since started the lasix, his weight is down. His daugther in law thinks his dyspnea on exertion and orthopnea have improved as well as his peripheral edema. He continues to have pedal edema.     He eats all of his meals at the [facility] unless he goes out with his family. He has not had any falls since his visit with Dr. Penaloza.      ASSESSMENT/PLAN:  1.  CAD. With prior CABG x 4. Angiogram showed occluded SVG to diagonal in 2014 but his three other grafts were patent. Clinically, he has no symptoms to suggest angina.   2.  Mild ischemic cardiomyopathy. With LVEF of 45 - 50% by echocardiogram in February. He appeared volume overloaded at his last clinic visit and lasix 20 mg daily was started.  3.  Chronic systolic congestive heart failure. He appeared fluid overloaded at his last visit and lasix 20 mg daily was resumed. His weight is down 4 lbs from his last visit to 153.  Symptomatically, he has improved with this. His family his concerned about increasing his dose as he has become hypotensive and fallen in the past on higher doses. Therefore, we will continue the 20 mg for now as his symptoms have clearly improved. I asked the [facility] to call with a weight gain of 2 lbs in a day or 5 lbs in a week or with any increased symptoms.   4.  History of inducible ventricular tachycardia s/p ICD implantation in 2008. He was seen in the device clinic today.   5.  Chronic atrial fibrillation. He has a history of  frequent falls and has had 5 falls over the last month. He is rate controlled. His [id number] is at least 6.  He was recently on Eliquis. After discussion with Dr. Penaloza, he has decided to go back on aspirin and plavix which he has tolerated previously. Dr. Penaloza recommended an EP referral to possibly discuss the Watchman procedure. I discussed this with he and his daughter today. They have been more conservative when considering invasive procedures in the past and would be hesitant to proceed with anything requiring general anesthesia.   6.  Hypertension. Well controlled today.   7.  History of TIA.  8.  AAA. S/p repair in 2005.  9.  PVD. Dr. Penaloza has recommended referral to Dr. Oconnell with vascular given his underlying vascular disease. Per Dr. 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 I reviewed Dr. Penaloza's recommendation today and they are agreeable.     This note was completed in part using Dragon voice recognition software. Although reviewed after completion, some word and grammatical errors may occur.    Orders this Visit:  Orders Placed This Encounter   Procedures     Basic metabolic panel     Follow-Up with Vascular Cardiologist     Follow-Up with [facility] Clinic - MARY visit     No orders of the defined types were placed in this encounter.    There are no discontinued medications.    CURRENT MEDICATIONS:  Current Outpatient Prescriptions   Medication Sig Dispense Refill     acetaminophen (MAPAP) 500 MG CAPS 1000 mg three times daily       aspirin 81 MG EC tablet Take 1 tablet (81 mg) by mouth daily 90 tablet 3     atorvastatin (LIPITOR) 10 MG tablet Take 10 mg by mouth daily       carvedilol (COREG) 3.125 MG tablet Take 3.125 mg by mouth 2 times daily (with meals)       cholecalciferol (VITAMIN  -D) 1000 units capsule Take 2 capsules by mouth daily       clopidogrel  (PLAVIX) 75 MG tablet Take 1 tablet (75 mg) by mouth daily 90 tablet 3     docusate sodium (STOOL SOFTENER) 100 MG tablet Take 100 mg by mouth daily       furosemide (LASIX) 20 MG tablet Take 1 tablet (20 mg) by mouth daily 90 tablet 3     lisinopril (PRINIVIL/ZESTRIL) 2.5 MG tablet Take 1 tablet (2.5 mg) by mouth daily 90 tablet 3     mirtazapine (REMERON) 7.5 mg TABS Take 15 mg by mouth At Bedtime       potassium chloride SA (K-DUR/KLOR-CON M) 10 MEQ CR tablet Take 10 mEq by mouth daily       ranitidine (ZANTAC) 150 MG tablet Take 150 mg by mouth daily       rivastigmine (EXELON) 1.5 MG capsule Take 1.5 mg by mouth 2 times daily       traZODone (DESYREL) 50 MG tablet Take 50 mg by mouth At Bedtime       ALLERGIES  Allergies   Allergen Reactions     Niacin      PAST MEDICAL HISTORY:  Past Medical History:   Diagnosis Date     Abdominal aortic aneurysm (H)      AICD (automatic cardioverter/defibrillator) present      Atrial fibrillation (H)      CAD (coronary artery disease)      Cardiomyopathy (H)      Carotid artery disease (H)      CHF (congestive heart failure) (H)      GERD (gastroesophageal reflux disease)      Hyperlipidemia      Inguinal hernia      Paroxysmal VT (H)      Prostate cancer (H)      S/P AAA repair      S/P CABG (coronary artery bypass graft)      S/P TURP      PAST SURGICAL HISTORY:  Past Surgical History:   Procedure Laterality Date     AICD, DUAL CHAMBER  08/29/2008    [facility] DR ACUÑA [id number]. Implanted by Dr. Marks with [facility] in [place].     CAROTID ENDARTERECTOMY Right      CORONARY ARTERY BYPASS      LIMA to LAD, SVG to OM, SVG to distal RCA, and SVG to Diagonal     HEART CATH LEFT HEART CATH  10/17/2014    LM, RCA, LAD, and left CFX occlusions: patent LIMA graft to LAD, patent SVGs to OM and distal RCA. Vein graft to diagonal previously found to be occluded. Mild aortic stenosis with 20 mmHg gradient across the aortic valve. Continue medical therapy.      FAMILY  "HISTORY:  Family History   Problem Relation Age of Onset     Unknown/Adopted Mother      Unknown/Adopted Father      SOCIAL HISTORY:  Social History     Social History     Marital status:      Spouse name: N/A     Number of children: N/A     Years of education: N/A     Social History Main Topics     Smoking status: Former Smoker     Types: Cigarettes     Smokeless tobacco: Former User      Comment: smoked as a \"young man\"      Alcohol use None     Drug use: None     Sexual activity: Not Asked     Other Topics Concern     None     Social History Narrative     Review of Systems:  Skin:  Positive for bruising   Eyes:  Positive for glasses  ENT:  Positive for hearing loss  Respiratory:  Negative    Cardiovascular:    fatigue;exercise intolerance;Positive for;edema;lightheadedness;dizziness  Gastroenterology: Negative    Genitourinary:  Positive for urinary frequency  Musculoskeletal:  Positive for muscular weakness  Neurologic:  Positive for stroke;memory problems  Psychiatric:  Negative    Heme/Lymph/Imm:  Positive for easy bruising  Endocrine:  Negative       Physical Exam:  Vitals: /60  Pulse 68  Ht 1.727 m (5' 8\")  Wt 69.5 kg (153 lb 3.2 oz)  SpO2 95%  BMI 23.29 kg/m2   Wt Readings from Last 4 Encounters:   08/24/18 69.5 kg (153 lb 3.2 oz)   08/10/18 71.5 kg (157 lb 11.2 oz)     GEN: well nourished, in no acute distress.  HEENT:  Pupils equal, round. Sclerae nonicteric.   NECK: Supple, no masses appreciated.   C/V:  Regular rate and rhythm, II/VI systolic murmur at the RSB  RESP: Respirations are unlabored. Clear to auscultation bilaterally without wheezing, rales, or rhonchi.  GI: Abdomen soft, nontender.  EXTREM: 1+ primarily pedal edema.  NEURO: Alert and oriented, cooperative.  SKIN: Warm and dry.     Recent Lab Results:  LIPID RESULTS:  No results found for: CHOL, HDL, LDL, TRIG, CHOLHDLRATIO  LIVER ENZYME RESULTS:  No results found for: AST, ALT  CBC RESULTS:  No results found for: WBC, RBC, " HGB, HCT, MCV, MCH, MCHC, RDW, PLT  BMP RESULTS:  Lab Results   Component Value Date     08/24/2018    POTASSIUM 4.2 08/24/2018    CHLORIDE 104 08/24/2018    CO2 32 (H) 08/24/2018    ANIONGAP 10.2 08/24/2018    GLC 92 08/24/2018    BUN 29 08/24/2018    CR 0.91 08/24/2018    GFRESTIMATED 79 08/24/2018    GFRESTBLACK >90 08/24/2018    LUCIEN 9.3 08/24/2018      A1C RESULTS:  No results found for: A1C  INR RESULTS:  No results found for: INR    New/Pertinent imaging results since last visit:  None          Thank you for allowing me to participate in the care of your patient.    Sincerely,     Yanni Baker PA-C     [facility]

## 2018-08-24 NOTE — MR AVS SNAPSHOT
After Visit Summary   8/24/2018    Salo Willis    MRN: [id number]           Patient Information     Date Of Birth          7/5/1930        Visit Information        Provider Department      8/24/2018 1:00 PM [facility] D[facility]        Today's Diagnoses     ICD (implantable cardioverter-defibrillator), dual, in situ    -  1       Follow-ups after your visit        Who to contact     If you have questions or need follow up information about today's clinic visit or your schedule please contact [facility] directly at [phone].  Normal or non-critical lab and imaging results will be communicated to you by MyChart, letter or phone within 4 business days after the clinic has received the results. If you do not hear from us within 7 days, please contact the clinic through MyChart or phone. If you have a critical or abnormal lab result, we will notify you by phone as soon as possible.  Submit refill requests through [employer] or call your pharmacy and they will forward the refill request to us. Please allow 3 business days for your refill to be completed.          Additional Information About Your Visit        Care EveryWhere ID     This is your Care EveryWhere ID. This could be used by other organizations to access your [place] medical records  [id number]         Blood Pressure from Last 3 Encounters:   08/24/18 122/60   08/10/18 146/64    Weight from Last 3 Encounters:   08/24/18 69.5 kg (153 lb 3.2 oz)   08/10/18 71.5 kg (157 lb 11.2 oz)              We Performed the Following     ICD DEVICE PROGRAMMING EVAL, DUAL LEAD ICD (63188)        Primary Care Provider Fax #    [facility] [phone]       [address] [place] [id number]        Equal Access to Services     KENRICK JIM AH: Hubert Jacome, mary carmen flores, liang monte  carmita hoganaamary ah. So [place] [phone].    ATENCIÓN: Si delia russo, tiene a pham disposición servicios gratuitos de asistencia lingüística. Alexis al [phone].    We comply with applicable federal civil rights laws and [place] laws. We do not discriminate on the basis of race, color, national origin, age, disability, sex, sexual orientation, or gender identity.            Thank you!     Thank you for choosing [facility]  for your care. Our goal is always to provide you with excellent care. Hearing back from our patients is one way we can continue to improve our services. Please take a few minutes to complete the written survey that you may receive in the mail after your visit with us. Thank you!             Your Updated Medication List - Protect others around you: Learn how to safely use, store and throw away your medicines at www.disposemymeds.org.          This list is accurate as of 8/24/18  2:44 PM.  Always use your most recent med list.                   Brand Name Dispense Instructions for use Diagnosis    aspirin 81 MG EC tablet     90 tablet    Take 1 tablet (81 mg) by mouth daily    S/P CABG (coronary artery bypass graft)       atorvastatin 10 MG tablet    LIPITOR     Take 10 mg by mouth daily        carvedilol 3.125 MG tablet    COREG     Take 3.125 mg by mouth 2 times daily (with meals)        cholecalciferol 1000 units capsule    vitamin  -D     Take 2 capsules by mouth daily        clopidogrel 75 MG tablet    PLAVIX    90 tablet    Take 1 tablet (75 mg) by mouth daily    S/P CABG (coronary artery bypass graft), Chronic atrial fibrillation (H)       furosemide 20 MG tablet    LASIX    90 tablet    Take 1 tablet (20 mg) by mouth daily    Ischemic cardiomyopathy       lisinopril 2.5 MG tablet    PRINIVIL/Zestril    90 tablet    Take 1 tablet (2.5 mg) by mouth daily    S/P CABG (coronary artery bypass graft), Chronic atrial fibrillation (H)       MAPAP 500 MG  Caps   Generic drug:  acetaminophen      1000 mg three times daily        mirtazapine 7.5 mg Tabs    REMERON     Take 15 mg by mouth At Bedtime        potassium chloride SA 10 MEQ CR tablet    K-DUR/KLOR-CON M     Take 10 mEq by mouth daily        ranitidine 150 MG tablet    ZANTAC     Take 150 mg by mouth daily        rivastigmine 1.5 MG capsule    EXELON     Take 1.5 mg by mouth 2 times daily        STOOL SOFTENER 100 MG tablet   Generic drug:  docusate sodium      Take 100 mg by mouth daily        traZODone 50 MG tablet    DESYREL     Take 50 mg by mouth At Bedtime

## 2018-08-24 NOTE — MR AVS SNAPSHOT
After Visit Summary   8/24/2018    Salo Willis    MRN: [id number]           Patient Information     Date Of Birth          7/5/1930        Visit Information        Provider Department      8/24/2018 2:30 PM Yanni Baker PA-C [facility]   [place]        Today's Diagnoses     Carotid artery disease, unspecified laterality (H)    -  1    Ischemic cardiomyopathy        Abdominal aortic aneurysm (AAA) without rupture (H)        Paroxysmal VT (H)          Care Instructions    Call CORE nurse for any questions or concerns:  [phone]   *If you have concerns after hours, please call [phone], option 2 to speak with on call Cardiologist.    1. Medication changes and/or recommendations from today:   Orders:    -  No medication changes today.  -  Please weight three times a week.   -  Call the CORE nurses with a weight gain of more than 2 pounds in one day or 5 pounds in one week or with any shortness of breath or leg swelling.      2. Follow up plan: follow up with Suzette PERSAUD in 2 months, sooner with issues.  Follow up with Dr. Oconnell about your vascular issues as well.     3. Continue low sodium diet (less than 2000 mg daily). If you eat less salt, you will retain less fluid.     4. Alcohol can weaken your heart further. You should avoid alcohol or limit its use to special times, such as a holiday or birthday.      5. Do NOT take Aleve or ibuprofen without talking to your doctor first.      9. Lab Results: labs today look great.  Component      Latest Ref Rng & Units 8/24/2018   Sodium      136 - 145 mmol/L 142   Potassium      3.5 - 5.1 mmol/L 4.2   Chloride      98 - 107 mmol/L 104   Carbon Dioxide      23 - 29 mmol/L 32 (H)   Anion Gap      6 - 17 mmol/L 10.2   Glucose      70 - 105 mg/dL 92   Urea Nitrogen      7 - 30 mg/dL 29   Creatinine      0.70 - 1.30 mg/dL 0.91   GFR Estimate      >60 mL/min/1.7m2 79   GFR Estimate If Black      >60 mL/min/1.7m2 >90    Calcium      8.5 - 10.5 mg/dL 9.3        CORE Clinic: Cardiomyopathy, Optimization, Rehabilitation, Education  The CORE Clinic is a heart failure specialty clinic within the [facility] where you will work with specialized nurse practitioners, physician assistants, doctors, and registered nurses. They are dedicated to helping patients with heart failure to carefully adjust medications, receive education, and learn who and when to call if symptoms develop. They specialize in helping you better understand your condition, slow the progression of your disease, improve the length and quality of your life, help you detect future heart problems before they become life threatening, and avoid hospitalizations.                Follow-ups after your visit        Additional Services     Follow-Up with Vascular Cardiologist           Follow-Up with CORE Clinic - MARY visit                 Future tests that were ordered for you today     Open Future Orders        Priority Expected Expires Ordered    Follow-Up with CORE Clinic - MARY visit Routine 10/19/2018 8/24/2019 8/24/2018    Basic metabolic panel Routine 10/19/2018 8/24/2019 8/24/2018    Follow-Up with Vascular Cardiologist Routine 9/23/2018 8/24/2019 8/24/2018            Who to contact     If you have questions or need follow up information about today's clinic visit or your schedule please contact [facility] directly at [phone].  Normal or non-critical lab and imaging results will be communicated to you by MyChart, letter or phone within 4 business days after the clinic has received the results. If you do not hear from us within 7 days, please contact the clinic through MyChart or phone. If you have a critical or abnormal lab result, we will notify you by phone as soon as possible.  Submit refill requests through [employer] or call your pharmacy and they will forward the refill request to us. Please allow 3 business days  "for your refill to be completed.          Additional Information About Your Visit        Care EveryWhere ID     This is your Care EveryWhere ID. This could be used by other organizations to access your [place] medical records  [id number]        Your Vitals Were     Pulse Height Pulse Oximetry BMI (Body Mass Index)          68 1.727 m (5' 8\") 95% 23.29 kg/m2         Blood Pressure from Last 3 Encounters:   08/24/18 122/60   08/10/18 146/64    Weight from Last 3 Encounters:   08/24/18 69.5 kg (153 lb 3.2 oz)   08/10/18 71.5 kg (157 lb 11.2 oz)               Primary Care Provider Fax #    [facility] [phone]       [address] [place] [address]        Equal Access to Services     KENRICK JIM : Hadii domi dhaliwal hadasho Soomaali, waaxda luqadaha, qaybta kaalmada adeegyada, liang alvarado haysarah harrell. So [facility] [phone].    ATENCIÓN: Si habla español, tiene a pham disposición servicios gratuitos de asistencia lingüística. Llame al [phone].    We comply with applicable federal civil rights laws and [place] laws. We do not discriminate on the basis of race, color, national origin, age, disability, sex, sexual orientation, or gender identity.            Thank you!     Thank you for choosing [facility]  for your care. Our goal is always to provide you with excellent care. Hearing back from our patients is one way we can continue to improve our services. Please take a few minutes to complete the written survey that you may receive in the mail after your visit with us. Thank you!             Your Updated Medication List - Protect others around you: Learn how to safely use, store and throw away your medicines at www.disposemymeds.org.          This list is accurate as of 8/24/18  3:06 PM.  Always use your most recent med list.                   Brand Name Dispense Instructions for use Diagnosis    aspirin 81 MG EC tablet     " 90 tablet    Take 1 tablet (81 mg) by mouth daily    S/P CABG (coronary artery bypass graft)       atorvastatin 10 MG tablet    LIPITOR     Take 10 mg by mouth daily        carvedilol 3.125 MG tablet    COREG     Take 3.125 mg by mouth 2 times daily (with meals)        cholecalciferol 1000 units capsule    vitamin  -D     Take 2 capsules by mouth daily        clopidogrel 75 MG tablet    PLAVIX    90 tablet    Take 1 tablet (75 mg) by mouth daily    S/P CABG (coronary artery bypass graft), Chronic atrial fibrillation (H)       furosemide 20 MG tablet    LASIX    90 tablet    Take 1 tablet (20 mg) by mouth daily    Ischemic cardiomyopathy       lisinopril 2.5 MG tablet    PRINIVIL/Zestril    90 tablet    Take 1 tablet (2.5 mg) by mouth daily    S/P CABG (coronary artery bypass graft), Chronic atrial fibrillation (H)       MAPAP 500 MG Caps   Generic drug:  acetaminophen      1000 mg three times daily        mirtazapine 7.5 mg Tabs    REMERON     Take 15 mg by mouth At Bedtime        potassium chloride SA 10 MEQ CR tablet    K-DUR/KLOR-CON M     Take 10 mEq by mouth daily        ranitidine 150 MG tablet    ZANTAC     Take 150 mg by mouth daily        rivastigmine 1.5 MG capsule    EXELON     Take 1.5 mg by mouth 2 times daily        STOOL SOFTENER 100 MG tablet   Generic drug:  docusate sodium      Take 100 mg by mouth daily        traZODone 50 MG tablet    DESYREL     Take 50 mg by mouth At Bedtime

## 2018-08-24 NOTE — PATIENT INSTRUCTIONS
Call CORE nurse for any questions or concerns:  [phone]   *If you have concerns after hours, please call [phone], option 2 to speak with on call Cardiologist.    1. Medication changes and/or recommendations from today:   Orders:    -  No medication changes today.  -  Please weight three times a week.   -  Call the CORE nurses with a weight gain of more than 2 pounds in one day or 5 pounds in one week or with any shortness of breath or leg swelling.      2. Follow up plan: follow up with Suzette PERSAUD in 2 months, sooner with issues.  Follow up with Dr. Oconnell about your vascular issues as well.     3. Continue low sodium diet (less than 2000 mg daily). If you eat less salt, you will retain less fluid.     4. Alcohol can weaken your heart further. You should avoid alcohol or limit its use to special times, such as a holiday or birthday.      5. Do NOT take Aleve or ibuprofen without talking to your doctor first.      9. Lab Results: labs today look great.  Component      Latest Ref Rng & Units 8/24/2018   Sodium      136 - 145 mmol/L 142   Potassium      3.5 - 5.1 mmol/L 4.2   Chloride      98 - 107 mmol/L 104   Carbon Dioxide      23 - 29 mmol/L 32 (H)   Anion Gap      6 - 17 mmol/L 10.2   Glucose      70 - 105 mg/dL 92   Urea Nitrogen      7 - 30 mg/dL 29   Creatinine      0.70 - 1.30 mg/dL 0.91   GFR Estimate      >60 mL/min/1.7m2 79   GFR Estimate If Black      >60 mL/min/1.7m2 >90   Calcium      8.5 - 10.5 mg/dL 9.3        [facility]: Cardiomyopathy, Optimization, Rehabilitation, Education  The [facility] is a heart failure specialty clinic within the [facility] where you will work with specialized nurse practitioners, physician assistants, doctors, and registered nurses. They are dedicated to helping patients with heart failure to carefully adjust medications, receive education, and learn who and when to call if symptoms develop. They specialize in helping you better understand your condition,  slow the progression of your disease, improve the length and quality of your life, help you detect future heart problems before they become life threatening, and avoid hospitalizations.

## 2018-08-24 NOTE — PROGRESS NOTES
[employer] DR ICD Remote Device Check/ Yanni Liegl/ new pt   AP: permanent AF : <1 %  Mode: VVIR        Presenting Rhythm: AF/ controlled VS  Heart Rate: Histogram is adequate and stable   Sensing: Stable    Pacing Threshold: stable    Impedance: WNL  Battery Status: 5.7-7 years estimated longevity  Atrial Arrhythmia: permanent AF/ recently changed from Eliquis to Plavix per Dr Penaloza for frequent falls  Ventricular Arrhythmia: NONE  ATP: NONE    Shocks: NONE    Care Plan: Remote transmission in 3 months. Will order new monitor. sml

## 2018-08-24 NOTE — LETTER
8/24/2018    [facility]  [address]  [place] [address]    RE: Salo Willis       Dear Colleague,    I had the pleasure of seeing Salo Willis in the [facility].      Cardiology Progress Note    Date of Service: 08/24/2018  Patient seen today in follow up of: CORE enrollment  Primary cardiologist: Dr. Penaloza    HPI:  Salo Willis is a very pleasant 88-year-old male who recently moved here from [place] and was recently seen in clinic to establish local cardiology care.  He has quite an extensive cardiac history.  He has a history of a CABG with a LIMA to the LAD, SVG to OM, SVG to the distal RCA, and SVG to the diagonal.  An angiogram in 2014 showed a patent LIMA to the LAD, patent SVG to OM, patent SVG to the distal RCA.  The SVG to diagonal was occluded.  He has a mild ischemic cardiomyopathy with his last echocardiogram in February 2018 showed an LVEF of 45-50% with apical, septal, and basal inferior lateral segment and apical lateral segmental wall motion abnormality.  Additionally, he has a history of chronic atrial fibrillation and recurrent falls.  He has not been anticoagulated because of this but has been maintained on aspirin and Plavix who his cardiologist at [facility].  He had an abdominal aortic aneurysm repair in 2005 and a prior right carotid endarterectomy.  Additionally, according to his niece he has a history of TIAs, paroxysmal ventricular tachycardia with ICD implantation in 2008 after having inducible VT on EP study.    He was seen by Dr. Penaloza on 8/10/2018.  Of note, he has had 5 major falls in the last few months.  He fell and hit his head sustaining a big laceration during 1 of these falls.  Another line to a rotator cuff tear again and required admission to [facility].  During that admission, he was hypotensive and many of his cardiomyopathy medication doses were changed.  His Lasix  was completely stopped.  His carvedilol and lisinopril doses were decreased.    When he saw Dr. Penaloza, it was noted that he was on 2.5 mg twice daily of Eliquis.  He had a the patient nor his niece knew when this had changed.  He had also noted some progressive shortness of breath on exertion and increased leg swelling since stopping his Lasix.    He is here today with his daughter in law, Geno. He is residing at [facility]. His son, daughter in law and granddaughter live four minutes a day and see him daily. Since started the lasix, his weight is down. His daugther in law thinks his dyspnea on exertion and orthopnea have improved as well as his peripheral edema. He continues to have pedal edema.     He eats all of his meals at the [facility] unless he goes out with his family. He has not had any falls since his visit with Dr. Penaloza.      ASSESSMENT/PLAN:  1.  CAD. With prior CABG x 4. Angiogram showed occluded SVG to diagonal in 2014 but his three other grafts were patent. Clinically, he has no symptoms to suggest angina.   2.  Mild ischemic cardiomyopathy. With LVEF of 45 - 50% by echocardiogram in February. He appeared volume overloaded at his last clinic visit and lasix 20 mg daily was started.  3.  Chronic systolic congestive heart failure. He appeared fluid overloaded at his last visit and lasix 20 mg daily was resumed. His weight is down 4 lbs from his last visit to 153.  Symptomatically, he has improved with this. His family his concerned about increasing his dose as he has become hypotensive and fallen in the past on higher doses. Therefore, we will continue the 20 mg for now as his symptoms have clearly improved. I asked the [facility] to call with a weight gain of 2 lbs in a day or 5 lbs in a week or with any increased symptoms.   4.  History of inducible ventricular tachycardia s/p ICD implantation in 2008. He was seen in the device clinic today.   5.  Chronic atrial fibrillation. He has a history of  frequent falls and has had 5 falls over the last month. He is rate controlled. His [id number] is at least 6.  He was recently on Eliquis. After discussion with Dr. Penaloza, he has decided to go back on aspirin and plavix which he has tolerated previously. Dr. Penaloza recommended an EP referral to possibly discuss the Watchman procedure. I discussed this with he and his daughter today. They have been what seems appropriately conservative when pursuing more aggressive invasive procedures in the past. ***  6.  Hypertension. Well controlled today.   7.  History of TIA.  8.  AAA. S/p repair in 2005.  9.  PVD. Dr. Penaloza has recommended referral to Dr. Oconnell with vascular given his underlying vascular disease. Per Dr. 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 I reviewed Dr. Penaloza's recommendation today and they are agreeable.     This note was completed in part using Dragon voice recognition software. Although reviewed after completion, some word and grammatical errors may occur.    Orders this Visit:  Orders Placed This Encounter   Procedures     Basic metabolic panel     Follow-Up with Vascular Cardiologist     Follow-Up with [facility] - MARY visit     No orders of the defined types were placed in this encounter.    There are no discontinued medications.    CURRENT MEDICATIONS:  Current Outpatient Prescriptions   Medication Sig Dispense Refill     acetaminophen (MAPAP) 500 MG CAPS 1000 mg three times daily       aspirin 81 MG EC tablet Take 1 tablet (81 mg) by mouth daily 90 tablet 3     atorvastatin (LIPITOR) 10 MG tablet Take 10 mg by mouth daily       carvedilol (COREG) 3.125 MG tablet Take 3.125 mg by mouth 2 times daily (with meals)       cholecalciferol (VITAMIN  -D) 1000 units capsule Take 2 capsules by mouth daily       clopidogrel (PLAVIX) 75 MG tablet Take 1 tablet (75  mg) by mouth daily 90 tablet 3     docusate sodium (STOOL SOFTENER) 100 MG tablet Take 100 mg by mouth daily       furosemide (LASIX) 20 MG tablet Take 1 tablet (20 mg) by mouth daily 90 tablet 3     lisinopril (PRINIVIL/ZESTRIL) 2.5 MG tablet Take 1 tablet (2.5 mg) by mouth daily 90 tablet 3     mirtazapine (REMERON) 7.5 mg TABS Take 15 mg by mouth At Bedtime       potassium chloride SA (K-DUR/KLOR-CON M) 10 MEQ CR tablet Take 10 mEq by mouth daily       ranitidine (ZANTAC) 150 MG tablet Take 150 mg by mouth daily       rivastigmine (EXELON) 1.5 MG capsule Take 1.5 mg by mouth 2 times daily       traZODone (DESYREL) 50 MG tablet Take 50 mg by mouth At Bedtime       ALLERGIES  Allergies   Allergen Reactions     Niacin      PAST MEDICAL HISTORY:  Past Medical History:   Diagnosis Date     Abdominal aortic aneurysm (H)      AICD (automatic cardioverter/defibrillator) present      Atrial fibrillation (H)      CAD (coronary artery disease)      Cardiomyopathy (H)      Carotid artery disease (H)      CHF (congestive heart failure) (H)      GERD (gastroesophageal reflux disease)      Hyperlipidemia      Inguinal hernia      Paroxysmal VT (H)      Prostate cancer (H)      S/P AAA repair      S/P CABG (coronary artery bypass graft)      S/P TURP      PAST SURGICAL HISTORY:  Past Surgical History:   Procedure Laterality Date     AICD, DUAL CHAMBER  08/29/2008    [employer] DR ACUÑA 2207-36. Implanted by Dr. Marks with [facility] in [place].     CAROTID ENDARTERECTOMY Right      CORONARY ARTERY BYPASS      LIMA to LAD, SVG to OM, SVG to distal RCA, and SVG to Diagonal     HEART CATH LEFT HEART CATH  10/17/2014    LM, RCA, LAD, and left CFX occlusions: patent LIMA graft to LAD, patent SVGs to OM and distal RCA. Vein graft to diagonal previously found to be occluded. Mild aortic stenosis with 20 mmHg gradient across the aortic valve. Continue medical therapy.      FAMILY HISTORY:  Family History   Problem  "Relation Age of Onset     Unknown/Adopted Mother      Unknown/Adopted Father      SOCIAL HISTORY:  Social History     Social History     Marital status:      Spouse name: N/A     Number of children: N/A     Years of education: N/A     Social History Main Topics     Smoking status: Former Smoker     Types: Cigarettes     Smokeless tobacco: Former User      Comment: smoked as a \"young man\"      Alcohol use None     Drug use: None     Sexual activity: Not Asked     Other Topics Concern     None     Social History Narrative     Review of Systems:  Skin:  Positive for bruising   Eyes:  Positive for glasses  ENT:  Positive for hearing loss  Respiratory:  Negative    Cardiovascular:    fatigue;exercise intolerance;Positive for;edema;lightheadedness;dizziness  Gastroenterology: Negative    Genitourinary:  Positive for urinary frequency  Musculoskeletal:  Positive for muscular weakness  Neurologic:  Positive for stroke;memory problems  Psychiatric:  Negative    Heme/Lymph/Imm:  Positive for easy bruising  Endocrine:  Negative       Physical Exam:  Vitals: /60  Pulse 68  Ht 1.727 m (5' 8\")  Wt 69.5 kg (153 lb 3.2 oz)  SpO2 95%  BMI 23.29 kg/m2   Wt Readings from Last 4 Encounters:   08/24/18 69.5 kg (153 lb 3.2 oz)   08/10/18 71.5 kg (157 lb 11.2 oz)     GEN: well nourished, in no acute distress.  HEENT:  Pupils equal, round. Sclerae nonicteric.   NECK: Supple, no masses appreciated.   C/V:  Regular rate and rhythm, II/VI systolic murmur at the RSB  RESP: Respirations are unlabored. Clear to auscultation bilaterally without wheezing, rales, or rhonchi.  GI: Abdomen soft, nontender.  EXTREM: 1+ primarily pedal edema.  NEURO: Alert and oriented, cooperative.  SKIN: Warm and dry.     Recent Lab Results:  LIPID RESULTS:  No results found for: CHOL, HDL, LDL, TRIG, CHOLHDLRATIO  LIVER ENZYME RESULTS:  No results found for: AST, ALT  CBC RESULTS:  No results found for: WBC, RBC, HGB, HCT, MCV, MCH, MCHC, RDW, " PLT  BMP RESULTS:  Lab Results   Component Value Date     08/24/2018    POTASSIUM 4.2 08/24/2018    CHLORIDE 104 08/24/2018    CO2 32 (H) 08/24/2018    ANIONGAP 10.2 08/24/2018    GLC 92 08/24/2018    BUN 29 08/24/2018    CR 0.91 08/24/2018    GFRESTIMATED 79 08/24/2018    GFRESTBLACK >90 08/24/2018    LUCIEN 9.3 08/24/2018      A1C RESULTS:  No results found for: A1C  INR RESULTS:  No results found for: INR    New/Pertinent imaging results since last visit:  None    Yanni Baker PA-C  [facility]      Thank you for allowing me to participate in the care of your patient.      Sincerely,     Yanni Baker PA-C     [facility]    cc:   Huber Penaloza MD  [address]  [place] [address]

## 2018-08-24 NOTE — PROGRESS NOTES
Cardiology Progress Note    Date of Service: 08/24/2018  Patient seen today in follow up of: CORE enrollment  Primary cardiologist: Dr. Penaloza    HPI:  Salo Willis is a very pleasant 88-year-old male who recently moved here from [place] and was recently seen in clinic to establish local cardiology care.  He has quite an extensive cardiac history.  He has a history of a CABG with a LIMA to the LAD, SVG to OM, SVG to the distal RCA, and SVG to the diagonal.  An angiogram in 2014 showed a patent LIMA to the LAD, patent SVG to OM, patent SVG to the distal RCA.  The SVG to diagonal was occluded.  He has a mild ischemic cardiomyopathy with his last echocardiogram in February 2018 showed an LVEF of 45-50% with apical, septal, and basal inferior lateral segment and apical lateral segmental wall motion abnormality.  Additionally, he has a history of chronic atrial fibrillation and recurrent falls.  He has not been anticoagulated because of this but has been maintained on aspirin and Plavix who his cardiologist at [facility].  He had an abdominal aortic aneurysm repair in 2005 and a prior right carotid endarterectomy.  Additionally, according to his niece he has a history of TIAs, paroxysmal ventricular tachycardia with ICD implantation in 2008 after having inducible VT on EP study.    He was seen by Dr. Penaloza on 8/10/2018.  Of note, he has had 5 major falls in the last few months.  He fell and hit his head sustaining a big laceration during 1 of these falls.  Another line to a rotator cuff tear again and required admission to [facility].  During that admission, he was hypotensive and many of his cardiomyopathy medication doses were changed.  His Lasix was completely stopped.  His carvedilol and lisinopril doses were decreased.    When he saw Dr. Penaloza, it was noted that he was on 2.5 mg twice daily of Eliquis.  He had a the patient nor his niece knew when this had changed.  He had also noted some  progressive shortness of breath on exertion and increased leg swelling since stopping his Lasix.    He is here today with his daughter in law, Geno. He is residing at [facility]. His son, daughter in law and granddaughter live four minutes a day and see him daily. Since started the lasix, his weight is down. His daugther in law thinks his dyspnea on exertion and orthopnea have improved as well as his peripheral edema. He continues to have pedal edema.     He eats all of his meals at the [facility] unless he goes out with his family. He has not had any falls since his visit with Dr. Penaloza.      ASSESSMENT/PLAN:  1.  CAD. With prior CABG x 4. Angiogram showed occluded SVG to diagonal in 2014 but his three other grafts were patent. Clinically, he has no symptoms to suggest angina.   2.  Mild ischemic cardiomyopathy. With LVEF of 45 - 50% by echocardiogram in February. He appeared volume overloaded at his last clinic visit and lasix 20 mg daily was started.  3.  Chronic systolic congestive heart failure. He appeared fluid overloaded at his last visit and lasix 20 mg daily was resumed. His weight is down 4 lbs from his last visit to 153.  Symptomatically, he has improved with this. His family his concerned about increasing his dose as he has become hypotensive and fallen in the past on higher doses. Therefore, we will continue the 20 mg for now as his symptoms have clearly improved. I asked the [facility] to call with a weight gain of 2 lbs in a day or 5 lbs in a week or with any increased symptoms.   4.  History of inducible ventricular tachycardia s/p ICD implantation in 2008. He was seen in the device clinic today.   5.  Chronic atrial fibrillation. He has a history of frequent falls and has had 5 falls over the last month. He is rate controlled. His [id number] is at least 6.  He was recently on Eliquis. After discussion with Dr. Penaloza, he has decided to go back on aspirin and plavix which he has tolerated  previously. Dr. Penaloza recommended an EP referral to possibly discuss the Watchman procedure. I discussed this with he and his daughter today. They have been more conservative when considering invasive procedures in the past and would be hesitant to proceed with anything requiring general anesthesia.   6.  Hypertension. Well controlled today.   7.  History of TIA.  8.  AAA. S/p repair in 2005.  9.  PVD. Dr. Penaloza has recommended referral to Dr. Oconnell with vascular given his underlying vascular disease. Per Dr. 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 I reviewed Dr. Penaloza's recommendation today and they are agreeable.     This note was completed in part using Dragon voice recognition software. Although reviewed after completion, some word and grammatical errors may occur.    Orders this Visit:  Orders Placed This Encounter   Procedures     Basic metabolic panel     Follow-Up with Vascular Cardiologist     Follow-Up with [facility] - MARY visit     No orders of the defined types were placed in this encounter.    There are no discontinued medications.    CURRENT MEDICATIONS:  Current Outpatient Prescriptions   Medication Sig Dispense Refill     acetaminophen (MAPAP) 500 MG CAPS 1000 mg three times daily       aspirin 81 MG EC tablet Take 1 tablet (81 mg) by mouth daily 90 tablet 3     atorvastatin (LIPITOR) 10 MG tablet Take 10 mg by mouth daily       carvedilol (COREG) 3.125 MG tablet Take 3.125 mg by mouth 2 times daily (with meals)       cholecalciferol (VITAMIN  -D) 1000 units capsule Take 2 capsules by mouth daily       clopidogrel (PLAVIX) 75 MG tablet Take 1 tablet (75 mg) by mouth daily 90 tablet 3     docusate sodium (STOOL SOFTENER) 100 MG tablet Take 100 mg by mouth daily       furosemide (LASIX) 20 MG tablet Take 1 tablet (20 mg) by mouth daily 90 tablet 3      lisinopril (PRINIVIL/ZESTRIL) 2.5 MG tablet Take 1 tablet (2.5 mg) by mouth daily 90 tablet 3     mirtazapine (REMERON) 7.5 mg TABS Take 15 mg by mouth At Bedtime       potassium chloride SA (K-DUR/KLOR-CON M) 10 MEQ CR tablet Take 10 mEq by mouth daily       ranitidine (ZANTAC) 150 MG tablet Take 150 mg by mouth daily       rivastigmine (EXELON) 1.5 MG capsule Take 1.5 mg by mouth 2 times daily       traZODone (DESYREL) 50 MG tablet Take 50 mg by mouth At Bedtime       ALLERGIES  Allergies   Allergen Reactions     Niacin      PAST MEDICAL HISTORY:  Past Medical History:   Diagnosis Date     Abdominal aortic aneurysm (H)      AICD (automatic cardioverter/defibrillator) present      Atrial fibrillation (H)      CAD (coronary artery disease)      Cardiomyopathy (H)      Carotid artery disease (H)      CHF (congestive heart failure) (H)      GERD (gastroesophageal reflux disease)      Hyperlipidemia      Inguinal hernia      Paroxysmal VT (H)      Prostate cancer (H)      S/P AAA repair      S/P CABG (coronary artery bypass graft)      S/P TURP      PAST SURGICAL HISTORY:  Past Surgical History:   Procedure Laterality Date     AICD, DUAL CHAMBER  08/29/2008    [employer] DR ACUÑA 2207-36. Implanted by Dr. Marks with [facility] in [place].     CAROTID ENDARTERECTOMY Right      CORONARY ARTERY BYPASS      LIMA to LAD, SVG to OM, SVG to distal RCA, and SVG to Diagonal     HEART CATH LEFT HEART CATH  10/17/2014    LM, RCA, LAD, and left CFX occlusions: patent LIMA graft to LAD, patent SVGs to OM and distal RCA. Vein graft to diagonal previously found to be occluded. Mild aortic stenosis with 20 mmHg gradient across the aortic valve. Continue medical therapy.      FAMILY HISTORY:  Family History   Problem Relation Age of Onset     Unknown/Adopted Mother      Unknown/Adopted Father      SOCIAL HISTORY:  Social History     Social History     Marital status:      Spouse name: N/A     Number of children:  "N/A     Years of education: N/A     Social History Main Topics     Smoking status: Former Smoker     Types: Cigarettes     Smokeless tobacco: Former User      Comment: smoked as a \"young man\"      Alcohol use None     Drug use: None     Sexual activity: Not Asked     Other Topics Concern     None     Social History Narrative     Review of Systems:  Skin:  Positive for bruising   Eyes:  Positive for glasses  ENT:  Positive for hearing loss  Respiratory:  Negative    Cardiovascular:    fatigue;exercise intolerance;Positive for;edema;lightheadedness;dizziness  Gastroenterology: Negative    Genitourinary:  Positive for urinary frequency  Musculoskeletal:  Positive for muscular weakness  Neurologic:  Positive for stroke;memory problems  Psychiatric:  Negative    Heme/Lymph/Imm:  Positive for easy bruising  Endocrine:  Negative       Physical Exam:  Vitals: /60  Pulse 68  Ht 1.727 m (5' 8\")  Wt 69.5 kg (153 lb 3.2 oz)  SpO2 95%  BMI 23.29 kg/m2   Wt Readings from Last 4 Encounters:   08/24/18 69.5 kg (153 lb 3.2 oz)   08/10/18 71.5 kg (157 lb 11.2 oz)     GEN: well nourished, in no acute distress.  HEENT:  Pupils equal, round. Sclerae nonicteric.   NECK: Supple, no masses appreciated.   C/V:  Regular rate and rhythm, II/VI systolic murmur at the RSB  RESP: Respirations are unlabored. Clear to auscultation bilaterally without wheezing, rales, or rhonchi.  GI: Abdomen soft, nontender.  EXTREM: 1+ primarily pedal edema.  NEURO: Alert and oriented, cooperative.  SKIN: Warm and dry.     Recent Lab Results:  LIPID RESULTS:  No results found for: CHOL, HDL, LDL, TRIG, CHOLHDLRATIO  LIVER ENZYME RESULTS:  No results found for: AST, ALT  CBC RESULTS:  No results found for: WBC, RBC, HGB, HCT, MCV, MCH, MCHC, RDW, PLT  BMP RESULTS:  Lab Results   Component Value Date     08/24/2018    POTASSIUM 4.2 08/24/2018    CHLORIDE 104 08/24/2018    CO2 32 (H) 08/24/2018    ANIONGAP 10.2 08/24/2018    GLC 92 08/24/2018    BUN " 29 08/24/2018    CR 0.91 08/24/2018    GFRESTIMATED 79 08/24/2018    GFRESTBLACK >90 08/24/2018    LUCIEN 9.3 08/24/2018      A1C RESULTS:  No results found for: A1C  INR RESULTS:  No results found for: INR    New/Pertinent imaging results since last visit:  None    Yanni Baker PA-C  [facility]

## 2018-10-12 PROBLEM — T14.8XXA HEMATOMA: Status: ACTIVE | Noted: 2018-01-01

## 2018-10-12 NOTE — ED NOTES
Bed: ED23  Expected date:   Expected time:   Means of arrival:   Comments:  Marlen 427 Hip pain Fall 88 male

## 2018-10-12 NOTE — IP AVS SNAPSHOT
[facility]    [address] [place] [address], [address]    [place] [address]    Phone:  [phone]                                       After Visit Summary   10/12/2018    Salo Willis    MRN: [id number]           After Visit Summary Signature Page     I have received my discharge instructions, and my questions have been answered. I have discussed any challenges I see with this plan with the nurse or doctor.    ..........................................................................................................................................  Patient/Patient Representative Signature      ..........................................................................................................................................  Patient Representative Print Name and Relationship to Patient    ..................................................               ................................................  Date                                   Time    ..........................................................................................................................................  Reviewed by Signature/Title    ...................................................              ..............................................  Date                                               Time          22EPIC Rev 08/18

## 2018-10-12 NOTE — ED PROVIDER NOTES
"  History     Chief Complaint:  Hip Pain    HPI   HPI limited secondary to patient's demetia. HPI provided through nurse's report.     Salo Willis is a 88 year old male, with a history of a-fib, CHF, s/p ICD, and on Plavix, who presents to the ED for evaluation of right hip pain. Per nurse's note, he had a fall 2 days ago and had a x-ray done at his nursing home. He has been bed ridden since and been receiving half a tab of Oxycodone for pain. His last dose was at 7:15AM. He is noted to be more lethargic per staff today.     Allergies:  Niacin     Medications:    Tylenol  Aspirin 81mg  Lipitor  Coreg  Vitamin D  Plavix   Docusate  Lasix  Lisinopril   Remeron  Potassium chloride  Zantac  Exelon  Trazodone     Past Medical History:    AAA  Prostate cancer  Inguinal hernia  GERD  CHF  CAD  Cardiomyopathy   A-fib  Paroxysmal VT  HLD    Past Surgical History:    TURP  CABG  AAA repair   ICD Implant   Right carotid endarterectomy     Family History:    History reviewed. No pertinent family history.     Social History:  Smoking status: Former smoker   Presents to ED with daughter    Marital Status:   [5]     Review of Systems   Unable to perform ROS: Dementia     Physical Exam     Patient Vitals for the past 24 hrs:   BP Temp Temp src Pulse Resp SpO2 Height Weight   10/12/18 2007 122/82 - - - - 96 % - -   10/12/18 1954 (!) 113/98 - - - - 96 % - -   10/12/18 1642 95/54 97.6  F (36.4  C) Oral 78 16 97 % 1.727 m (5' 8\") 63.5 kg (140 lb)   10/12/18 1641 107/50 - - - - 95 % - -     Physical Exam  General: Demented, appears frail and elderly. Cooperative.     In mild distress  HEENT:  Head:  Atraumatic  Ears:  External ears are normal  Mouth/Throat:  Oropharynx is without erythema or exudate and mucous membranes are dry.   Eyes:   Conjunctivae normal and EOM are normal. No scleral icterus.    Pupils are equal, round, and reactive to light.   CV:  Irregular rate, irregular rhythm, normal heart sounds and radial " pulses are 2+ and symmetric.  No murmur.  Resp:  Breath sounds are clear bilaterally    Non-labored, no retractions or accessory muscle use  GI:  Abdomen is soft, no distension, no tenderness. No rebound or guarding.  No CVA tenderness bilaterally  MS:  Right hip is tender to palpation of groin.  No significant shortening or rotation of right low leg compared to left.  left leg with full ROM and flexion/extension of knee.  Right hip tender with raising of leg.    No edema.    Back atraumatic.    No midline cervical, thoracic, or lumbar tenderness  Skin:  Warm and dry.  No rash or lesions noted.  Neuro:  Demented. Normal strength.  GCS: 15  Psych:  Unable to assess due to dementia    Emergency Department Course     ECG (20:05:36):  Rate 99 bpm. WY interval *. QRS duration 160. QT/QTc 400/513. P-R-T axes * -65 -62. Undetermined rhythm. Left axis deviation. Right bundle branch block. Inferior infarct, age undetermined. Abnormal ECG. Nonspecific ST segment changes lateral leads. No significant change compared to EKG dated 8/10/18. Interpreted at 2010 by Rodriguez Carpenter MD.    Imaging:  Radiographic findings were communicated with the family who voiced understanding of the findings.    CT Hip Right w/o Contrast  IMPRESSION: No fracture or other evidence of recent injury. As read by Radiology.     Head CT w/o Contrast  IMPRESSION:   1. Left parietal lobe infarct, likely chronic.  2. No evidence of intracranial hemorrhage.  As read by Radiology.     CT Chest/Abdomen/Pelvis w Contrast  IMPRESSION:   1. There is relative soft tissue prominence in the muscles posterior  to the right hip. This is suspicious for a hematoma.  2. There is a 3.2 cm calculus within the left renal pelvis with  numerous smaller calculi elsewhere in the kidney. There is prominent  left hydronephrosis which is likely chronic with left renal atrophy.  As read by Radiology.     XR Femur Right 2 Views  IMPRESSION: No fracture identified. As read by  Radiology.     Laboratory:  Glucose meter (1645): 146(H)    UA: Blood small, Protein albumin 10, Leukocyte esterase large, RBC 10(H), (H), Mucous present, Hyaline casts 13(H), o/w Negative     Urine culture: In process     Occult blood stool: Negative     Erythrocyte sed rate auto: 27(H)  CRP inflammation: 32.2(H)    Lactic acid (1936): 1.3  Troponin I #1 (1656): 0.017  Troponin I #2 (2005): 0.022  Nt Pro BNP: 1857(H)  INR (1656): 1.23(H)    CBC: WBC 11.8(H), HGB 8.5(L), (L)  BMP: Glucose 132(H), BUN 51(H), Calcium 8.0(L), o/w WNL (Creatinine 1.04)     Blood cultures x2: In process     Interventions:  1927: NS 500mLs Bolus IV  2145: Rocephin 2g IV    Emergency Department Course:  Patient arrived by EMS.     Past medical records, nursing notes, and vitals reviewed.  1642: I performed an exam of the patient and obtained history, as documented above.    IV inserted and blood drawn.    The patient was sent for a right hip CT while in the emergency department, findings above.    1654: I spoke with patient's daughter, Geno Willis, over the phone who is on her way to the ED.     1658: I spoke with patient's daughter who arrived in the ED.    1826: I rechecked the patient. Explained findings to daughter.    The patient was sent for a head CT, chest/abdomen/pelvis CT, and right femur x-ray while in the emergency department, findings above.    2013: I spoke with Dr. Jean of radiology.    2019: I spoke to Dr. Martínez of the hospitalist service who accepts the patient for admission.     2028: I rechecked the patient.    2051: I spoke with the patient's daughter over the phone, updating her on the results and admission.    Findings and plan explained to the daughter who consents to admission. Discussed the patient with Dr. Martínez, who will admit the patient to a [facility] bed for further monitoring, evaluation, and treatment.     Impression & Plan      Medical Decision Making:  Patient is a 88 year old  male with a history of dementia, coronary artery disease, CHF, and atrial fibrillation, who presents with weakness and right hip pain. Patient apparently had a fall several days ago at his nursing home and ultimately his care facility felt he was more lethargic today and sent him in for evaluation. Patient had a x-ray which apparently was negative at the nursing home. Here, patient has significant pain with flexion of the right hip. CT was obtained of the right hip although no evidence of acute fracture or dislocation. We did obtain a CT of the chest, abdomen, and pelvis as the patient is non-contributory to history and unable to tell whether his pain may be referred from intraabdominal bleed or potentially from a hematoma of the right hip. Fortunately, we were able to compare both sides, left hip with right hip, and found that there was a large hematoma to muscles surrounding the right hip. Patient is on Plavix with his history of coronary artery disease. I suspect this right hip hematoma occurred due to the fall on Wednesday. Patient does appear to also have a urinary tract infection with large leukocyte esterase and white blood cell count on a catheterized specimen in the setting of increasing lethargy. Ceftriaxone given in ED and UC in process prior to admission. Patient was given gentle fluid resuscitation with 500cc of normal saline due to CHF history. I spoke ultimately with Dr. Martínez who agreed to admission at this time.  I also spoke with son of the patient who is power of  and agreed to admission at this time.     Diagnosis:    ICD-10-CM   1. Hematoma of right hip, initial encounter S70.01XA   2. History of dementia Z86.59   3. Fall, initial encounter W19.XXXA     Disposition: Patient admitted to a [facility] bed by Dr. Tanya Anguiano  10/12/2018    EMERGENCY DEPARTMENT    Scribe Disclosure:  Kailyn MAZA, am serving as a scribe at 4:42 PM on 10/12/2018 to document services  personally performed by Rodriguez Carpenter MD based on my observations and the provider's statements to me.            Rodriguez Carpenter MD  10/12/18 [id number]

## 2018-10-12 NOTE — IP AVS SNAPSHOT
MRN:[id number]                      After Visit Summary   10/12/2018    Salo Willis    MRN: [id number]           Thank you!     Thank you for choosing [facility] for your care. Our goal is always to provide you with excellent care. Hearing back from our patients is one way we can continue to improve our services. Please take a few minutes to complete the written survey that you may receive in the mail after you visit with us. Thank you!        Patient Information     Date Of Birth          7/5/1930        Designated Caregiver       Most Recent Value    Caregiver    Will someone help with your care after discharge? no [pt lives in extended care facility]      About your hospital stay     You were admitted on:  October 12, 2018 You last received care in the:  [facility] [place] [facility]    You were discharged on:  October 18, 2018        Reason for your hospital stay       You were at [facility] due to fall with right leg hematoma.  Found to have elevated troponin (heart enzyme indicating stress).  Opted for medical management and discussion regarding goals of care occurred and swallow assessments.                  Who to Call     For medical emergencies, please call 911.  For non-urgent questions about your medical care, please call your primary care provider or clinic, None          Attending Provider     Provider Specialty    Rodriguez Carpenter MD Emergency Medicine    Tono Martínez,  HOSPITALIST       Primary Care Provider Fax #    [facility] [phone]      After Care Instructions     Activity       Your activity upon discharge: activity as tolerated            Diet       Follow this diet upon discharge:  -Snacks/Supplements Adult: Other; PLUS2 shake with meals (RD); With Meals  -Combination Diet Dysphagia Diet Level 2: Mechan Altered  -Thin Liquids (water, ice chips, juice, milk gelatin, ice cream, etc)  -Low Saturated Fat Na <2400mg  Diet, No Caffeine Diet; No Straws.  -Precautions/strategies include: sit at 90 degrees, remain upright for 60 minutes after eating, small bites/sips.            Discharge Instructions       Please administer medications with 8 oz of water.                  Follow-up Appointments     Follow-up and recommended labs and tests        Follow up with primary care provider, [facility], within 7 days to evaluate medication change and for hospital follow- up.  The following labs/tests are recommended: CBC and BMP.      Follow up with Cardiology per their recommendations.                  Your next 10 appointments already scheduled     Oct 23, 2018 10:00 AM CDT   LAB with [facility]   [facility] [place] ([facility])    [address] [place] [id number] [phone]           Please do not eat 10-12 hours before your appointment if you are coming in fasting for labs on lipids, cholesterol, or glucose (sugar). This does not apply to pregnant women. Water, hot tea and black coffee (with nothing added) are okay. Do not drink other fluids, diet soda or chew gum.            Oct 23, 2018 10:50 AM CDT   Core Return with Yanni Baker PA-C   [facility] ([facility])    [address] [place], [place] [id number]  [facility] [id number] [phone] OPT 2              Additional Services     Home Care OT Referral for Hospital Discharge       OT to eval and treat    Your provider has ordered home care - occupational therapy. If you have not been contacted within 2 days of your discharge please call the department phone number listed on the top of this document.            Home Care PT Referral for Hospital Discharge       PT to eval and treat    Your provider has ordered home care - physical therapy. If you have not been contacted within 2 days of your discharge please call the department phone number listed on the  top of this document.            Home Care SLP Referral for Hospital Discharge       SLP to eval and treat    Your provider has ordered home care - speech therapy. If you have not been contacted within 2 days of your discharge please call the department phone number listed on the top of this document.            Home care nursing referral       RN skilled nursing visit. RN to assess vital signs and weight, respiratory and cardiac status, pain level and activity tolerance, hydration, nutrition and bowel status and home safety.  RN to teach medication management.      Your provider has ordered home care nursing services. If you have not been contacted within 2 days of your discharge please call the inpatient department phone number at [phone] .                  Further instructions from your care team       A follow-up appointment was scheduled for you [see above].  It is very important to go to it--bring these papers and your insurance card with you.  At the visit, talk about your hospital stay.  Tell your doctor how you feel.  Show your new list of medications.  Your doctor will update records, make sure you are still doing OK, and decide if any tests or medication changes are needed.      Your doctor has ordered home care to help you after your hospital stay.  They will contact you regarding your first visit.  This service will be provided by [facility].  If you have not received a call within 48 hours of discharge, please call them at [phone].      Pending Results     Date and Time Order Name Status Description    10/12/2018 1903 Blood culture Preliminary     10/12/2018 1903 Blood culture Preliminary             Statement of Approval     Ordered          10/18/18 1134  I have reviewed and agree with all the recommendations and orders detailed in this document.  EFFECTIVE NOW     Approved and electronically signed by:  Jany Falcon MD             Admission Information     Date & Time  "Provider Department Dept. Phone    10/12/2018 Tono Martínez,  [facility] [phone]      Your Vitals Were     Blood Pressure Pulse Temperature Respirations Height Weight    106/48 (BP Location: Right arm) 77 98  F (36.7  C) (Oral) 16 1.727 m (5' 8\") 54.1 kg (119 lb 4.8 oz)    Pulse Oximetry BMI (Body Mass Index)                94% 18.14 kg/m2          Care EveryWhere ID     This is your Care EveryWhere ID. This could be used by other organizations to access your [facility] medical records  [id number]        Equal Access to Services     KENRICK JIM : Hubert Jacome, wafrankoda luqadaha, qaybsrikanth kaalmada kevin, liang harrell. So [facility] [phone].    ATENCIÓN: Si habla español, tiene a pham disposición servicios gratuitos de asistencia lingüística. Llame al [phone].    We comply with applicable federal civil rights laws and [place] laws. We do not discriminate on the basis of race, color, national origin, age, disability, sex, sexual orientation, or gender identity.               Review of your medicines      START taking        Dose / Directions    artificial saliva Liqd liquid   Used for:  History of dementia        Dose:  15 mL   Swish and spit 15 mLs in mouth 4 times daily   Quantity:  1000 mL   Refills:  0       ondansetron 4 MG ODT tab   Commonly known as:  ZOFRAN ODT   Used for:  Nausea        Dose:  8 mg   Take 2 tablets (8 mg) by mouth every 8 hours as needed for nausea   Quantity:  20 tablet   Refills:  0       polyethylene glycol Packet   Commonly known as:  MIRALAX/GLYCOLAX   Used for:  Constipation, unspecified constipation type        Dose:  17 g   Take 17 g by mouth daily   Quantity:  30 packet   Refills:  0       sucralfate 1 GM tablet   Commonly known as:  CARAFATE   Used for:  Dysphagia, unspecified type        Dose:  1 g   Take 1 tablet (1 g) by mouth 4 times daily   Quantity:  40 tablet   Refills:  1       "   CONTINUE these medicines which may have CHANGED, or have new prescriptions. If we are uncertain of the size of tablets/capsules you have at home, strength may be listed as something that might have changed.        Dose / Directions    furosemide 20 MG tablet   Commonly known as:  LASIX   This may have changed:    - when to take this  - reasons to take this   Used for:  Ischemic cardiomyopathy        Dose:  20 mg   Take 1 tablet (20 mg) by mouth daily as needed (Administer if daily weight increased greater than 3 pounds and/or 5 pounds in a week.)   Quantity:  90 tablet   Refills:  0       ranitidine 150 MG tablet   Commonly known as:  ZANTAC   This may have changed:  when to take this   Used for:  Gastroesophageal reflux disease without esophagitis        Dose:  150 mg   Take 1 tablet (150 mg) by mouth 2 times daily (before meals)   Quantity:  60 tablet   Refills:  0         CONTINUE these medicines which have NOT CHANGED        Dose / Directions    acetaminophen 500 MG tablet   Commonly known as:  TYLENOL        Dose:  1000 mg   Take 1,000 mg by mouth 3 times daily as needed for pain   Refills:  0       aspirin 81 MG EC tablet   Used for:  S/P CABG (coronary artery bypass graft)        Dose:  81 mg   Take 1 tablet (81 mg) by mouth daily   Quantity:  90 tablet   Refills:  3       atorvastatin 10 MG tablet   Commonly known as:  LIPITOR        Dose:  10 mg   Take 10 mg by mouth daily   Refills:  0       carvedilol 6.25 MG tablet   Commonly known as:  COREG        Dose:  3.125 mg   Take 3.125 mg by mouth 2 times daily 6.25 mg x 0.5 tabs= 3.125mg   Refills:  0       cholecalciferol 1000 units capsule   Commonly known as:  vitamin  -D        Dose:  2 capsule   Take 2 capsules by mouth daily   Refills:  0       lisinopril 2.5 MG tablet   Commonly known as:  PRINIVIL/Zestril   Used for:  S/P CABG (coronary artery bypass graft), Chronic atrial fibrillation (H)        Dose:  2.5 mg   Take 1 tablet (2.5 mg) by mouth daily    Quantity:  90 tablet   Refills:  3       mirtazapine 7.5 mg Tabs   Commonly known as:  REMERON        Dose:  7.5 mg   Take 7.5 mg by mouth At Bedtime   Refills:  0       oxyCODONE HCl 5 MG Taba   Commonly known as:  OXAYDO        Dose:  2.5 mg   Take 2.5 mg by mouth every 4 hours as needed (pain) 5mg tab x 0.5 tab = 2.5mg   Refills:  0       potassium chloride SA 10 MEQ CR tablet   Commonly known as:  K-DUR/KLOR-CON M        Dose:  10 mEq   Take 10 mEq by mouth every evening   Refills:  0       rivastigmine 1.5 MG capsule   Commonly known as:  EXELON        Dose:  1.5 mg   Take 1.5 mg by mouth 2 times daily (with meals)   Refills:  0       STOOL SOFTENER 100 MG tablet   Generic drug:  docusate sodium        Dose:  100 mg   Take 100 mg by mouth every evening   Refills:  0       traZODone 50 MG tablet   Commonly known as:  DESYREL        Dose:  50 mg   Take 50 mg by mouth At Bedtime   Refills:  0         STOP taking     clopidogrel 75 MG tablet   Commonly known as:  PLAVIX                Where to get your medicines      These medications were sent to [facility] [address] [address]     Phone:  [phone]     artificial saliva Liqd liquid    furosemide 20 MG tablet    ondansetron 4 MG ODT tab    polyethylene glycol Packet    ranitidine 150 MG tablet    sucralfate 1 GM tablet                Protect others around you: Learn how to safely use, store and throw away your medicines at www.disposemymeds.org.        Information about OPIOIDS     PRESCRIPTION OPIOIDS: WHAT YOU NEED TO KNOW   We gave you an opioid (narcotic) pain medicine. It is important to manage your pain, but opioids are not always the best choice. You should first try all the other options your care team gave you. Take this medicine for as short a time (and as few doses) as possible.    Some activities can increase your pain, such as bandage changes or therapy  sessions. It may help to take your pain medicine 30 to 60 minutes before these activities. Reduce your stress by getting enough sleep, working on hobbies you enjoy and practicing relaxation or meditation. Talk to your care team about ways to manage your pain beyond prescription opioids.    These medicines have risks:    DO NOT drive when on new or higher doses of pain medicine. These medicines can affect your alertness and reaction times, and you could be arrested for driving under the influence (DUI). If you need to use opioids long-term, talk to your care team about driving.    DO NOT operate heavy machinery    DO NOT do any other dangerous activities while taking these medicines.    DO NOT drink any alcohol while taking these medicines.     If the opioid prescribed includes acetaminophen, DO NOT take with any other medicines that contain acetaminophen. Read all labels carefully. Look for the word  acetaminophen  or  Tylenol.  Ask your pharmacist if you have questions or are unsure.    You can get addicted to pain medicines, especially if you have a history of addiction (chemical, alcohol or substance dependence). Talk to your care team about ways to reduce this risk.    All opioids tend to cause constipation. Drink plenty of water and eat foods that have a lot of fiber, such as fruits, vegetables, prune juice, apple juice and high-fiber cereal. Take a laxative (Miralax, milk of magnesia, Colace, Senna) if you don t move your bowels at least every other day. Other side effects include upset stomach, sleepiness, dizziness, throwing up, tolerance (needing more of the medicine to have the same effect), physical dependence and slowed breathing.    Store your pills in a secure place, locked if possible. We will not replace any lost or stolen medicine. If you don t finish your medicine, please throw away (dispose) as directed by your pharmacist. The [place] [employer] has more information about safe  disposal: https://www.pca.[facility].[place].us/living-green/managing-unwanted-medications             Medication List: This is a list of all your medications and when to take them. Check marks below indicate your daily home schedule. Keep this list as a reference.      Medications           Morning Afternoon Evening Bedtime As Needed    acetaminophen 500 MG tablet   Commonly known as:  TYLENOL   Take 1,000 mg by mouth 3 times daily as needed for pain   Last time this was given:  1,000 mg on 10/15/2018  6:30 PM                                   artificial saliva Liqd liquid   Swish and spit 15 mLs in mouth 4 times daily   Last time this was given:  15 mLs on 10/18/2018 12:57 PM                    10/18/18       10/18/18           aspirin 81 MG EC tablet   Take 1 tablet (81 mg) by mouth daily   Last time this was given:  81 mg on 10/18/2018  9:34 AM            10/19/18                       atorvastatin 10 MG tablet   Commonly known as:  LIPITOR   Take 10 mg by mouth daily   Last time this was given:  10 mg on 10/18/2018  9:33 AM            10/19/18                       carvedilol 6.25 MG tablet   Commonly known as:  COREG   Take 3.125 mg by mouth 2 times daily 6.25 mg x 0.5 tabs= 3.125mg   Last time this was given:  3.125 mg on 10/18/2018  9:33 AM                    10/18/18               cholecalciferol 1000 units capsule   Commonly known as:  vitamin  -D   Take 2 capsules by mouth daily                    10/18/18               furosemide 20 MG tablet   Commonly known as:  LASIX   Take 1 tablet (20 mg) by mouth daily as needed (Administer if daily weight increased greater than 3 pounds and/or 5 pounds in a week.)   Last time this was given:  20 mg on 10/13/2018 10:47 AM                                   lisinopril 2.5 MG tablet   Commonly known as:  PRINIVIL/Zestril   Take 1 tablet (2.5 mg) by mouth daily   Last time this was given:  2.5 mg on 10/18/2018  9:33 AM            10/19/18                       mirtazapine 7.5 mg  Tabs   Commonly known as:  REMERON   Take 7.5 mg by mouth At Bedtime   Last time this was given:  7.5 mg on 10/17/2018  8:36 PM                        10/18/18           ondansetron 4 MG ODT tab   Commonly known as:  ZOFRAN ODT   Take 2 tablets (8 mg) by mouth every 8 hours as needed for nausea   Last time this was given:  4 mg on 10/14/2018  8:25 PM                                   oxyCODONE HCl 5 MG Taba   Commonly known as:  OXAYDO   Take 2.5 mg by mouth every 4 hours as needed (pain) 5mg tab x 0.5 tab = 2.5mg                                   polyethylene glycol Packet   Commonly known as:  MIRALAX/GLYCOLAX   Take 17 g by mouth daily   Last time this was given:  17 g on 10/18/2018  9:34 AM            10/19/18                       potassium chloride SA 10 MEQ CR tablet   Commonly known as:  K-DUR/KLOR-CON M   Take 10 mEq by mouth every evening                    10/19/18               ranitidine 150 MG tablet   Commonly known as:  ZANTAC   Take 1 tablet (150 mg) by mouth 2 times daily (before meals)   Last time this was given:  150 mg on 10/18/2018  9:33 AM                    10/19/18               rivastigmine 1.5 MG capsule   Commonly known as:  EXELON   Take 1.5 mg by mouth 2 times daily (with meals)   Last time this was given:  1.5 mg on 10/18/2018  9:34 AM                    10/19/18               STOOL SOFTENER 100 MG tablet   Take 100 mg by mouth every evening   Generic drug:  docusate sodium                    10/18/18               sucralfate 1 GM tablet   Commonly known as:  CARAFATE   Take 1 tablet (1 g) by mouth 4 times daily   Last time this was given:  10/18/18 1pm                    10/18/18       10/18/18           traZODone 50 MG tablet   Commonly known as:  DESYREL   Take 50 mg by mouth At Bedtime   Last time this was given:  50 mg on 10/17/2018  8:36 PM                        10/18/19

## 2018-10-12 NOTE — IP AVS SNAPSHOT
"[facility] CARDIAC SPECIALTY CARE: [phone]                                              INTERAGENCY TRANSFER FORM - PHYSICIAN ORDERS   10/12/2018                    Hospital Admission Date: 10/12/2018  CARI HICKMAN   : 1930  Sex: Male        Attending Provider: Tono Martínez DO     Allergies:  Niacin    Infection:  None   Service:  HOSPITALIST    Ht:  1.727 m (5' 8\")   Wt:  54 kg (119 lb)   Admission Wt:  63.5 kg (140 lb)    BMI:  18.09 kg/m 2   BSA:  1.61 m 2            Patient PCP Information     Provider PCP Type    [facility]      ED Clinical Impression     Diagnosis Description Comment Added By Time Added    Hematoma of right hip, initial encounter [S70.01XA] Hematoma of right hip, initial encounter [S70.01XA]  Rodriguez Carpenter MD 10/12/2018  8:18 PM    History of dementia [Z86.59] History of dementia [Z86.59]  Rodriguez Carpenter MD 10/12/2018  8:18 PM    Fall, initial encounter [W19.XXXA] Fall, initial encounter [W19.XXXA]  Rodriguez Carpenter MD 10/12/2018  8:18 PM      Hospital Problems as of 10/17/2018              Priority Class Noted POA    Hematoma Medium  10/12/2018 Yes      Non-Hospital Problems as of 10/17/2018              Priority Class Noted    S/P TURP Medium  Unknown    S/P CABG (coronary artery bypass graft) Medium  Unknown    S/P AAA repair Medium  Unknown    Prostate cancer (H) Medium  Unknown    Inguinal hernia Medium  Unknown    Hyperlipidemia Medium  Unknown    GERD (gastroesophageal reflux disease) Medium  Unknown    CHF (congestive heart failure) (H) Medium  Unknown    Carotid artery disease (H) Medium  Unknown    Cardiomyopathy (H) Medium  Unknown    CAD (coronary artery disease) Medium  Unknown    Atrial fibrillation (H) Medium  Unknown    AICD (automatic cardioverter/defibrillator) present Medium  Unknown    Abdominal aortic aneurysm (H) Medium  Unknown    Paroxysmal VT (H) Medium  Unknown      Code Status History     Date Active " Date Inactive Code Status Order ID Comments User Context    10/17/2018  3:23 PM  DNR/DNI [id number]  Narendra Polanco PA-C Outpatient    10/14/2018 10:39 PM 10/17/2018  3:23 PM DNR/DNI [id number] Code change per Geno Ty RN Inpatient    10/12/2018 10:33 PM 10/14/2018 10:39 PM Full Code [id number] Discussed with son Kenny who is the POA per his report Tono Martínez, DO Inpatient         Medication Review      START taking        Dose / Directions Comments    artificial saliva Liqd liquid   Used for:  History of dementia        Dose:  15 mL   Swish and spit 15 mLs in mouth 4 times daily   Quantity:  1000 mL   Refills:  0    Future refills by PCP [facility] with phone number None.       ondansetron 4 MG ODT tab   Commonly known as:  ZOFRAN ODT   Used for:  Nausea        Dose:  8 mg   Take 2 tablets (8 mg) by mouth every 8 hours as needed for nausea   Quantity:  20 tablet   Refills:  0    Future refills by PCP [facility] with phone number None.       polyethylene glycol Packet   Commonly known as:  MIRALAX/GLYCOLAX   Used for:  Constipation, unspecified constipation type        Dose:  17 g   Start taking on:  10/18/2018   Take 17 g by mouth daily   Quantity:  30 packet   Refills:  0    Future refills by PCP [facility] with phone number None.         CONTINUE these medications which may have CHANGED, or have new prescriptions. If we are uncertain of the size of tablets/capsules you have at home, strength may be listed as something that might have changed.        Dose / Directions Comments    furosemide 20 MG tablet   Commonly known as:  LASIX   This may have changed:    - when to take this  - reasons to take this   Used for:  Ischemic cardiomyopathy        Dose:  20 mg   Take 1 tablet (20 mg) by mouth daily as needed (Administer if daily weight increased greater than 3 pounds and/or 5 pounds in a week.)   Quantity:  90  tablet   Refills:  0    Future refills by PCP [facility] with phone number None.       ranitidine 150 MG tablet   Commonly known as:  ZANTAC   This may have changed:  when to take this   Used for:  Gastroesophageal reflux disease without esophagitis        Dose:  150 mg   Take 1 tablet (150 mg) by mouth 2 times daily (before meals)   Quantity:  60 tablet   Refills:  0    Future refills by PCP [facility] with phone number None.         CONTINUE these medications which have NOT CHANGED        Dose / Directions Comments    acetaminophen 500 MG tablet   Commonly known as:  TYLENOL        Dose:  1000 mg   Take 1,000 mg by mouth 3 times daily as needed for pain   Refills:  0        aspirin 81 MG EC tablet   Used for:  S/P CABG (coronary artery bypass graft)        Dose:  81 mg   Take 1 tablet (81 mg) by mouth daily   Quantity:  90 tablet   Refills:  3        atorvastatin 10 MG tablet   Commonly known as:  LIPITOR        Dose:  10 mg   Take 10 mg by mouth daily   Refills:  0        carvedilol 6.25 MG tablet   Commonly known as:  COREG        Dose:  3.125 mg   Take 3.125 mg by mouth 2 times daily 6.25 mg x 0.5 tabs= 3.125mg   Refills:  0        cholecalciferol 1000 units capsule   Commonly known as:  vitamin  -D        Dose:  2 capsule   Take 2 capsules by mouth daily   Refills:  0        lisinopril 2.5 MG tablet   Commonly known as:  PRINIVIL/Zestril   Used for:  S/P CABG (coronary artery bypass graft), Chronic atrial fibrillation (H)        Dose:  2.5 mg   Take 1 tablet (2.5 mg) by mouth daily   Quantity:  90 tablet   Refills:  3        mirtazapine 7.5 mg Tabs   Commonly known as:  REMERON        Dose:  7.5 mg   Take 7.5 mg by mouth At Bedtime   Refills:  0        oxyCODONE HCl 5 MG Taba   Commonly known as:  OXAYDO        Dose:  2.5 mg   Take 2.5 mg by mouth every 4 hours as needed (pain) 5mg tab x 0.5 tab = 2.5mg   Refills:  0        potassium chloride SA 10 MEQ CR tablet    Commonly known as:  K-DUR/KLOR-CON M        Dose:  10 mEq   Take 10 mEq by mouth every evening   Refills:  0        rivastigmine 1.5 MG capsule   Commonly known as:  EXELON        Dose:  1.5 mg   Take 1.5 mg by mouth 2 times daily (with meals)   Refills:  0        STOOL SOFTENER 100 MG tablet   Generic drug:  docusate sodium        Dose:  100 mg   Take 100 mg by mouth every evening   Refills:  0        traZODone 50 MG tablet   Commonly known as:  DESYREL        Dose:  50 mg   Take 50 mg by mouth At Bedtime   Refills:  0          STOP taking     clopidogrel 75 MG tablet   Commonly known as:  PLAVIX                     Further instructions from your care team       A follow-up appointment was scheduled for you [see above].  It is very important to go to it--bring these papers and your insurance card with you.  At the visit, talk about your hospital stay.  Tell your doctor how you feel.  Show your new list of medications.  Your doctor will update records, make sure you are still doing OK, and decide if any tests or medication changes are needed.      Your doctor has ordered home care to help you after your hospital stay.  They will contact you regarding your first visit.  This service will be provided by [facility].  If you have not received a call within 48 hours of discharge, please call them at [phone].      Summary of Visit     Reason for your hospital stay       You were at [facility] due to fall with right leg hematoma.  Found to have elevated troponin (heart enzyme indicating stress).  Opted for medical management and discussion regarding goals of care occurred and swallow assessments.             After Care     Activity       Your activity upon discharge: activity as tolerated       Diet       Follow this diet upon discharge: Orders Placed This Encounter    Snacks/Supplements Adult: Other; PLUS2 shake with meals (RD); With Meals        Combination Diet Dysphagia Diet Level 2: Mechan  Altered  Thin Liquids (water, ice chips, juice, milk gelatin, ice cream, etc)  Low Saturated Fat Na <2400mg Diet, No Caffeine Diet; No Straws             Referrals     Home Care OT Referral for Hospital Discharge       OT to al and treat    Your provider has ordered home care - occupational therapy. If you have not been contacted within 2 days of your discharge please call the department phone number listed on the top of this document.       Home Care PT Referral for Hospital Discharge       PT to eval and treat    Your provider has ordered home care - physical therapy. If you have not been contacted within 2 days of your discharge please call the department phone number listed on the top of this document.       Home Care SLP Referral for Hospital Discharge       SLP to [facility] and treat    Your provider has ordered home care - speech therapy. If you have not been contacted within 2 days of your discharge please call the department phone number listed on the top of this document.       Home care nursing referral       RN skilled nursing visit. RN to assess vital signs and weight, respiratory and cardiac status, pain level and activity tolerance, hydration, nutrition and bowel status and home safety.  RN to teach medication management.      Your provider has ordered home care nursing services. If you have not been contacted within 2 days of your discharge please call the inpatient department phone number at [phone] .              MD face to face encounter       Documentation of Face to Face and Certification for Home Health Services    I certify that patient: Salo Willis is under my care and that I, or a nurse practitioner or physician's assistant working with me, had a face-to-face encounter that meets the physician face-to-face encounter requirements with this patient on: October 17, 2018.    This encounter with the patient was in whole, or in part, for the following medical condition, which is the  primary reason for home health care: Mechanical fall resulting in right lower extremity hematoma and noted NSTEMI.    I certify that, based on my findings, the following services are medically necessary home health services: Nursing, Occupational Therapy, Physical Therapy and Speech Language Therapy.    My clinical findings support the need for the above services because: Nurse is needed: To provide assessment and oversight required in the home to assure adherence to the medical plan due to: Dementia., Occupational Therapy Services are needed to assess and treat cognitive ability and address ADL safety due to impairment in Dementia., Physical Therapy Services are needed to assess and treat the following functional impairments: physical deconditioning due to mechanical fall with right nestor hematoma and deconditioning. and Speech Therapy Services are needed to assess and treat impairments in language and/or swallow functions due to impaired swallowing due to dementia.    Further, I certify that my clinical findings support that this patient is homebound (i.e. absences from home require considerable and taxing effort and are for medical reasons or [facility] or infrequently or of short duration when for other reasons) because: Mental health symptoms including: Patient gets lost or wanders to due to cognitive impairments.. and Requires assistance of another person or specialized equipment to access medical services because patient: Is prone to wander/get lost without assistance...    Based on the above findings. I certify that this patient is confined to the home and needs intermittent skilled nursing care, physical therapy and/or speech therapy.  The patient is under my care, and I have initiated the establishment of the plan of care.  This patient will be followed by a physician who will periodically review the plan of care.  Physician/Provider to provide follow up care: Danica Lomax  Physician    Attending hospital physician (the Medicare certified [place] [facility] provider): Dr. Falcon.  Physician Signature: See electronic signature associated with these discharge orders.  Date: 10/17/2018                  Your next 10 appointments already scheduled     Oct 23, 2018 10:00 AM CDT   LAB with [facility]   [facility] [place] ([facility])    [id number]  [facility] [address]   [phone]           Please do not eat 10-12 hours before your appointment if you are coming in fasting for labs on lipids, cholesterol, or glucose (sugar). This does not apply to pregnant women. Water, hot tea and black coffee (with nothing added) are okay. Do not drink other fluids, diet soda or chew gum.            Oct 23, 2018 10:50 AM CDT   Core Return with Yanni Baker PA-C   [facility] ([facility])    [address]  [facility] [address]   [phone] OPT 2              Follow-Up Appointment Instructions     Future Labs/Procedures    Follow-up and recommended labs and tests      Comments:    Follow up with primary care provider, [facility], within 7 days to evaluate medication change and for hospital follow- up.  The following labs/tests are recommended: CBC and BMP.      Follow up with Cardiology per their recommendations.      Follow-Up Appointment Instructions     Follow-up and recommended labs and tests        Follow up with primary care provider, [facility], within 7 days to evaluate medication change and for hospital follow- up.  The following labs/tests are recommended: CBC and BMP.      Follow up with Cardiology per their recommendations.

## 2018-10-13 NOTE — PLAN OF CARE
Problem: Patient Care Overview  Goal: Plan of Care/Patient Progress Review  OT and PT: Awaiting cardiology consult prior to initiating evals.

## 2018-10-13 NOTE — ED NOTES
"[facility]  ED Nurse Handoff Report    ED Chief complaint: Hip Pain (Pt had fall on wednesday and c/o rt hip pain. Did xray at NH neg for break. Pt has been in bed since and receiving oxycodone for pain last at 0715. Staff at NH felt pt was more lethargic today. hx dementia )      ED Diagnosis:   Final diagnoses:   Hematoma of right hip, initial encounter   History of dementia   Fall, initial encounter       Code Status: Per hospitalist    Allergies:   Allergies   Allergen Reactions     Niacin        Activity level - Baseline/Home:  Total Care    Activity Level - Current:   Total Care     Needed?: No    Isolation: No  Infection: Not Applicable  Bariatric?: No    Vital Signs:   Vitals:    10/12/18 1641 10/12/18 1642 10/12/18 1954 10/12/18 2007   BP: 107/50 95/54 (!) 113/98 122/82   Pulse:  78     Resp:  16     Temp:  97.6  F (36.4  C)     TempSrc:  Oral     SpO2: 95% 97% 96% 96%   Weight:  63.5 kg (140 lb)     Height:  1.727 m (5' 8\")         Cardiac Rhythm: ,        Pain level:      Is this patient confused?: Yes   [place] - Suicide Severity Rating Scale Completed?  Yes  If yes, what color did the patient score?  White    Patient Report: Initial Complaint: Salo Willis is a 88 year old male, with a history of a-fib, CHF, s/p ICD, and on Plavix, who presents to the ED for evaluation of right hip pain. Per nurse's note, he had a fall 2 days ago and had a x-ray done at his nursing home. He has been bed ridden since and been receiving half a tab of Oxycodone for pain. His last dose was at 7:15AM. He is noted to be more lethargic per NH staff today.   Focused Assessment: Resp: WNL Cardiac:WNL Neuro: Pt has hx of dementia. Per family pt is much more lethargic than he normally is. Musculo: Unable to bear weight since fall. C/o pain with movement of hips. GI/: Incontinent.   Tests Performed: basic labs, xrays and cts. Lactic, blood cultures, ua, trop, inr, sed rate and crp  Abnormal " Results:   Results for orders placed or performed during the hospital encounter of 10/12/18 (from the past 24 hour(s))   Glucose by meter   Result Value Ref Range    Glucose 146 (H) 70 - 99 mg/dL   CBC with platelets differential   Result Value Ref Range    WBC 11.8 (H) 4.0 - 11.0 10e9/L    RBC Count 2.63 (L) 4.4 - 5.9 10e12/L    Hemoglobin 8.5 (L) 13.3 - 17.7 g/dL    Hematocrit 25.5 (L) 40.0 - 53.0 %    MCV 97 78 - 100 fl    MCH 32.3 26.5 - 33.0 pg    MCHC 33.3 31.5 - 36.5 g/dL    RDW 13.5 10.0 - 15.0 %    Platelet Count 140 (L) 150 - 450 10e9/L    Diff Method Automated Method     % Neutrophils 71.2 %    % Lymphocytes 15.1 %    % Monocytes 10.5 %    % Eosinophils 2.6 %    % Basophils 0.2 %    % Immature Granulocytes 0.4 %    Nucleated RBCs 0 0 /100    Absolute Neutrophil 8.4 (H) 1.6 - 8.3 10e9/L    Absolute Lymphocytes 1.8 0.8 - 5.3 10e9/L    Absolute Monocytes 1.2 0.0 - 1.3 10e9/L    Absolute Eosinophils 0.3 0.0 - 0.7 10e9/L    Absolute Basophils 0.0 0.0 - 0.2 10e9/L    Abs Immature Granulocytes 0.1 0 - 0.4 10e9/L    Absolute Nucleated RBC 0.0    Basic metabolic panel   Result Value Ref Range    Sodium 141 133 - 144 mmol/L    Potassium 4.4 3.4 - 5.3 mmol/L    Chloride 109 94 - 109 mmol/L    Carbon Dioxide 26 20 - 32 mmol/L    Anion Gap 6 3 - 14 mmol/L    Glucose 132 (H) 70 - 99 mg/dL    Urea Nitrogen 51 (H) 7 - 30 mg/dL    Creatinine 1.04 0.66 - 1.25 mg/dL    GFR Estimate 67 >60 mL/min/1.7m2    GFR Estimate If Black 82 >60 mL/min/1.7m2    Calcium 8.0 (L) 8.5 - 10.1 mg/dL   CRP inflammation   Result Value Ref Range    CRP Inflammation 32.2 (H) 0.0 - 8.0 mg/L   Erythrocyte sedimentation rate auto   Result Value Ref Range    Sed Rate 27 (H) 0 - 20 mm/h   Troponin I   Result Value Ref Range    Troponin I ES 0.017 0.000 - 0.045 ug/L   INR   Result Value Ref Range    INR 1.23 (H) 0.86 - 1.14   CT Hip Right w/o Contrast    Narrative    CT RIGHT HIP WITHOUT CONTRAST   10/12/2018 5:49 PM    HISTORY: Right hip pain after  fall on Wednesday.    COMPARISON: None.    TECHNIQUE: Routine CT imaging is performed through the right hip  without contrast. Radiation dose for this scan was reduced using  automated exposure control, adjustment of the mA and/or kV according  to patient size, or iterative reconstruction technique.     FINDINGS: No fracture or osseous lesion is seen. There may be mild  joint space loss throughout the hip joint. There is prominent  calcification in the vascular structures. There is calcification  within the prostate gland. There are scattered diverticula of the  visualized colon with no evidence of diverticulitis. No other soft  tissue abnormality is seen.      Impression    IMPRESSION: No fracture or other evidence of recent injury.     TANIKA ABREU MD   Occult blood stool   Result Value Ref Range    Occult Blood Negative NEG^Negative   Lactic acid   Result Value Ref Range    Lactic Acid 1.3 0.4 - 2.0 mmol/L   XR Femur Right 2 Views    Narrative    RIGHT FEMUR TWO VIEWS  10/12/2018 7:28 PM     HISTORY: Pain. Unwitnessed fall.    COMPARISON: None.    FINDINGS: No fracture or dislocation is identified. There are  degenerative changes at the hip and knee joint. Severe vascular  calcifications are present. Clips are present throughout the leg,  likely related to prior vascular surgery.      Impression    IMPRESSION: No fracture identified.    ACE SOLORIO MD   CT Chest/Abdomen/Pelvis w Contrast    Narrative    CT CHEST, ABDOMEN, AND PELVIS WITH CONTRAST  10/12/2018 7:57 PM    HISTORY: Fall, dementia, unclear source of pain and/or potential  bleeding. Unwitnessed fall. Anemic today. Hemoglobin 8.5.    COMPARISON: None.    TECHNIQUE: Routine transverse CT imaging of the chest, abdomen, and  pelvis was performed following the uneventful administration of 61mL  Isovue-370 intravenous contrast. Radiation dose for this scan was  reduced using automated exposure control, adjustment of the mA and/or  kV according to  patient size, or iterative reconstruction technique.    FINDINGS:     Chest: The heart size is moderately enlarged. There are surgical  changes of a median sternotomy and presumed coronary bypass surgery.  No enlarged lymph node or other abnormal mediastinal mass is seen.  There is calcification within the thoracic aorta. No other vascular  abnormality is seen. There is mild atelectasis in both lung bases. No  pneumothorax is demonstrated. No pleural effusion is identified. There  are degenerative changes in the spine. No other osseous abnormality is  seen. No chest wall pathology is seen.     Abdomen and pelvis: The liver is unremarkable. There is an  approximately 2 cm cyst within the spleen. The pancreas, gallbladder,  and left adrenal gland are unremarkable. The right adrenal gland is  not seen but there is extensive surgical metal artifact in this  region. There is a 2.0 cm cyst of the lateral aspect of the right  kidney with a 1.0 cm cyst in the inferior pole. There is a 3.2 cm  calculus within the left renal pelvis with numerous additional smaller  calculi elsewhere within the collecting system of the left kidney. The  left kidney is moderately atrophic with prominent likely chronic  hydronephrosis. No other urinary tract abnormality is seen. No  enlarged lymph node or other abnormal mass is demonstrated. No free  fluid is seen. No free intraperitoneal gas is identified. The  gastrointestinal tract is unremarkable. The appendix is not  identified. There is no additional evidence of appendicitis. There is  enlargement of the prostate gland. There is an endovascular  aortobiiliac stent. There is calcification elsewhere in the vascular  structures. There are degenerative changes in the spine. No other  osseous abnormality is seen. There is soft tissue fullness posterior  to the right hip. This includes what appears to be an approximately 7  cm round area of more increased density within the posterior  thigh  musculature. No other adjacent soft tissue abnormality is seen.       Impression    IMPRESSION:   1. There is relative soft tissue prominence in the muscles posterior  to the right hip. This is suspicious for a hematoma.  2. There is a 3.2 cm calculus within the left renal pelvis with  numerous smaller calculi elsewhere in the kidney. There is prominent  left hydronephrosis which is likely chronic with left renal atrophy.    TANIKA ABREU MD   Head CT w/o contrast    Narrative    CT SCAN OF THE HEAD WITHOUT CONTRAST   10/12/2018 7:58 PM     HISTORY: Fall. AMS.    TECHNIQUE: Axial images of the head and coronal reformations without  IV contrast material. Radiation dose for this scan was reduced using  automated exposure control, adjustment of the mA and/or kV according  to patient size, or iterative reconstruction technique.    COMPARISON: None.    FINDINGS: Moderate volume loss is present. Patchy white matter  hypoattenuation likely represents chronic small vessel ischemic  change. There is geographic hypoattenuation within the left parietal  lobe with lack of gray-white differentiation and likely associated  cortical volume loss. No evidence of intracranial hemorrhage. The  visualized calvarium, skull base, paranasal sinuses, and extracranial  soft tissues are unremarkable. Bilateral lens replacements are  present.      Impression    IMPRESSION:   1. Left parietal lobe infarct, likely chronic.  2. No evidence of intracranial hemorrhage.    ACE SOLORIO MD   Troponin I (now)   Result Value Ref Range    Troponin I ES 0.022 0.000 - 0.045 ug/L   EKG 12 lead   Result Value Ref Range    Interpretation ECG Click View Image link to view waveform and result    UA with Microscopic   Result Value Ref Range    Color Urine Yellow     Appearance Urine Slightly Cloudy     Glucose Urine Negative NEG^Negative mg/dL    Bilirubin Urine Negative NEG^Negative    Ketones Urine Negative NEG^Negative mg/dL    Specific  Gravity Urine 1.011 1.003 - 1.035    Blood Urine Small (A) NEG^Negative    pH Urine 5.0 5.0 - 7.0 pH    Protein Albumin Urine 10 (A) NEG^Negative mg/dL    Urobilinogen mg/dL Normal 0.0 - 2.0 mg/dL    Nitrite Urine Negative NEG^Negative    Leukocyte Esterase Urine Large (A) NEG^Negative    Source Midstream Urine     WBC Urine 153 (H) 0 - 5 /HPF    RBC Urine 10 (H) 0 - 2 /HPF    Mucous Urine Present (A) NEG^Negative /LPF    Hyaline Casts 13 (H) 0 - 2 /LPF       Treatments provided: fluids, antibiotics    Family Comments: none    OBS brochure/video discussed/provided to patient/family: N/A              Name of person given brochure if not patient: na              Relationship to patient: na    ED Medications:   Medications   cefTRIAXone (ROCEPHIN) 2 g vial to attach to  ml bag for ADULTS or NS 50 ml bag for PEDS (not administered)   0.9% sodium chloride BOLUS (500 mLs Intravenous New Bag 10/12/18 1927)   iopamidol (ISOVUE-370) solution 71 mL (71 mLs Intravenous Given 10/12/18 1949)   Saline (61 mLs As instructed Given 10/12/18 1949)       Drips infusing?:  Yes    For the majority of the shift this patient was Green.   Interventions performed were none.    Severe Sepsis OR Septic Shock Diagnosis Present: No    To be done/followed up on inpatient unit:  none    ED NURSE PHONE NUMBER: [phone]

## 2018-10-13 NOTE — PROVIDER NOTIFICATION
"MD Notification    Notified Person: MD    Notified Person Name:  Dr. Hensley (cards) Ashley (hospitalist)    Notification Date/Time:  10/13, 1630    Notification Interaction: direct conversation    Purpose of Notification:  Pt c/o chest burning, nausea, weakness, pain in chest and generalized, feels \"miserable\" and \"just wants us to let him go\".  Pt hgb recheck 7.8.    Orders Received:  Blood transfusion, consent done, both MDs aware, will complete and continue to monitor.     Comments:      "

## 2018-10-13 NOTE — PROGRESS NOTES
RECEIVING UNIT ED HANDOFF REVIEW    ED Nurse Handoff Report was reviewed by: Geno Houser on October 12, 2018 at 10:20 PM

## 2018-10-13 NOTE — PROGRESS NOTES
[facility]  Hospitalist Progress Note  Mike Ramirez MD 10/13/2018    Mr Willis is an 88 years old man with history of IHD, ischemic CMP, atrial fibrillation, PVD, dementia, presenting with pain in the right hip, likely post traumatic , unwitnessed fall. Found to have right hip hematoma and anemia. Admitted for inpatient treatment 10/12/18         Assessment and Plan:      Right hip hematoma  Post traumatic in the setting of falls  No fracture on the XRs's and CT   Plan  - serial hemoglobins  - if drops < 7 consider blood transfusion. Threshold of 8 to be considered if he develops concerns for coronary ischemia  - PT/OT     Chest pain  History of ischemic CM s/p CABG 4 v with last cath in 2014 showing patent LIMA to LAD, patent SVG to OM, patent SVG to distal RCA, and SVG to diagonal occluded  S/p ICD  EF of 45-50%  He describes as burning with shortness of breath, per daughter in law they have been wresting with heart failure in the past, but no previous anginal sxs were noted  EKG with possible new ST depressions anteriorly.   Troponin - elevated third test 0.5  Received an aspirin 10/12   Plan  - resting echocardiogram  - nitroglycerin prn  - cardiology consult  -trend troponin   - daily aspirin      Atrial fibrillation  No AC given large fall risks in the past  - continue coreg- rate is controlled   - hold  plavix in setting of hip hematoma , continue aspirin      Possible UTI  Plan  - on ceftriaxone and follow cultures    Dementia  - continue exelon      DVT Prophylaxis: Pneumatic Compression Devices  Code Status: Full Code.      Disposition: Expected discharge in 2-3 days once workup complete.        Interval History (Subjective):      Patient has had mild right hip pain with movements. No chest pain reported. No SOB, no abdominal pain. Afebrile. No chills.                   Physical Exam:      Last Vital Signs:  /49 (BP Location: Left arm)  Pulse 75  Temp 97.6  F (36.4  C) (Oral)  " Resp 20  Ht 1.727 m (5' 8\")  Wt 51.6 kg (113 lb 12.8 oz)  SpO2 95%  BMI 17.3 kg/m2         Constitutional: Frail, awake, alert, cooperative, no apparent distress   Respiratory: Clear to auscultation bilaterally, no crackles or wheezing   Cardiovascular: Regular rate and rhythm, normal S1 and S2, and 3/6 systolic murmur noted   Abdomen: Normal bowel sounds, soft, non-distended, non-tender   Skin: No rashes, no cyanosis, dry to touch   Neuro: Alert and oriented , no weakness, numbness, generalized muscle weakness    Extremities: No peripheral edema. Right lateral hip edema, palpatory tenderness    Other(s):        All other systems: Negative          Medications:      All current medications were reviewed with changes reflected in problem list.         Data:      All new lab and imaging data was reviewed.   Labs:    Recent Labs  Lab 10/13/18  0556 10/12/18  1656   * 141   POTASSIUM 4.3 4.4   CHLORIDE 113* 109   CO2 26 26   ANIONGAP 6 6   * 132*   BUN 44* 51*   CR 0.85 1.04   GFRESTIMATED 85 67   GFRESTBLACK >90 82   LUCIEN 8.1* 8.0*       Recent Labs  Lab 10/12/18  2005   NTBNPI 1857*       Recent Labs  Lab 10/13/18  0556 10/12/18  2355 10/12/18  1656   WBC 11.7*  --  11.8*   HGB 8.5* 8.5* 8.5*   HCT 25.3*  --  25.5*   MCV 97  --  97     --  140*     No results for input(s): TSH in the last 168 hours.   Imaging:   Recent Results (from the past 24 hour(s))   CT Hip Right w/o Contrast    Narrative    CT RIGHT HIP WITHOUT CONTRAST   10/12/2018 5:49 PM    HISTORY: Right hip pain after fall on Wednesday.    COMPARISON: None.    TECHNIQUE: Routine CT imaging is performed through the right hip  without contrast. Radiation dose for this scan was reduced using  automated exposure control, adjustment of the mA and/or kV according  to patient size, or iterative reconstruction technique.     FINDINGS: No fracture or osseous lesion is seen. There may be mild  joint space loss throughout the hip joint. There " is prominent  calcification in the vascular structures. There is calcification  within the prostate gland. There are scattered diverticula of the  visualized colon with no evidence of diverticulitis. No other soft  tissue abnormality is seen.      Impression    IMPRESSION: No fracture or other evidence of recent injury.     TANIKA ABREU MD   XR Femur Right 2 Views    Narrative    RIGHT FEMUR TWO VIEWS  10/12/2018 7:28 PM     HISTORY: Pain. Unwitnessed fall.    COMPARISON: None.    FINDINGS: No fracture or dislocation is identified. There are  degenerative changes at the hip and knee joint. Severe vascular  calcifications are present. Clips are present throughout the leg,  likely related to prior vascular surgery.      Impression    IMPRESSION: No fracture identified.    ACE SOLORIO MD   CT Chest/Abdomen/Pelvis w Contrast    Narrative    CT CHEST, ABDOMEN, AND PELVIS WITH CONTRAST  10/12/2018 7:57 PM    HISTORY: Fall, dementia, unclear source of pain and/or potential  bleeding. Unwitnessed fall. Anemic today. Hemoglobin 8.5.    COMPARISON: None.    TECHNIQUE: Routine transverse CT imaging of the chest, abdomen, and  pelvis was performed following the uneventful administration of 61mL  Isovue-370 intravenous contrast. Radiation dose for this scan was  reduced using automated exposure control, adjustment of the mA and/or  kV according to patient size, or iterative reconstruction technique.    FINDINGS:     Chest: The heart size is moderately enlarged. There are surgical  changes of a median sternotomy and presumed coronary bypass surgery.  No enlarged lymph node or other abnormal mediastinal mass is seen.  There is calcification within the thoracic aorta. No other vascular  abnormality is seen. There is mild atelectasis in both lung bases. No  pneumothorax is demonstrated. No pleural effusion is identified. There  are degenerative changes in the spine. No other osseous abnormality is  seen. No chest wall  pathology is seen.     Abdomen and pelvis: The liver is unremarkable. There is an  approximately 2 cm cyst within the spleen. The pancreas, gallbladder,  and left adrenal gland are unremarkable. The right adrenal gland is  not seen but there is extensive surgical metal artifact in this  region. There is a 2.0 cm cyst of the lateral aspect of the right  kidney with a 1.0 cm cyst in the inferior pole. There is a 3.2 cm  calculus within the left renal pelvis with numerous additional smaller  calculi elsewhere within the collecting system of the left kidney. The  left kidney is moderately atrophic with prominent likely chronic  hydronephrosis. No other urinary tract abnormality is seen. No  enlarged lymph node or other abnormal mass is demonstrated. No free  fluid is seen. No free intraperitoneal gas is identified. The  gastrointestinal tract is unremarkable. The appendix is not  identified. There is no additional evidence of appendicitis. There is  enlargement of the prostate gland. There is an endovascular  aortobiiliac stent. There is calcification elsewhere in the vascular  structures. There are degenerative changes in the spine. No other  osseous abnormality is seen. There is soft tissue fullness posterior  to the right hip. This includes what appears to be an approximately 7  cm round area of more increased density within the posterior thigh  musculature. No other adjacent soft tissue abnormality is seen.       Impression    IMPRESSION:   1. There is relative soft tissue prominence in the muscles posterior  to the right hip. This is suspicious for a hematoma.  2. There is a 3.2 cm calculus within the left renal pelvis with  numerous smaller calculi elsewhere in the kidney. There is prominent  left hydronephrosis which is likely chronic with left renal atrophy.    TANIKA ABREU MD   Head CT w/o contrast    Narrative    CT SCAN OF THE HEAD WITHOUT CONTRAST   10/12/2018 7:58 PM     HISTORY: Fall.  AMS.    TECHNIQUE: Axial images of the head and coronal reformations without  IV contrast material. Radiation dose for this scan was reduced using  automated exposure control, adjustment of the mA and/or kV according  to patient size, or iterative reconstruction technique.    COMPARISON: None.    FINDINGS: Moderate volume loss is present. Patchy white matter  hypoattenuation likely represents chronic small vessel ischemic  change. There is geographic hypoattenuation within the left parietal  lobe with lack of gray-white differentiation and likely associated  cortical volume loss. No evidence of intracranial hemorrhage. The  visualized calvarium, skull base, paranasal sinuses, and extracranial  soft tissues are unremarkable. Bilateral lens replacements are  present.      Impression    IMPRESSION:   1. Left parietal lobe infarct, likely chronic.  2. No evidence of intracranial hemorrhage.    ACE SOLORIO MD   XR Chest Port 1 View    Narrative    CHEST ONE VIEW PORTABLE  10/12/2018 11:54 PM     HISTORY:  Chest pain.    COMPARISON: None.      Impression    IMPRESSION: Cardiac device left chest wall, with lead tips projected  over the RA and RV. Prior midline sternotomy with postoperative  changes in the heart. Lungs clear. Aortic calcification. Pulmonary  vascularity is within normal limits.    BRII FARAH MD

## 2018-10-13 NOTE — CONSULTS
[facility]    Cardiology Consultation     Date of Admission:  10/12/2018    Assessment & Plan   Mr. Willis is a very pleasant 88-year-old male with a past medical history of coronary artery disease status post CABG with LIMA to the LAD, SVG to the OM, SVG to the distal RCA, and SVG to the diagonal (known to be occluded from coronary angiogram in 2014) also with a mild history of ischemic cardiomyopathy and chronic atrial fibrillation, not on anticoagulation due to recurrent falls, who was admitted after another fall in his assisted living facility. With this fall, he denies any prior chest pain shortness of breath, palpitations, or syncope. He felt that it was a mechanical fall related to instability. Mr. Willis continues to complain of significant right hip pain.  Because of the fall troponins were checked to rule out ACS, and were elevated. Troponin did peak at 0.7. Electrocardiogram illustrated atrial fibrillation, right bundle branch block, and Q waves in II, III, and aVF. Transthoracic echocardiogram illustrated a severely dilated left ventricle (LVEDD 68 mm), with an ejection fraction of 50-55%.  The ventricle was globally hypokinetic with abnormal septal wall motion consistent with conduction abnormalities, but there are no other specific regional wall motion abnormalities. There is only mild mitral valve regurgitation. There is mild to moderate aortic regurgitation.  Hemoglobin has remained relatively stable, although I do note a hemoglobin of 7.8 mg/dL today. Mr. Willis does have a history of anemia, so this does not appear to be significantly new.  However, given a brief sensation of chest pain/burning this afternoon, I did recommend 1 unit of RBCs given his known significant coronary artery disease.  He is also being treated for a possible urinary tract infection with ceftriaxone, but I note that white count has remained relatively stable.  When I assessed Mr. Payton this afternoon, he describes  all over body pain, that he attributed to his recent fall.  He also was experiencing some nausea, although he cannot describe his symptoms in further detail.  A repeat electrocardiogram appeared relatively unchanged.    #1 NSTEMI, consistent with probable Type II NSTEMI/demand ischemia  #2 Anemia  #3 Nausea/vomiting  #4 Recurrent falls  #5 Chronic atrial fibrillation, not on anticoagulation secondary to #4  I did have a long discussion with Mr. Payton and his son. Given he is feeling overall weakness and body pain, we can attempt to give a unit of red blood cells to see if this improves any of his weakness and generalized fatigue.  His hemoglobin was mildly low at 7.8 mg/dL, but he does have significant coronary artery disease and concern for ischemia.  He is also been quite hypotensive, with his carvedilol and lisinopril frequently being held. I would not recommend aggressive diuresis for Mr. Willis at this time, but he may require gentle diuresis tomorrow given we are going to administer a unit of red blood cells.    In regard to the NSTEMI, this could be related to demand ischemia. He has known significant coronary artery disease status post CABG.  He was quite hypotensive recently, suffered a recent fall, and is anemic. Therefore, demand ischemia would not be unlikely in his scenario.  I discussed the option of coronary angiography to rule out any new obstructive lesions with Mr. Willis and his son, although they would prefer noninvasive strategies at this point, which I think is very reasonable.  His left ventricle did show severe dilatation, but there is no regionality to his wall motion abnormalities. At this time, I do think that it is very reasonable to manage him medically.  If he becomes unstable, with ECG changes, a further rise in troponin, or if  he and his family decided to take a more aggressive approach, then we could discuss consideration of coronary angiography.  However, his vague symptoms of weakness  and nausea may not be strictly cardiac in nature and will require further evaluation.  Given his multiple comorbidities, I do wonder if involvement of our palliative care team may be appropriate at this time. I do note that he is quite frail, with a BMI of 17.    Plan:  1. One unit of RBCS  2. Device interrogation  3. Reasonable to continue aspirin 81 mg daily, as long as there is no concern for bleeding.  Although he does have an elevated [id number] score in the setting of chronic atrial fibrillation, he is not on anticoagulation due to his recurrent falls  4. Currently is on carvedilol 3.125 mg twice daily and lisinopril 2.5 mg daily, but these can be held if continually hypotensive  5. Given he has been quite orthostatic today, I will tentatively hold his Lasix in the morning.  I will reassess his volume status in the morning and reinitiate Lasix if necessary    Jaja Hensley MD    Code Status    Full Code    Reason for Consult   Reason for consult: Troponin elevation    Primary Care Physician   [facility]    Chief Complaint   Troponin elevation    History of Present Illness   Salo Willis is a 88 year old male with a past medical history of coronary artery disease status post CABG with LIMA to the LAD, SVG to OM, SVG to the distal RCA and SVG to diagonal.  Coronary angiography in 2014 illustrated a patent LIMA graft as well as a patent saphenous vein graft to the OM and distal RCA, but the vein graft to the diagonal was occluded.  He was noted to have ischemic cardiomyopathy with an ejection fraction of 45-50% with 2+ mitral valve regurgitation.  He also has a history of chronic atrial fibrillation and recurrent falls, therefore oral anticoagulation was not recommended.  He was previously followed at [facility] and has been on aspirin and Plavix.  He also has a history of an abdominal aortic aneurysm repair in 2005, a right carotid endarterectomy, multiple TIAs.  Also from a  cardiovascular standpoint, he has a history of paroxysmal ventricular tachycardia status post ICD implantation.    Mr. Willis recently moved from [place] to [place], and established his cardiovascular care recently with Dr. Penaloza.  During that evaluation, Dr. Mcginnis noted thats Mr. Willis was experiencing recurrent falls, and that some of his medications have been decreased due to hypotension.  There was concern because Mr. Willis was actually still on apixaban, although his cardiologist in [facility] recommended discontinuation.  Dr. Penaloza did discontinue the apixaban during the visit.  He also recommended reinitiation of a low dose of Lasix at 20 mg daily due to volume overload noted on exam.  The plan was to continue aspirin and 75 mg of Plavix in addition to the low dose of Lasix, his home carvedilol and lipitor..  Also, Dr. PENALOZA had recommended establishment in the vascular clinic for his iliac artery aneurysm.    Mr. Willis was admitted after a fall at his his assisted living facility, and cardiology was consulted due to concern for ACS given his elevated troponin. Troponin has trended from 0.02 --> --> 0.069 --> 0.5 --> 0.7 --> 0.69, and therefore there was concern for ACS.    Past Medical History   I have reviewed this patient's medical history and updated it with pertinent information if needed.   Past Medical History:   Diagnosis Date     Abdominal aortic aneurysm (H)      AICD (automatic cardioverter/defibrillator) present      Atrial fibrillation (H)      CAD (coronary artery disease)      Cardiomyopathy (H)      Carotid artery disease (H)      CHF (congestive heart failure) (H)      GERD (gastroesophageal reflux disease)      Hyperlipidemia      Inguinal hernia      Paroxysmal VT (H)      Prostate cancer (H)      S/P AAA repair      S/P CABG (coronary artery bypass graft)      S/P TURP        Past Surgical History   I have reviewed this patient's surgical history and updated it with pertinent information if  needed.  Past Surgical History:   Procedure Laterality Date     AICD, DUAL CHAMBER  08/29/2008    [facility] DR ACUÑA 2207-36. Implanted by Dr. Marks with [facility] in [place].     CAROTID ENDARTERECTOMY Right      CORONARY ARTERY BYPASS      LIMA to LAD, SVG to OM, SVG to distal RCA, and SVG to Diagonal     HEART CATH LEFT HEART CATH  10/17/2014    LM, RCA, LAD, and left CFX occlusions: patent LIMA graft to LAD, patent SVGs to OM and distal RCA. Vein graft to diagonal previously found to be occluded. Mild aortic stenosis with 20 mmHg gradient across the aortic valve. Continue medical therapy.        Prior to Admission Medications   Prior to Admission Medications   Prescriptions Last Dose Informant Patient Reported? Taking?   acetaminophen (TYLENOL) 500 MG tablet 10/11/2018 at 0220  Yes Yes   Sig: Take 1,000 mg by mouth 3 times daily as needed for pain   aspirin 81 MG EC tablet 10/12/2018 at am  No Yes   Sig: Take 1 tablet (81 mg) by mouth daily   atorvastatin (LIPITOR) 10 MG tablet 10/12/2018 at am  Yes Yes   Sig: Take 10 mg by mouth daily   carvedilol (COREG) 6.25 MG tablet 10/12/2018 at am  Yes Yes   Sig: Take 3.125 mg by mouth 2 times daily 6.25 mg x 0.5 tabs= 3.125mg   cholecalciferol (VITAMIN  -D) 1000 units capsule 10/12/2018 at am  Yes Yes   Sig: Take 2 capsules by mouth daily   clopidogrel (PLAVIX) 75 MG tablet 10/12/2018 at am  No Yes   Sig: Take 1 tablet (75 mg) by mouth daily   docusate sodium (STOOL SOFTENER) 100 MG tablet 10/11/2018 at 1700  Yes Yes   Sig: Take 100 mg by mouth every evening    furosemide (LASIX) 20 MG tablet 10/12/2018 at am  No Yes   Sig: Take 1 tablet (20 mg) by mouth daily   lisinopril (PRINIVIL/ZESTRIL) 2.5 MG tablet 10/11/2018 at 1700  No Yes   Sig: Take 1 tablet (2.5 mg) by mouth daily   mirtazapine (REMERON) 7.5 mg TABS 10/11/2018 at hs  Yes Yes   Sig: Take 7.5 mg by mouth At Bedtime    oxyCODONE HCl (OXAYDO) 5 MG TABA 10/12/2018 at 0713  Yes Yes   Sig: Take 2.5  mg by mouth every 4 hours as needed (pain) 5mg tab x 0.5 tab = 2.5mg   potassium chloride SA (K-DUR/KLOR-CON M) 10 MEQ CR tablet 10/11/2018 at 1700  Yes Yes   Sig: Take 10 mEq by mouth every evening    ranitidine (ZANTAC) 150 MG tablet 10/11/2018 at hs  Yes Yes   Sig: Take 150 mg by mouth At Bedtime    rivastigmine (EXELON) 1.5 MG capsule 10/12/2018 at am  Yes Yes   Sig: Take 1.5 mg by mouth 2 times daily (with meals)    traZODone (DESYREL) 50 MG tablet 10/11/2018 at 2000  Yes Yes   Sig: Take 50 mg by mouth At Bedtime      Facility-Administered Medications: None     Allergies   Allergies   Allergen Reactions     Niacin        Social History   I have reviewed this patient's social history and updated it with pertinent information if needed. Salo Willis  reports that he has quit smoking. His smoking use included Cigarettes. He has quit using smokeless tobacco.    Family History   Family history reviewed with patient and is noncontributory.    Review of Systems   The 10 point Review of Systems is negative other than noted in the HPI or here.     Physical Exam   Temp: 97.3  F (36.3  C) Temp src: Axillary BP: 94/61 Pulse: 97 Heart Rate: 75 Resp: 20 SpO2: 97 % O2 Device: None (Room air)    Vital Signs with Ranges  Temp:  [97.3  F (36.3  C)-97.4  F (36.3  C)] 97.3  F (36.3  C)  Pulse:  [75-97] 97  Heart Rate:  [] 75  Resp:  [12-30] 20  BP: ()/(44-98) 94/61  SpO2:  [94 %-97 %] 97 %  113 lbs 12.8 oz    Constitutional: Patient appearing in mild distress, with nausea and overall pain secondary to recent falls  HEENT: Moist oral mucosa  Respiratory: Decreased respiratory effort with reduced breath sounds bilaterally   Cardiovascular: JVP not significantly elevated. No RV heave, and PMI not laterally displaced. Regular rate and rhythm. Normal S1 and S2.  2 out of 6 systolic murmur heard at the right and left upper sternal borders  Abdomen: Soft, nontender/nondistended normoactive bowel sounds  Skin: No stasis  dermatitis.   Extremities: Trace lower extremity edema.  Neurologic: Moving all extremities. No facial assymmetry.  Psychiatric: Alert and oriented. Answers questions appropriately.      Data   Results for orders placed or performed during the hospital encounter of 10/12/18 (from the past 24 hour(s))   Occult blood stool   Result Value Ref Range    Occult Blood Negative NEG^Negative   Blood culture   Result Value Ref Range    Specimen Description Blood Right Arm     Special Requests Aerobic and anaerobic bottles received     Culture Micro No growth after 13 hours    Lactic acid   Result Value Ref Range    Lactic Acid 1.3 0.4 - 2.0 mmol/L   XR Femur Right 2 Views    Narrative    RIGHT FEMUR TWO VIEWS  10/12/2018 7:28 PM     HISTORY: Pain. Unwitnessed fall.    COMPARISON: None.    FINDINGS: No fracture or dislocation is identified. There are  degenerative changes at the hip and knee joint. Severe vascular  calcifications are present. Clips are present throughout the leg,  likely related to prior vascular surgery.      Impression    IMPRESSION: No fracture identified.    ACE SOLORIO MD   CT Chest/Abdomen/Pelvis w Contrast    Narrative    CT CHEST, ABDOMEN, AND PELVIS WITH CONTRAST  10/12/2018 7:57 PM    HISTORY: Fall, dementia, unclear source of pain and/or potential  bleeding. Unwitnessed fall. Anemic today. Hemoglobin 8.5.    COMPARISON: None.    TECHNIQUE: Routine transverse CT imaging of the chest, abdomen, and  pelvis was performed following the uneventful administration of 61mL  Isovue-370 intravenous contrast. Radiation dose for this scan was  reduced using automated exposure control, adjustment of the mA and/or  kV according to patient size, or iterative reconstruction technique.    FINDINGS:     Chest: The heart size is moderately enlarged. There are surgical  changes of a median sternotomy and presumed coronary bypass surgery.  No enlarged lymph node or other abnormal mediastinal mass is seen.  There is  calcification within the thoracic aorta. No other vascular  abnormality is seen. There is mild atelectasis in both lung bases. No  pneumothorax is demonstrated. No pleural effusion is identified. There  are degenerative changes in the spine. No other osseous abnormality is  seen. No chest wall pathology is seen.     Abdomen and pelvis: The liver is unremarkable. There is an  approximately 2 cm cyst within the spleen. The pancreas, gallbladder,  and left adrenal gland are unremarkable. The right adrenal gland is  not seen but there is extensive surgical metal artifact in this  region. There is a 2.0 cm cyst of the lateral aspect of the right  kidney with a 1.0 cm cyst in the inferior pole. There is a 3.2 cm  calculus within the left renal pelvis with numerous additional smaller  calculi elsewhere within the collecting system of the left kidney. The  left kidney is moderately atrophic with prominent likely chronic  hydronephrosis. No other urinary tract abnormality is seen. No  enlarged lymph node or other abnormal mass is demonstrated. No free  fluid is seen. No free intraperitoneal gas is identified. The  gastrointestinal tract is unremarkable. The appendix is not  identified. There is no additional evidence of appendicitis. There is  enlargement of the prostate gland. There is an endovascular  aortobiiliac stent. There is calcification elsewhere in the vascular  structures. There are degenerative changes in the spine. No other  osseous abnormality is seen. There is soft tissue fullness posterior  to the right hip. This includes what appears to be an approximately 7  cm round area of more increased density within the posterior thigh  musculature. No other adjacent soft tissue abnormality is seen.       Impression    IMPRESSION:   1. There is relative soft tissue prominence in the muscles posterior  to the right hip. This is suspicious for a hematoma.  2. There is a 3.2 cm calculus within the left renal pelvis  with  numerous smaller calculi elsewhere in the kidney. There is prominent  left hydronephrosis which is likely chronic with left renal atrophy.    TANIKA ABREU MD   Head CT w/o contrast    Narrative    CT SCAN OF THE HEAD WITHOUT CONTRAST   10/12/2018 7:58 PM     HISTORY: Fall. AMS.    TECHNIQUE: Axial images of the head and coronal reformations without  IV contrast material. Radiation dose for this scan was reduced using  automated exposure control, adjustment of the mA and/or kV according  to patient size, or iterative reconstruction technique.    COMPARISON: None.    FINDINGS: Moderate volume loss is present. Patchy white matter  hypoattenuation likely represents chronic small vessel ischemic  change. There is geographic hypoattenuation within the left parietal  lobe with lack of gray-white differentiation and likely associated  cortical volume loss. No evidence of intracranial hemorrhage. The  visualized calvarium, skull base, paranasal sinuses, and extracranial  soft tissues are unremarkable. Bilateral lens replacements are  present.      Impression    IMPRESSION:   1. Left parietal lobe infarct, likely chronic.  2. No evidence of intracranial hemorrhage.    ACE SOLORIO MD   Blood culture   Result Value Ref Range    Specimen Description Blood Right Arm     Special Requests Aerobic and anaerobic bottles received     Culture Micro No growth after 13 hours    Troponin I (now)   Result Value Ref Range    Troponin I ES 0.022 0.000 - 0.045 ug/L   Nt probnp inpatient   Result Value Ref Range    N-Terminal Pro BNP Inpatient 1857 (H) 0 - 1800 pg/mL   EKG 12 lead   Result Value Ref Range    Interpretation ECG Click View Image link to view waveform and result    UA with Microscopic   Result Value Ref Range    Color Urine Yellow     Appearance Urine Slightly Cloudy     Glucose Urine Negative NEG^Negative mg/dL    Bilirubin Urine Negative NEG^Negative    Ketones Urine Negative NEG^Negative mg/dL    Specific  Gravity Urine 1.011 1.003 - 1.035    Blood Urine Small (A) NEG^Negative    pH Urine 5.0 5.0 - 7.0 pH    Protein Albumin Urine 10 (A) NEG^Negative mg/dL    Urobilinogen mg/dL Normal 0.0 - 2.0 mg/dL    Nitrite Urine Negative NEG^Negative    Leukocyte Esterase Urine Large (A) NEG^Negative    Source Midstream Urine     WBC Urine 153 (H) 0 - 5 /HPF    RBC Urine 10 (H) 0 - 2 /HPF    Mucous Urine Present (A) NEG^Negative /LPF    Hyaline Casts 13 (H) 0 - 2 /LPF   Urine Culture   Result Value Ref Range    Specimen Description Midstream Urine     Special Requests Specimen received in preservative     Culture Micro Culture negative < 24 hours, reincubate    XR Chest Port 1 View    Narrative    CHEST ONE VIEW PORTABLE  10/12/2018 11:54 PM     HISTORY:  Chest pain.    COMPARISON: None.      Impression    IMPRESSION: Cardiac device left chest wall, with lead tips projected  over the RA and RV. Prior midline sternotomy with postoperative  changes in the heart. Lungs clear. Aortic calcification. Pulmonary  vascularity is within normal limits.    BRII FARAH MD   PTT   Result Value Ref Range    PTT 33 22 - 37 sec   Troponin I   Result Value Ref Range    Troponin I ES 0.069 (H) 0.000 - 0.045 ug/L   Hemoglobin   Result Value Ref Range    Hemoglobin 8.5 (L) 13.3 - 17.7 g/dL   Comprehensive metabolic panel   Result Value Ref Range    Sodium 145 (H) 133 - 144 mmol/L    Potassium 4.3 3.4 - 5.3 mmol/L    Chloride 113 (H) 94 - 109 mmol/L    Carbon Dioxide 26 20 - 32 mmol/L    Anion Gap 6 3 - 14 mmol/L    Glucose 119 (H) 70 - 99 mg/dL    Urea Nitrogen 44 (H) 7 - 30 mg/dL    Creatinine 0.85 0.66 - 1.25 mg/dL    GFR Estimate 85 >60 mL/min/1.7m2    GFR Estimate If Black >90 >60 mL/min/1.7m2    Calcium 8.1 (L) 8.5 - 10.1 mg/dL    Bilirubin Total 0.2 0.2 - 1.3 mg/dL    Albumin 2.3 (L) 3.4 - 5.0 g/dL    Protein Total 5.8 (L) 6.8 - 8.8 g/dL    Alkaline Phosphatase 74 40 - 150 U/L    ALT 12 0 - 70 U/L    AST 15 0 - 45 U/L   CBC with platelets    Result Value Ref Range    WBC 11.7 (H) 4.0 - 11.0 10e9/L    RBC Count 2.61 (L) 4.4 - 5.9 10e12/L    Hemoglobin 8.5 (L) 13.3 - 17.7 g/dL    Hematocrit 25.3 (L) 40.0 - 53.0 %    MCV 97 78 - 100 fl    MCH 32.6 26.5 - 33.0 pg    MCHC 33.6 31.5 - 36.5 g/dL    RDW 13.8 10.0 - 15.0 %    Platelet Count 152 150 - 450 10e9/L   Troponin I   Result Value Ref Range    Troponin I ES 0.551 (HH) 0.000 - 0.045 ug/L   Troponin I   Result Value Ref Range    Troponin I ES 0.711 (HH) 0.000 - 0.045 ug/L   ECHO COMPLETE WITH OPTISON    Narrative    [id number]  [id number]  [id number]  [id number]^[id number]^ACE^JORGE           [facility]  Echocardiography Laboratory  [address] [place] [id number]        Name: CARI HICKMAN  MRN: [id number]  : 1930  Study Date: 10/13/2018 09:37 AM  Age: 88 yrs  Gender: Male  Patient Location: [facility]  Reason For Study: Chest Pain  Ordering Physician: ACE CALABRESE  Referring Physician: MICHAEL MOORE PHYSICIAN  Performed By: Mariann Stern     BSA: 1.6 m2  Height: 68 in  Weight: 113 lb  HR: 96  BP: 103/49 mmHg  _____________________________________________________________________________  __        Procedure  Complete Portable Echo Adult. Contrast Optison.  _____________________________________________________________________________  __        Interpretation Summary     Moderate to severe aortic root dilatation, 4.6 cm.  The ascending aorta is Mildly dilated.  The left ventricle is severely dilated.  The visual ejection fraction is estimated at 50-55%.  There is a catheter/pacemaker lead seen in the right ventricle.  The left atrium is severely dilated.  The right atrium is mild to moderately dilated.  There is mild (1+) mitral regurgitation.  There is moderate to severe mitral annular calcification.  Right ventricular systolic pressure is elevated, consistent with mild  pulmonary hypertension.  There is mild to moderate (1-2+) aortic regurgitation.  The rhythm was  atrial fibrillation.  Mild valvular aortic stenosis.  The study was technically difficult. Contrast was used without apparent  complications. There is no comparison study available.  _____________________________________________________________________________  __        Left Ventricle  The left ventricle is severely dilated. There is borderline concentric left  ventricular hypertrophy. The visual ejection fraction is estimated at 50-55%.  Left ventricular diastolic function is indeterminate. There is borderline  global hypokinesia of the left ventricle. Septal wall motion abnormality may  reflect pacemaker activation.     Right Ventricle  There is a catheter/pacemaker lead seen in the right ventricle. The right  ventricle is normal in structure, function and size.     Atria  The left atrium is severely dilated. There is a catheter/pacemaker lead seen  in the right atrium. The right atrium is mild to moderately dilated.     Mitral Valve  There is moderate to severe mitral annular calcification. The mitral valve  leaflets appear thickened, but open well. There is mild (1+) mitral  regurgitation.        Tricuspid Valve  The tricuspid valve is not well visualized, but is grossly normal. Right  ventricular systolic pressure is elevated, consistent with mild pulmonary  hypertension. There is trace tricuspid regurgitation.     Aortic Valve  There is mild to moderate (1-2+) aortic regurgitation. Mild valvular aortic  stenosis. The calculated aortic valve area is 1.6cm2.     Pulmonic Valve  The pulmonic valve is not well visualized.     Vessels  Moderate aortic root dilatation. The ascending aorta is Mildly dilated.     Pericardium  There is no pericardial effusion.        Rhythm  The rhythm was atrial fibrillation.  _____________________________________________________________________________  __  MMode/2D Measurements & Calculations  IVSd: 1.2 cm     LVIDd: 6.8 cm  LVIDs: 6.1 cm  LVPWd: 1.1 cm  FS: 10.9 %  LV mass(C)d:  365.5 grams  LV mass(C)dI: 227.9 grams/m2  Ao root diam: 4.6 cm  LA dimension: 4.7 cm  asc Aorta Diam: 3.9 cm  LA/Ao: 1.0  LVOT diam: 2.3 cm  LVOT area: 4.1 cm2  LA Volume (BP): 149.0 ml  LA Volume Index (BP): 93.1 ml/m2  RWT: 0.33           Doppler Measurements & Calculations  MV E max beltran: 107.9 cm/sec  MV dec time: 0.17 sec  Ao V2 max: 270.3 cm/sec  Ao max P.0 mmHg  Ao V2 mean: 156.7 cm/sec  Ao mean P.3 mmHg  Ao V2 VTI: 43.3 cm  HENNY(I,D): 1.7 cm2  HENNY(V,D): 1.6 cm2  AI P1/2t: 431.7 msec  LV V1 max P.5 mmHg  LV V1 max: 105.9 cm/sec  LV V1 VTI: 18.2 cm  SV(LVOT): 75.0 ml  SI(LVOT): 46.8 ml/m2  TR max beltran: 245.4 cm/sec  TR max P.1 mmHg  AV Beltran Ratio (DI): 0.39  HENNY Index (cm2/m2): 1.1  E/E' av.7  Lateral E/e': 10.8  Medial E/e': 14.6              _____________________________________________________________________________  __        Report approved by: Perfecto Avalos 10/13/2018 12:53 PM      Hemoglobin   Result Value Ref Range    Hemoglobin 7.8 (L) 13.3 - 17.7 g/dL   Troponin I   Result Value Ref Range    Troponin I ES 0.693 (HH) 0.000 - 0.045 ug/L   EKG 12-lead, tracing only   Result Value Ref Range    Interpretation ECG Click View Image link to view waveform and result

## 2018-10-13 NOTE — H&P
[facility]    History and Physical  Hospitalist       Date of Admission:  10/12/2018    Assessment & Plan   Salo Willis is a 88 year old male who presents with right hip pain found to have a hematoma    RIght hip hematoma  Post traumatic in the setting of falls  No fracture on the XRs's  Plan  - serial hemoglobins  - if drops < 7 consider blood transfusion. Threshold of 8 to be considered if he develops concerns for coronary ischemia  - PT/OT    Chest pain  History of ischemic CM s/p CABG 4 v with last cath in 2014 showing patent LIMA to LAD, patent SVG to OM, patent SVG to distal RCA, and SVG to diagonal occluded  S/p ICD  EF of 45-50%  He describes as burning with shortness of breath, per daughter in law they have been wresting with heart failure in the past, but no previous anginal sxs were noted  EKG with possible new ST depressions anteriorly  troponins negative x2  Received an aspirin this morning  Plan  - difficult to say if ischemic, poor historian in the setting of dementia, he appears moderately comfortable  - pain occurred right after contrast administered orally  - Chest XR  - serial troponins  - resting echocardiogram  - consider GI cocktail if continues  - add on BNP  - nitroglycerin prn    Atrial fibrillation  No AC given large fall risks in the past  - continue coreg  - hold the aspirin and plavix    Possible UTI  Plan  - start ceftriaxone and follow cultures    Dementia  - continue exelon    DVT Prophylaxis: Pneumatic Compression Devices  Code Status: Full Code. Discussed with son susie. We discussed considering no code status given his comorbidity and poor functional status/dementia. They decline, would need to address ICD in the future if this was the case.    Disposition: Expected discharge in 2-3 days once workup complete.    Tono Martínez DO    Primary Care Physician   [facility]    Chief Complaint   fall    History is obtained from the patient's  nahum Cox    History of Present Illness   Salo Willis is a 88 year old male who presents with right hip pain. Basically he lives at Assisted Living and his family pays for close to 24 hour care. He can walk with a walker, but does not do his grocery shopping. He falls quite frequently, but takes aspirin and plavix for his history of CAD and high risk atrial fibrillation. He fell two days ago, unwitnessed. Further history is not obtainable because of dementia.     In the ED he was found to have right gluteal hematoma on the CT CAP. He developed alia substernal burning of the chest after the procedure. But EKG did not show any ALTAGRACIA. He cannot provide more history, states his pain has been more chronic in nature, no radiation. He cannot say if its exertional.     Past Medical History    I have reviewed this patient's medical history and updated it with pertinent information if needed.   Past Medical History:   Diagnosis Date     Abdominal aortic aneurysm (H)      AICD (automatic cardioverter/defibrillator) present      Atrial fibrillation (H)      CAD (coronary artery disease)      Cardiomyopathy (H)      Carotid artery disease (H)      CHF (congestive heart failure) (H)      GERD (gastroesophageal reflux disease)      Hyperlipidemia      Inguinal hernia      Paroxysmal VT (H)      Prostate cancer (H)      S/P AAA repair      S/P CABG (coronary artery bypass graft)      S/P TURP        Past Surgical History   I have reviewed this patient's surgical history and updated it with pertinent information if needed.  Past Surgical History:   Procedure Laterality Date     AICD, DUAL CHAMBER  08/29/2008    [facility] [employer] DR ACUÑA [id number]. Implanted by Dr. Marks with [facility] in [place].     CAROTID ENDARTERECTOMY Right      CORONARY ARTERY BYPASS      LIMA to LAD, SVG to OM, SVG to distal RCA, and SVG to Diagonal     HEART CATH LEFT HEART CATH  10/17/2014    LM, RCA, LAD, and left CFX occlusions: patent LIMA  graft to LAD, patent SVGs to OM and distal RCA. Vein graft to diagonal previously found to be occluded. Mild aortic stenosis with 20 mmHg gradient across the aortic valve. Continue medical therapy.        Prior to Admission Medications   Prior to Admission Medications   Prescriptions Last Dose Informant Patient Reported? Taking?   acetaminophen (TYLENOL) 500 MG tablet 10/11/2018 at 0220  Yes Yes   Sig: Take 1,000 mg by mouth 3 times daily as needed for pain   aspirin 81 MG EC tablet 10/12/2018 at am  No Yes   Sig: Take 1 tablet (81 mg) by mouth daily   atorvastatin (LIPITOR) 10 MG tablet 10/12/2018 at am  Yes Yes   Sig: Take 10 mg by mouth daily   carvedilol (COREG) 6.25 MG tablet 10/12/2018 at am  Yes Yes   Sig: Take 3.125 mg by mouth 2 times daily 6.25 mg x 0.5 tabs= 3.125mg   cholecalciferol (VITAMIN  -D) 1000 units capsule 10/12/2018 at am  Yes Yes   Sig: Take 2 capsules by mouth daily   clopidogrel (PLAVIX) 75 MG tablet 10/12/2018 at am  No Yes   Sig: Take 1 tablet (75 mg) by mouth daily   docusate sodium (STOOL SOFTENER) 100 MG tablet 10/11/2018 at 1700  Yes Yes   Sig: Take 100 mg by mouth every evening    furosemide (LASIX) 20 MG tablet 10/12/2018 at am  No Yes   Sig: Take 1 tablet (20 mg) by mouth daily   lisinopril (PRINIVIL/ZESTRIL) 2.5 MG tablet 10/11/2018 at 1700  No Yes   Sig: Take 1 tablet (2.5 mg) by mouth daily   mirtazapine (REMERON) 7.5 mg TABS 10/11/2018 at hs  Yes Yes   Sig: Take 7.5 mg by mouth At Bedtime    oxyCODONE HCl (OXAYDO) 5 MG TABA 10/12/2018 at 0713  Yes Yes   Sig: Take 2.5 mg by mouth every 4 hours as needed (pain) 5mg tab x 0.5 tab = 2.5mg   potassium chloride SA (K-DUR/KLOR-CON M) 10 MEQ CR tablet 10/11/2018 at 1700  Yes Yes   Sig: Take 10 mEq by mouth every evening    ranitidine (ZANTAC) 150 MG tablet 10/11/2018 at hs  Yes Yes   Sig: Take 150 mg by mouth At Bedtime    rivastigmine (EXELON) 1.5 MG capsule 10/12/2018 at am  Yes Yes   Sig: Take 1.5 mg by mouth 2 times daily (with  meals)    traZODone (DESYREL) 50 MG tablet 10/11/2018 at 2000  Yes Yes   Sig: Take 50 mg by mouth At Bedtime      Facility-Administered Medications: None     Allergies   Allergies   Allergen Reactions     Niacin        Social History   I have reviewed this patient's social history and updated it with pertinent information if needed. Salo Willis  reports that he has quit smoking. His smoking use included Cigarettes. He has quit using smokeless tobacco.    Family History   I have reviewed this patient's family history and updated it with pertinent information if needed.   Family History   Problem Relation Age of Onset     Unknown/Adopted Mother      Unknown/Adopted Father        Review of Systems   Review of systems not obtained due to patient factors - confusion and dementia    Physical Exam   Temp: 97.6  F (36.4  C) Temp src: Oral BP: 122/82 Pulse: 78   Resp: 16 SpO2: 96 %      Vital Signs with Ranges  Temp:  [97.6  F (36.4  C)] 97.6  F (36.4  C)  Pulse:  [78] 78  Resp:  [16] 16  BP: ()/(50-98) 122/82  SpO2:  [95 %-97 %] 96 %  140 lbs 0 oz    Constitutional: Awake, alert, cooperative, no apparent distress.  Eyes: Conjunctiva and pupils examined and normal.  HEENT: Moist mucous membranes, normal dentition.  Respiratory: Clear to auscultation bilaterally, no crackles or wheezing.  Cardiovascular: Regular rate and rhythm, normal S1 and S2, and no murmur noted.  GI: Soft, non-distended, non-tender, normal bowel sounds.  Lymph/Hematologic: No anterior cervical or supraclavicular adenopathy.  Skin: No rashes, no cyanosis, no edema.  Musculoskeletal: marked pain with palpation of the right buttock  Psychiatric: Alert, oriented to person, but not time, date, or place    Data   Data reviewed today:  I personally reviewed EKG showing RBBB, inferior t wave inversions are old, new anterior t wave inversions.  CT head with chronic parietal infarct  CT CAP: right hip hematoma  CT right hip with no fracture, none on  xr either    Recent Labs  Lab 10/12/18  2005 10/12/18  1656   WBC  --  11.8*   HGB  --  8.5*   MCV  --  97   PLT  --  140*   NA  --  141   POTASSIUM  --  4.4   CHLORIDE  --  109   CO2  --  26   BUN  --  51*   CR  --  1.04   ANIONGAP  --  6   LUCIEN  --  8.0*   GLC  --  132*   TROPI 0.022 0.017       Imaging:  Recent Results (from the past 24 hour(s))   CT Hip Right w/o Contrast    Narrative    CT RIGHT HIP WITHOUT CONTRAST   10/12/2018 5:49 PM    HISTORY: Right hip pain after fall on Wednesday.    COMPARISON: None.    TECHNIQUE: Routine CT imaging is performed through the right hip  without contrast. Radiation dose for this scan was reduced using  automated exposure control, adjustment of the mA and/or kV according  to patient size, or iterative reconstruction technique.     FINDINGS: No fracture or osseous lesion is seen. There may be mild  joint space loss throughout the hip joint. There is prominent  calcification in the vascular structures. There is calcification  within the prostate gland. There are scattered diverticula of the  visualized colon with no evidence of diverticulitis. No other soft  tissue abnormality is seen.      Impression    IMPRESSION: No fracture or other evidence of recent injury.     TANIKA ABREU MD   XR Femur Right 2 Views    Narrative    RIGHT FEMUR TWO VIEWS  10/12/2018 7:28 PM     HISTORY: Pain. Unwitnessed fall.    COMPARISON: None.    FINDINGS: No fracture or dislocation is identified. There are  degenerative changes at the hip and knee joint. Severe vascular  calcifications are present. Clips are present throughout the leg,  likely related to prior vascular surgery.      Impression    IMPRESSION: No fracture identified.    ACE SOLORIO MD   CT Chest/Abdomen/Pelvis w Contrast    Narrative    CT CHEST, ABDOMEN, AND PELVIS WITH CONTRAST  10/12/2018 7:57 PM    HISTORY: Fall, dementia, unclear source of pain and/or potential  bleeding. Unwitnessed fall. Anemic today. Hemoglobin  8.5.    COMPARISON: None.    TECHNIQUE: Routine transverse CT imaging of the chest, abdomen, and  pelvis was performed following the uneventful administration of 61mL  Isovue-370 intravenous contrast. Radiation dose for this scan was  reduced using automated exposure control, adjustment of the mA and/or  kV according to patient size, or iterative reconstruction technique.    FINDINGS:     Chest: The heart size is moderately enlarged. There are surgical  changes of a median sternotomy and presumed coronary bypass surgery.  No enlarged lymph node or other abnormal mediastinal mass is seen.  There is calcification within the thoracic aorta. No other vascular  abnormality is seen. There is mild atelectasis in both lung bases. No  pneumothorax is demonstrated. No pleural effusion is identified. There  are degenerative changes in the spine. No other osseous abnormality is  seen. No chest wall pathology is seen.     Abdomen and pelvis: The liver is unremarkable. There is an  approximately 2 cm cyst within the spleen. The pancreas, gallbladder,  and left adrenal gland are unremarkable. The right adrenal gland is  not seen but there is extensive surgical metal artifact in this  region. There is a 2.0 cm cyst of the lateral aspect of the right  kidney with a 1.0 cm cyst in the inferior pole. There is a 3.2 cm  calculus within the left renal pelvis with numerous additional smaller  calculi elsewhere within the collecting system of the left kidney. The  left kidney is moderately atrophic with prominent likely chronic  hydronephrosis. No other urinary tract abnormality is seen. No  enlarged lymph node or other abnormal mass is demonstrated. No free  fluid is seen. No free intraperitoneal gas is identified. The  gastrointestinal tract is unremarkable. The appendix is not  identified. There is no additional evidence of appendicitis. There is  enlargement of the prostate gland. There is an endovascular  aortobiiliac stent. There is  calcification elsewhere in the vascular  structures. There are degenerative changes in the spine. No other  osseous abnormality is seen. There is soft tissue fullness posterior  to the right hip. This includes what appears to be an approximately 7  cm round area of more increased density within the posterior thigh  musculature. No other adjacent soft tissue abnormality is seen.       Impression    IMPRESSION:   1. There is relative soft tissue prominence in the muscles posterior  to the right hip. This is suspicious for a hematoma.  2. There is a 3.2 cm calculus within the left renal pelvis with  numerous smaller calculi elsewhere in the kidney. There is prominent  left hydronephrosis which is likely chronic with left renal atrophy.    TANIKA ABREU MD   Head CT w/o contrast    Narrative    CT SCAN OF THE HEAD WITHOUT CONTRAST   10/12/2018 7:58 PM     HISTORY: Fall. AMS.    TECHNIQUE: Axial images of the head and coronal reformations without  IV contrast material. Radiation dose for this scan was reduced using  automated exposure control, adjustment of the mA and/or kV according  to patient size, or iterative reconstruction technique.    COMPARISON: None.    FINDINGS: Moderate volume loss is present. Patchy white matter  hypoattenuation likely represents chronic small vessel ischemic  change. There is geographic hypoattenuation within the left parietal  lobe with lack of gray-white differentiation and likely associated  cortical volume loss. No evidence of intracranial hemorrhage. The  visualized calvarium, skull base, paranasal sinuses, and extracranial  soft tissues are unremarkable. Bilateral lens replacements are  present.      Impression    IMPRESSION:   1. Left parietal lobe infarct, likely chronic.  2. No evidence of intracranial hemorrhage.    ACE SOLORIO MD

## 2018-10-13 NOTE — PHARMACY-ADMISSION MEDICATION HISTORY
Admission medication history interview status for the 10/12/2018  admission is complete. See EPIC admission navigator for prior to admission medications     Medication history source reliability:Good, med list from [facility]     Actions taken by pharmacist (provider contacted, etc):added oxycodone     Additional medication history information not noted on PTA med list :None    Medication reconciliation/reorder completed by provider prior to medication history? No    Time spent in this activity: 15 minutes    Prior to Admission medications    Medication Sig Last Dose Taking? Auth Provider   acetaminophen (TYLENOL) 500 MG tablet Take 1,000 mg by mouth 3 times daily as needed for pain 10/11/2018 at 0220 Yes Unknown, Entered By History   aspirin 81 MG EC tablet Take 1 tablet (81 mg) by mouth daily 10/12/2018 at am Yes Huber Penaloza MD   atorvastatin (LIPITOR) 10 MG tablet Take 10 mg by mouth daily 10/12/2018 at am Yes Reported, Patient   carvedilol (COREG) 6.25 MG tablet Take 3.125 mg by mouth 2 times daily 6.25 mg x 0.5 tabs= 3.125mg 10/12/2018 at am Yes Unknown, Entered By History   cholecalciferol (VITAMIN  -D) 1000 units capsule Take 2 capsules by mouth daily 10/12/2018 at am Yes Reported, Patient   clopidogrel (PLAVIX) 75 MG tablet Take 1 tablet (75 mg) by mouth daily 10/12/2018 at am Yes Huber Penaloza MD   docusate sodium (STOOL SOFTENER) 100 MG tablet Take 100 mg by mouth every evening  10/11/2018 at 1700 Yes Reported, Patient   furosemide (LASIX) 20 MG tablet Take 1 tablet (20 mg) by mouth daily 10/12/2018 at am Yes Huber Penaloza MD   lisinopril (PRINIVIL/ZESTRIL) 2.5 MG tablet Take 1 tablet (2.5 mg) by mouth daily 10/11/2018 at 1700 Yes Huber Penaloza MD   mirtazapine (REMERON) 7.5 mg TABS Take 7.5 mg by mouth At Bedtime  10/11/2018 at hs Yes Reported, Patient   oxyCODONE HCl (OXAYDO) 5 MG TABA Take 2.5 mg by mouth every 4 hours as needed (pain) 5mg tab x 0.5 tab = 2.5mg 10/12/2018  at 0713 Yes Unknown, Entered By History   potassium chloride SA (K-DUR/KLOR-CON M) 10 MEQ CR tablet Take 10 mEq by mouth every evening  10/11/2018 at 1700 Yes Reported, Patient   ranitidine (ZANTAC) 150 MG tablet Take 150 mg by mouth At Bedtime  10/11/2018 at hs Yes Reported, Patient   rivastigmine (EXELON) 1.5 MG capsule Take 1.5 mg by mouth 2 times daily (with meals)  10/12/2018 at am Yes Reported, Patient   traZODone (DESYREL) 50 MG tablet Take 50 mg by mouth At Bedtime 10/11/2018 at 2000 Yes Reported, Patient

## 2018-10-13 NOTE — PLAN OF CARE
Problem: Patient Care Overview  Goal: Plan of Care/Patient Progress Review  Outcome: Improving  VSS, generalized c/o pain, does have right localized hip pain, tylenol given, echo completed, cards to consult, PT/OT still to see, trending hgb-->most current 7.8, pt total care, oriented only to self, turn Q2, incontinent of both bowel and bladder, continue to monitor.

## 2018-10-13 NOTE — PROVIDER NOTIFICATION
MD Notification    Notified Person: MD    Notified Person Name: Marjan     Notification Date/Time: 10/13/18 6:44 AM     Notification Interaction: Talked with MD    Purpose of Notification: Critical troponin 0.551    Orders Received: MD aware, hospitalist care team to coordinate plan today    Comments:

## 2018-10-13 NOTE — PROVIDER NOTIFICATION
MD Notification    Notified Person: MD    Notified Person Name:  Ashley hospitalist    Notification Date/Time: 10/13, 1455    Notification Interaction:  Text page    Purpose of Notification:  Pt c/o nausea unrelieved with Zofran, can we give him additional antiemtic    Orders Received:  Awaiting orders or call back    Comments:  Ordered PRN EKG, no overt c/o chest pain however.

## 2018-10-13 NOTE — PROGRESS NOTES
Pt VSS on RA. Tele SD w/ BBB. Alert to self only. Bedrest r/t pain , pt incontinent of urine, repo p2h. Given oxycodone for generalized pain. Slept. Plan for PT/OT, ECHO. Continue to monitor.

## 2018-10-13 NOTE — PROGRESS NOTES
SPIRITUAL HEALTH SERVICES Progress Note  [facility]    Attempted to visit pt Salo per pt/family request x2. Pt Salo was vomiting and sick.     SH will visit the pt again on Sunday.     Alma Zhou  Chaplain Resident

## 2018-10-14 NOTE — PROGRESS NOTES
"Clinical Swallow Evaluation:     10/14/18 1600   General Information   Onset Date 10/12/18   Start of Care Date 10/14/18   Referring Physician Gerald Robbins MD   Patient/Family Goals Statement to swallow without pain   Swallowing Evaluation Bedside swallow evaluation   Behaviorial Observations Alert;Confused   Mode of current nutrition Oral diet   Type of oral diet Regular;Thin liquid   Respiratory Status Room air   Comments Pt admitted to [facility] s/p fall at assisted living facility. PMHx includes GERD, CHF, CAD, a-fib and mild dementia. Chest x-ray revealed the lungs were clear. Speech therapy orders recieved per MD as pt with \"difficulty swallowing.\" Granddaughter reports \"gurgly\" sound with swallowing, pt reports \"stabbing\" pain and globus sensation. No Prior speech or GI hx noted.    Clinical Swallow Evaluation   Oral Musculature generally intact   Structural Abnormalities none present   Dentition (somewhat sparse)   Mucosal Quality good   Mandibular Strength and Mobility intact   Oral Labial Strength and Mobility WFL   Lingual Strength and Mobility WFL   Velar Elevation intact   Buccal Strength and Mobility intact   Laryngeal Function Swallow;Voicing initiated;Dry swallow palpated   Additional Documentation Yes   Clinical Swallow Eval: Thin Liquid Texture Trial   Mode of Presentation, Thin Liquids cup;straw;self-fed   Volume of Liquid or Food Presented 2 oz   Oral Phase of Swallow Premature pharyngeal entry  (poor oral control)   Pharyngeal Phase of Swallow feeling of something stuck in throat;reduction in laryngeal movement;repeated swallows;wet vocal quality after swallow   Diagnostic Statement wet vocal quality, pain with swallowing   Clinical Swallow Eval: Nectar Thick Liquid Texture Trial   Mode of Presentation, Nectar cup;straw;self-fed   Volume of Nectar Presented 3 oz   Oral Phase, Nectar Premature pharyngeal entry  (slower oral transit, more control)   Pharyngeal Phase, Nectar reduction in " laryngeal movement;repeated swallows   Diagnostic Statement no overt signs/sx aspiration, pt reports improved comfort   Clinical Swallow Eval: Semisolid Texture Trial   Mode of Presentation, Semisolid self-fed   Volume of Semisolid Food Presented diced peaches   Oral Phase, Semisolid Premature pharyngeal entry   Pharyngeal Phase, Semisolid reduction in laryngeal movement;repeated swallows   Diagnostic Statement pain with swallowing, swallowing up to 4 times with small bite, suspect some aspect of pharyngeal or esophageal residuals   Esophageal Phase of Swallow   Patient reports or presents with symptoms of esophageal dysphagia Yes   Esophageal comments Hx of GERD, globus sensation, belching with intake   General Therapy Interventions   Planned Therapy Interventions Dysphagia Treatment   Dysphagia treatment Modified diet education;Instruction of safe swallow strategies   Intervention Comments Monitor need for video with esophagram?   Swallow Eval: Clinical Impressions   Skilled Criteria for Therapy Intervention Skilled criteria met.  Treatment indicated.   Functional Assessment Scale (FAS) 3   Treatment Diagnosis moderate oropharyngeal dysphagia   Diet texture recommendations Dysphagia diet level 2;Nectar thick liquids   Recommended Feeding/Eating Techniques alternate between small bites and sips of food/liquid;maintain upright posture during/after eating for 30 mins;no straws;small sips/bites  (x2-4 extra swallows as needed)   Demonstrates Need for Referral to Another Service (GI referral possibly)   Therapy Frequency 5 times/wk   Predicted Duration of Therapy Intervention (days/wks) 1 week   Anticipated Discharge Disposition extended care facility   Risks and Benefits of Treatment have been explained. Yes   Patient, family and/or staff in agreement with Plan of Care Yes   Clinical Impression Comments Clinical Swallow Evaluation completed with thin liquids, nectar thick liquids and soft solids. Oral control judged  significantly reduced with thin liquids resulting in audible swallow, suspect premature spillage, odynophagia and wet vocal quality. Control judged improved and swallow more timely with nectar thick liquids. Pt reports increased comfort/decreased pain with same. Pain and globus also noted with solid intake, Pt swallowing x3-4 for each very small, moist bite. Suspect some aspect of pharyngeal or esophageal residual/decreased clearance. Pt also belching frequent which may be indicative of esophageal issues. Recommend downgrade to DD2 (exta moisture, softer, smoother) to assist in pharyngoesophageal clearance and nectar thick liquids given decreased signs/sx aspiration and increased comfort. Swallow strategies: small bites/sips, alternate solids/liquids consistently, allow time for pt to swallow x2-4 times per bite/sip, strict reflux precautions. Will closely monitor clinical tolerance. Pt may benefit from a video swallow study with an esophagram to determine objective information re: swallow function and assist in determining appropriate POC. Pt agreed with diet modifications, considering video swallow.    Total Evaluation Time   Total Evaluation Time (Minutes) 40

## 2018-10-14 NOTE — PROGRESS NOTES
"   10/14/18 1551   Quick Adds   Type of Visit Initial Occupational Therapy Evaluation   Living Environment   Lives With alone   Living Arrangements assisted living   Self-Care   Activity/Exercise/Self-Care Comment Reports he was I with ADL. Walks with a walker, but sometimes gets \"pushed\" in a wheelchair.    Functional Level Prior   Ambulation 1-->assistive equipment   Transferring 1-->assistive equipment   Toileting 1-->assistive equipment   Bathing 1-->assistive equipment   Dressing 0-->independent   General Information   Onset of Illness/Injury or Date of Surgery - Date 10/12/18   Referring Physician Tanya   Patient/Family Goals Statement None stated.   Additional Occupational Profile Info/Pertinent History of Current Problem Admitted after fall sustaining R hip hematoma and is anemic. Demand NSTEMI likely due to anemia. Hgb stable today after transfusion.   Precautions/Limitations fall precautions   Cognitive Status Examination   Orientation person   Level of Consciousness alert   Able to Follow Commands mild impairment   Personal Safety (Cognitive) at risk behaviors demonstrated   Memory impaired   Cognitive Comment Is hard of hearing. Oriented to self only, + sense of humor.    Pain Assessment   Patient Currently in Pain No  (in supine)   Range of Motion (ROM)   ROM Comment WFL, limited in B shoulders.   Mobility   Bed Mobility Comments MOD A   Transfer Skill: Bed to Chair/Chair to Bed   Level of [place]: Bed to Chair minimum assist (75% patients effort)   Transfer Skill: Sit to Stand   Level of [place]: Sit/Stand minimum assist (75% patients effort)   Balance   Balance Comments CGA for standing balance. Minimal c/o pain in standing.   Lower Body Dressing   Level of [place]: Dress Lower Body dependent (less than 25% patients effort)   Eating/Self Feeding   Level of [place]: Eating independent   Activities of Daily Living Analysis   Impairments Contributing to Impaired Activities of " "Daily Living balance impaired;cognition impaired;pain  (pain with movement)   General Therapy Interventions   Planned Therapy Interventions ADL retraining;cognition;transfer training   Clinical Impression   Criteria for Skilled Therapeutic Interventions Met yes, treatment indicated   OT Diagnosis Decreased ADL   Influenced by the following impairments pain in R hip, impaired cognition and balance   Assessment of Occupational Performance 3-5 Performance Deficits   Identified Performance Deficits dressing, bathing, grooming, transfers   Clinical Decision Making (Complexity) Moderate complexity   Therapy Frequency daily   Predicted Duration of Therapy Intervention (days/wks) 3 days   Anticipated Discharge Disposition Transitional Care Facility   Risks and Benefits of Treatment have been explained. Yes   Patient, Family & other staff in agreement with plan of care Yes   [facility] TM \"6 Clicks\"   2016, Trustees of [facility], under license to [employer].  All rights reserved.   6 Clicks Short Forms Daily Activity Inpatient Short Form   [facility]  \"6 Clicks\" Daily Activity Inpatient Short Form   1. Putting on and taking off regular lower body clothing? 2 - A Lot   2. Bathing (including washing, rinsing, drying)? 2 - A Lot   3. Toileting, which includes using toilet, bedpan or urinal? 2 - A Lot   4. Putting on and taking off regular upper body clothing? 3 - A Little   5. Taking care of personal grooming such as brushing teeth? 3 - A Little   6. Eating meals? 4 - None   Daily Activity Raw Score (Score out of 24.Lower scores equate to lower levels of function) 16   Total Evaluation Time   Total Evaluation Time (Minutes) 10     "

## 2018-10-14 NOTE — PROGRESS NOTES
[facility]  Hospitalist Progress Note  Gerald Robbins MD 10/14/2018    Mr Willis is an 88 years old man with history of IHD, ischemic CMP, atrial fibrillation, PVD, dementia, presenting with pain in the right hip, likely post traumatic , unwitnessed fall. Found to have right hip hematoma and anemia. Admitted for inpatient treatment 10/12/18         Assessment and Plan:      Right hip hematoma  Post traumatic in the setting of falls  No fracture on the XRs's and CT   Plan  - serial hemoglobins  -  S/p one unit of blood transfusion.  -  If Hg drops < 7 consider blood transfusion. Threshold of 8 to be considered if he develops concerns for coronary ischemia  - PT/OT     Chest pain  History of ischemic CM s/p CABG 4 v with last cath in 2014 showing patent LIMA to LAD, patent SVG to OM, patent SVG to distal RCA, and SVG to diagonal occluded  S/p ICD  EF of 45-50%  He describes as burning with shortness of breath, per daughter in law they have been wresting with heart failure in the past, but no previous anginal sxs were noted  EKG with possible new ST depressions anteriorly.   Troponin - elevated third test 0.5  Received an aspirin 10/12   Plan  - cardiology  Evaluated , Thanks you, plan is for device interrogation.      Atrial fibrillation  No AC given large fall risks in the past  - continue coreg- rate is controlled   - hold  plavix in setting of hip hematoma , continue aspirin      Possible UTI  Plan  - on ceftriaxone and follow cultures    Dementia  - continue exelon  - palliative care consulted at the request of cardiology.    DVT Prophylaxis: Pneumatic Compression Devices  Code Status: Full Code.      Disposition: Expected discharge in 1 -2 days, pending palliative care input.         Interval History (Subjective):      Denies chest pain or SOB. no abdominal pain. Afebrile. No chills.                   Physical Exam:      Last Vital Signs:  /72 (BP Location: Right arm)  Pulse 85   "Temp 97.4  F (36.3  C) (Oral)  Resp 18  Ht 1.727 m (5' 8\")  Wt 51.1 kg (112 lb 11.2 oz)  SpO2 96%  BMI 17.14 kg/m2    I/O last 3 completed shifts:  In: 1310 [P.O.:710]  Out: -     Constitutional: Frail man, in NAD comfortably lying in bed   Respiratory: CTA no crackles or wheezing, good air entry bilatrealy   Cardiovascular: RRR  III/VI systolic murmur noted   Abdomen: Soft NT/ND Positive bowel sounds   Skin: Warm dry no rahses   :           Medications:      All current medications were reviewed with changes reflected in problem list.         Data:      All new lab and imaging data was reviewed.   Labs:    Recent Labs  Lab 10/14/18  0600 10/13/18  0556 10/12/18  1656    145* 141   POTASSIUM 4.5 4.3 4.4   CHLORIDE 109 113* 109   CO2 27 26 26   ANIONGAP 5 6 6   * 119* 132*   BUN 41* 44* 51*   CR 0.83 0.85 1.04   GFRESTIMATED 88 85 67   GFRESTBLACK >90 >90 82   LUCIEN 7.9* 8.1* 8.0*       Recent Labs  Lab 10/12/18  2005   NTBNPI 1857*       Recent Labs  Lab 10/14/18  0600 10/14/18  0030 10/13/18  1814  10/13/18  0556  10/12/18  1656   WBC  --   --   --   --  11.7*  --  11.8*   HGB 8.8* 9.3* 7.8*  < > 8.5*  < > 8.5*   HCT  --   --   --   --  25.3*  --  25.5*   MCV  --   --   --   --  97  --  97   PLT  --   --   --   --  152  --  140*   < > = values in this interval not displayed.  No results for input(s): TSH in the last 168 hours.   Imaging:   Recent Results (from the past 24 hour(s))   CT Hip Right w/o Contrast    Narrative    CT RIGHT HIP WITHOUT CONTRAST   10/12/2018 5:49 PM    HISTORY: Right hip pain after fall on Wednesday.    COMPARISON: None.    TECHNIQUE: Routine CT imaging is performed through the right hip  without contrast. Radiation dose for this scan was reduced using  automated exposure control, adjustment of the mA and/or kV according  to patient size, or iterative reconstruction technique.     FINDINGS: No fracture or osseous lesion is seen. There may be mild  joint space loss throughout " the hip joint. There is prominent  calcification in the vascular structures. There is calcification  within the prostate gland. There are scattered diverticula of the  visualized colon with no evidence of diverticulitis. No other soft  tissue abnormality is seen.      Impression    IMPRESSION: No fracture or other evidence of recent injury.     TANIKA ABREU MD   XR Femur Right 2 Views    Narrative    RIGHT FEMUR TWO VIEWS  10/12/2018 7:28 PM     HISTORY: Pain. Unwitnessed fall.    COMPARISON: None.    FINDINGS: No fracture or dislocation is identified. There are  degenerative changes at the hip and knee joint. Severe vascular  calcifications are present. Clips are present throughout the leg,  likely related to prior vascular surgery.      Impression    IMPRESSION: No fracture identified.    ACE SOLORIO MD   CT Chest/Abdomen/Pelvis w Contrast    Narrative    CT CHEST, ABDOMEN, AND PELVIS WITH CONTRAST  10/12/2018 7:57 PM    HISTORY: Fall, dementia, unclear source of pain and/or potential  bleeding. Unwitnessed fall. Anemic today. Hemoglobin 8.5.    COMPARISON: None.    TECHNIQUE: Routine transverse CT imaging of the chest, abdomen, and  pelvis was performed following the uneventful administration of 61mL  Isovue-370 intravenous contrast. Radiation dose for this scan was  reduced using automated exposure control, adjustment of the mA and/or  kV according to patient size, or iterative reconstruction technique.    FINDINGS:     Chest: The heart size is moderately enlarged. There are surgical  changes of a median sternotomy and presumed coronary bypass surgery.  No enlarged lymph node or other abnormal mediastinal mass is seen.  There is calcification within the thoracic aorta. No other vascular  abnormality is seen. There is mild atelectasis in both lung bases. No  pneumothorax is demonstrated. No pleural effusion is identified. There  are degenerative changes in the spine. No other osseous abnormality  is  seen. No chest wall pathology is seen.     Abdomen and pelvis: The liver is unremarkable. There is an  approximately 2 cm cyst within the spleen. The pancreas, gallbladder,  and left adrenal gland are unremarkable. The right adrenal gland is  not seen but there is extensive surgical metal artifact in this  region. There is a 2.0 cm cyst of the lateral aspect of the right  kidney with a 1.0 cm cyst in the inferior pole. There is a 3.2 cm  calculus within the left renal pelvis with numerous additional smaller  calculi elsewhere within the collecting system of the left kidney. The  left kidney is moderately atrophic with prominent likely chronic  hydronephrosis. No other urinary tract abnormality is seen. No  enlarged lymph node or other abnormal mass is demonstrated. No free  fluid is seen. No free intraperitoneal gas is identified. The  gastrointestinal tract is unremarkable. The appendix is not  identified. There is no additional evidence of appendicitis. There is  enlargement of the prostate gland. There is an endovascular  aortobiiliac stent. There is calcification elsewhere in the vascular  structures. There are degenerative changes in the spine. No other  osseous abnormality is seen. There is soft tissue fullness posterior  to the right hip. This includes what appears to be an approximately 7  cm round area of more increased density within the posterior thigh  musculature. No other adjacent soft tissue abnormality is seen.       Impression    IMPRESSION:   1. There is relative soft tissue prominence in the muscles posterior  to the right hip. This is suspicious for a hematoma.  2. There is a 3.2 cm calculus within the left renal pelvis with  numerous smaller calculi elsewhere in the kidney. There is prominent  left hydronephrosis which is likely chronic with left renal atrophy.    TANIKA ABREU MD   Head CT w/o contrast    Narrative    CT SCAN OF THE HEAD WITHOUT CONTRAST   10/12/2018 7:58 PM      HISTORY: Fall. AMS.    TECHNIQUE: Axial images of the head and coronal reformations without  IV contrast material. Radiation dose for this scan was reduced using  automated exposure control, adjustment of the mA and/or kV according  to patient size, or iterative reconstruction technique.    COMPARISON: None.    FINDINGS: Moderate volume loss is present. Patchy white matter  hypoattenuation likely represents chronic small vessel ischemic  change. There is geographic hypoattenuation within the left parietal  lobe with lack of gray-white differentiation and likely associated  cortical volume loss. No evidence of intracranial hemorrhage. The  visualized calvarium, skull base, paranasal sinuses, and extracranial  soft tissues are unremarkable. Bilateral lens replacements are  present.      Impression    IMPRESSION:   1. Left parietal lobe infarct, likely chronic.  2. No evidence of intracranial hemorrhage.    ACE SOLORIO MD   XR Chest Port 1 View    Narrative    CHEST ONE VIEW PORTABLE  10/12/2018 11:54 PM     HISTORY:  Chest pain.    COMPARISON: None.      Impression    IMPRESSION: Cardiac device left chest wall, with lead tips projected  over the RA and RV. Prior midline sternotomy with postoperative  changes in the heart. Lungs clear. Aortic calcification. Pulmonary  vascularity is within normal limits.    BRII FARAH MD

## 2018-10-14 NOTE — PLAN OF CARE
Problem: Patient Care Overview  Goal: Plan of Care/Patient Progress Review  Discharge Planner OT   Patient plan for discharge: None stated today.  Current status: Eval complete and treatment initiated. Patient able to stand and ambulate around bed with MIN A and walker, c/o very little pain. DEP for dressing.   Barriers to return to prior living situation: Level of A need with mobility and ADL. Fall risk.  Recommendations for discharge: TCU.  Rationale for recommendations: Not safe to return home to [facility] unless they can A him with ADL and mobility 24/7.        Entered by: Geno Jacob 10/14/2018 4:00 PM

## 2018-10-14 NOTE — PLAN OF CARE
"Problem: Patient Care Overview  Goal: Plan of Care/Patient Progress Review  Discharge Planner SLP   Patient plan for discharge: did not state  Current status:  Speech therapy orders recieved per MD as pt with \"difficulty swallowing.\" Granddaughter reports \"gurgly\" sound with swallowing, pt reports \"stabbing\" pain and globus sensation. No Prior speech noted. Clinical Swallow Evaluation completed with thin liquids, nectar thick liquids and soft solids. Oral control judged significantly reduced with thin liquids resulting in audible swallow, suspect premature spillage, odynophagia and wet vocal quality. Control judged improved and swallow more timely with nectar thick liquids. Pt reports increased comfort/decreased pain with same. Pain and globus also noted with solid intake, Pt swallowing x3-4 for each very small, moist bite. Suspect some aspect of pharyngeal or esophageal residual/decreased clearance. Pt also belching frequent which may be indicative of esophageal issues. Recommend downgrade to DD2 (exta moisture, softer, smoother) to assist in pharyngoesophageal clearance and nectar thick liquids given decreased signs/sx aspiration and increased comfort. Swallow strategies: no straws, small bites/sips, alternate solids/liquids consistently, allow time for pt to swallow x2-4 times per bite/sip, strict reflux precautions. Will closely monitor clinical tolerance. Pt may benefit from a video swallow study with an esophagram to determine objective information re: swallow function and assist in determining appropriate POC. Pt agreed with diet modifications, considering video swallow.   Barriers to return to prior living situation: medical status/needs  Recommendations for discharge: return to assisted living, may need speech follow up pending progress  Rationale for recommendations: ongoing swallow intervention needed d/t below baseline diet, safety with PO, strategy reinforcement       Entered by: Araceli Mckinley 10/14/2018 " 4:57 PM

## 2018-10-14 NOTE — PROGRESS NOTES
"SPIRITUAL HEALTH SERVICES Progress Note  [facility]    Visit per request for HCD.   had conversation with pt after reviewing chart: \"history of dementia.\"   determined that it is not appropriate to complete HCD with pt at this time due to dementia.      Adriana Rowe  Chaplain Resident  "

## 2018-10-14 NOTE — PLAN OF CARE
"Problem: Patient Care Overview  Goal: Plan of Care/Patient Progress Review  Outcome: Improving  Pt VSS on RA. Tele afib cvr. Alert to self. Pt total cares, turning approximately p2h, pt incontinent of urine. Pt N/V for majority of shift, given zofran, compazine, ginger ale, lavender, cool cloths, and scop patch. Pt stated that he \"feels lousy and miserable\". Pt made several statements \"just let me go, I just want it to end\". Pt eventually was able to fall asleep. C/o generalized pain given oxycodone and tylenol with partial relief. Gave 1 unit packed RBC. Plan to continue to trend Hgbs, device to be interrogated. Continue to monitor.      "

## 2018-10-14 NOTE — PROVIDER NOTIFICATION
MD Notification    Notified Person: MD    Notified Person Name: Moe    Notification Date/Time: 10/13/18 10:12 PM     Notification Interaction: Talked with MD    Purpose of Notification: Pt N/V, given zofran and compazine with no relief, reglan?     Orders Received: increase compazine    Comments:

## 2018-10-14 NOTE — PROVIDER NOTIFICATION
MD Notification    Notified Person: MD    Notified Person Name: Moe    Notification Date/Time: 10/14/18 12:27 AM     Notification Interaction: Talked with MD    Purpose of Notification: Pt still nauseous    Orders Received: Scopolamine patch    Comments:

## 2018-10-15 NOTE — PLAN OF CARE
Problem: Patient Care Overview  Goal: Plan of Care/Patient Progress Review    Discharge Planner SLP   Patient plan for discharge: Did not state  Current status: Swallow Tx was provided this am.  Patient demonstrated decreased appetite, delayed swallows with decreased elevation, belching, and intermittent pain after swallows during limited po trials.  Given swallow delay, a liquid upgrade is not recommended at this time.   A diet upgrade is also not recommended due to esophageal dysphagia signs.  Consider a GI consult for esophageal dysphagia symptoms which appear to be limiting po intake.  A video swallow study with esophagram is another option for further assessment.  Note palliative care consult also placed.  Will defer to palliative care/patient/family on evaluations and level of diet/liquid modification desired.  Safest diet/liquid options are this time continue to be dysphagia diet level 2 and nectar thick liquids with precautions (no straws, small bites/sips, alternate solids/liquids consistently, allow time for pt to swallow x2-4 times per bite/sip, strict reflux precautions).  Plan to continue Tx at bedside to assess diet tolerance and train strategies.  Barriers to return to prior living situation: Level of assist  Recommendations for discharge: TCU with SLP swallow Tx  Rationale for recommendations: SLP swallow Tx to maximize safety for a least restrictive diet       Entered by: Michell Landin 10/15/2018 10:45 AM

## 2018-10-15 NOTE — PLAN OF CARE
"Problem: Patient Care Overview  Goal: Plan of Care/Patient Progress Review  OT: Attempted to see however patient was sound asleep. Woke him up and he said \"do I have to?\" and quickly fell back to sleep.      "

## 2018-10-15 NOTE — PLAN OF CARE
Problem: Patient Care Overview  Goal: Plan of Care/Patient Progress Review  Discharge Planner OT   Patient plan for discharge: None stated today.  Current status: Able to stand for ~1 minute with walker and MOD A initially before requesting to sit due to fatigue. Knees buckling initially. Completed grooming seated, unable to progress to sink or standing to complete today.   Barriers to return to prior living situation: Needs A with ADL and mobility.  Recommendations for discharge: TCU.  Rationale for recommendations: Has been sleeping all day, is too tired to try second attempt at standing. Will need to work on activity tolerance, strength and mobility for ADL at TCU.       Entered by: Geno Jacob 10/15/2018 4:42 PM

## 2018-10-15 NOTE — PROVIDER NOTIFICATION
MD Notification    Notified Person: MD    Notified Person Name: Rosendo    Notification Date/Time: 10/14/18 @ 1030    Notification Interaction: Text paged    Purpose of Notification: Pt c/o severe epigastric pain. Son is present and said Maalox really help yesterday.     Orders Received: Order received for Maalox    Comments:

## 2018-10-15 NOTE — PLAN OF CARE
Problem: Patient Care Overview  Goal: Plan of Care/Patient Progress Review  Outcome: No Change   Alert, oriented to self only. VSS on RA. Tele A fib CVR with BBB. Denies chest pain. Confused, pulled off PIV x1. On Cardiac/DD2 diet with nectar thick liquid. No c/o nausea this shift. Scop patch in place. Right hip soreness, managed with repositioning q2hrs. Incontinent of urine. Last Hgb 8.9. On serial Hgb check q6hrs. Rechecking troponin level this AM. Palliative care consulted. SLP following. Discharge pending progress.

## 2018-10-15 NOTE — PLAN OF CARE
Problem: Patient Care Overview  Goal: Plan of Care/Patient Progress Review  Discharge Planner PT   Patient plan for discharge: Not stated  Current status:     Eval complete, treatment initiated. RN approved session. Vitals monitored throughout. Pt demonstrates dec tolerance to OOB activity, c/o R groin pain. Pt performed 10 reps AP and AAROM heel slides with in tolerance. Pt demo impaired BLE knee extension lacking ~ 25* not measured with goni. Pt is [place]. Supine > sit with mod A x 1. Pt sat EOB for total ~ 5 min during session, requires BUE support to maintain balance. Focus on postural correction. Pt initially demonstrating strong lean to the L, able to correct with cues. Pt performed STS x 1 with FWW and mod A x 1. Pt unable to obtain full upright posture, unable to engage in pre gait activity, BLE buckling noted. Pt stood < 30 seconds. Reported dizziness, /48 HR 82. O2 96%. Sit > supine with max A x 1. Total A x 2 for positioning in bed. Left with RN present end of session  Barriers to return to prior living situation: A x 1-2 for mobility, limited tolerance to OOB activity   Recommendations for discharge: TCU   Rationale for recommendations: Pt will require TCU stay as pt requires assist with all mobility at this time, has significant fall history, and demonstrates impaired tolerance to OOB activity. If pt returns to [facility] will need 24/7 assist with mobility and HHPT to progress functional mobility          Entered by: Davida Platt 10/15/2018 9:54 AM

## 2018-10-15 NOTE — PROGRESS NOTES
[facility]    Hospitalist Progress Note    Assessment & Plan   Salo Willis is a 88 year old male who was admitted on 10/12/2018.     Past medical history significant for coronary artery disease, hypertension, hyperlipidemia, ischemic cardiomyopathy, chronic atrial fibrillation, PVD, dementia who was admitted to previous  the [facility] due to hematoma of the right hip following a suspected mechanical fall.      Right hip hematoma thought to be secondary to mechanical fall:  -Follow with unwitnessed.  No fractures are noted on x-rays or CT scan.  -Patient is received 1 unit of packed red blood cells.  ASSESSMENT: Stable.    PLAN:   -Monitor hemoglobin.  -PT/OT.  -Conditional blood transfusion order in place if hemoglobin drops below 7.  -Continue PTA ASA.      Anemia thought to be secondary to acute blood loss:  -Patient has received 1 unit of packed red blood cells.  ASSESSMENT: Stable with hemoglobin at 8.9.    PLAN:   -Monitor hemoglobin.  -Conditional blood transfusions as stated above.  -Continue aspirin therapy.    Type II NSTEMI in the setting of known coronary artery disease status post 4 vessel CABG with LIMA to the LAD, SVG to the OM, SVG to the distal RCA, SVG to the diagonal (known to be occluded from coronary angiogram performed in 2014) with associated ischemic cardiomyopathy s/p ICD placement, hypertension, hyperlipidemia:  -EKG with atrial fibrillation, right bundle branch block, Q waves noted in leads II, III and aVF.  -Troponin: 0.069-->0.551-->0.711-->0.693-->0.174.  -Echocardiogram demonstrated severely dilated left ventricle with an EF of 50-55% and global hypokinetic with abnormal septal wall motion consistent with conduction abnormalities otherwise no specific specific regional wall motion abnormalities are noted.  ASSESSMENT: Stable, patient without chest pain.    PLAN:   -Appreciate cardiology consult: Plan for interrogation of ICD.   -Plan to proceed with medical  "management, avoid heparin and Plavix given his propensity for bleeding and noted anemia.    -Continue aspirin 81 mg daily, Carvedilol 3.125 mg 2 times daily, lisinopril 2.5 mg daily.  -Family has had a long discussion and would like to avoid invasive procedures and have also indicated that they will change his CODE STATUS to DNR/DNI and are also considering returning of his ICD.      Chronic atrial fibrillation:  ASSESSMENT: Stable.    PLAN:   -Continue Coreg and aspirin therapy as above.  -Monitor on telemetry.    Possible urinary tract infection:  -Urine analysis with noted large Leukocyte Estrace, small amount of blood, white blood cells of 153 and hyaline casts of 13.  ASSESSMENT: Urine culture with mixed urogenital lisa less than 10,000 colonies.  PLAN:   -Discontinue antibiotic therapy.    Dementia:  ASSESSMENT: Assume patient is at baseline; though he was unable to remain awake.    PLAN:   -Continue Exelon.  -Palliative care consult requested.    Dysphagia:  Likely secondary to progression of dementia.    ASSESSMENT:  SLP recommends Gi consult versus video swallow with esophogram.  Dysphagia diet level 2 with nectar thickened liquids.    PLAN:  -Await recommendations/outcome of palliative care assessment.      DVT Prophylaxis: Pneumatic Compression Devices  Code Status: DNR/DNI    Disposition: Expected discharge unclear, likely several days.  Patient and family plan to meet with palliative care team later this afternoon.      The patient has been discussed with Dr. Santizo, who agrees with the assessment and plan at this time.  Dr. Santizo will evaluate the patient independently.      Deandre Polanco PA-C   [phone]    Interval History   Patient asleep upon arrival.  He awakened briefly and stated he was \"ok\".  He was able to deny chest pain and shortness of breath before falling back to sleep.      -Data reviewed today: I reviewed all new labs and imaging results over the last 24 hours. I personally " reviewed no images or EKG's today.    Physical Exam   Temp: 98.2  F (36.8  C) Temp src: Oral BP: 98/52   Heart Rate: 90 Resp: 18 SpO2: 92 % O2 Device: None (Room air)    Vitals:    10/12/18 1642 10/13/18 0513 10/14/18 0500   Weight: 63.5 kg (140 lb) 51.6 kg (113 lb 12.8 oz) 51.1 kg (112 lb 11.2 oz)     Vital Signs with Ranges  Temp:  [97.2  F (36.2  C)-98.3  F (36.8  C)] 98.2  F (36.8  C)  Heart Rate:  [66-90] 90  Resp:  [16-20] 18  BP: ()/(37-78) 98/52  SpO2:  [82 %-96 %] 92 %  I/O last 3 completed shifts:  In: 180 [P.O.:180]  Out: 100 [Urine:100]      Constitutional: Asleep, unable to maintain wakefulness but cooperative when awake, no apparent distress.   ENT: Normocephalic, without obvious abnormality, atraumatic, oral pharynx with dry mucus membranes, tonsils without erythema or exudates.  Neck: Supple, symmetrical, trachea midline, no adenopathy.  Pulmonary: No increased work of breathing, good air exchange, clear to auscultation bilaterally, no crackles or wheezing.  Cardiovascular: Regular rate and rhythm, normal S1 and S2, no S3 or S4, and no murmur noted.  GI: Normal bowel sounds, soft, non-distended, non-tender.  Skin/Integumen: Clear.  Neuro: CN II-XII grossly intact.  Psych:  Normal affect.  Extremities: No lower extremity edema noted, and non-TTP bilaterally.       Medications       aspirin  81 mg Oral Daily     atorvastatin  10 mg Oral Daily     carvedilol  3.125 mg Oral BID     cefTRIAXone  1 g Intravenous Q24H     cholecalciferol  2,000 Units Oral Daily     docusate sodium  100 mg Oral QPM     influenza Vac Split High-Dose  0.5 mL Intramuscular Prior to discharge     lisinopril  2.5 mg Oral Daily     mirtazapine  7.5 mg Oral At Bedtime     ranitidine  150 mg Oral At Bedtime     rivastigmine  1.5 mg Oral BID w/meals     scopolamine  1 patch Transdermal Q72H    And     scopolamine   Transdermal Q8H    And     [START ON 10/17/2018] scopolamine   Transdermal Q72H     traZODone  50 mg Oral At  Bedtime       Data     Recent Labs  Lab 10/15/18  0615 10/15/18  0005 10/14/18  1751  10/14/18  0600  10/13/18  1245 10/13/18  1033 10/13/18  0556  10/12/18  1656   WBC  --   --   --   --   --   --   --   --  11.7*  --  11.8*   HGB 8.9* 8.9* 8.8*  < > 8.8*  < > 7.8*  --  8.5*  < > 8.5*   MCV  --   --   --   --   --   --   --   --  97  --  97   PLT  --   --   --   --   --   --   --   --  152  --  140*   INR  --   --   --   --   --   --   --   --   --   --  1.23*   NA  --   --   --   --  141  --   --   --  145*  --  141   POTASSIUM  --   --   --   --  4.5  --   --   --  4.3  --  4.4   CHLORIDE  --   --   --   --  109  --   --   --  113*  --  109   CO2  --   --   --   --  27  --   --   --  26  --  26   BUN  --   --   --   --  41*  --   --   --  44*  --  51*   CR  --   --   --   --  0.83  --   --   --  0.85  --  1.04   ANIONGAP  --   --   --   --  5  --   --   --  6  --  6   LUCIEN  --   --   --   --  7.9*  --   --   --  8.1*  --  8.0*   GLC  --   --   --   --  108*  --   --   --  119*  --  132*   ALBUMIN  --   --   --   --   --   --   --   --  2.3*  --   --    PROTTOTAL  --   --   --   --   --   --   --   --  5.8*  --   --    BILITOTAL  --   --   --   --   --   --   --   --  0.2  --   --    ALKPHOS  --   --   --   --   --   --   --   --  74  --   --    ALT  --   --   --   --   --   --   --   --  12  --   --    AST  --   --   --   --   --   --   --   --  15  --   --    TROPI 0.174*  --   --   --   --   --  0.693* 0.711* 0.551*  < > 0.017   < > = values in this interval not displayed.    No results found for this or any previous visit (from the past 24 hour(s)).

## 2018-10-15 NOTE — PROGRESS NOTES
[facility]    Cardiology Progress Note     Assessment & Plan   Mr. Willis is a very pleasant 88-year-old male with a past medical history of coronary artery disease status post CABG with LIMA to the LAD, SVG to the OM, SVG to the distal RCA, and SVG to the diagonal (known to be occluded from coronary angiogram in 2014) also with a mild history of ischemic cardiomyopathy and chronic atrial fibrillation, not on anticoagulation due to recurrent falls, who was admitted after another fall in his assisted living facility. Because of the fall troponins were checked to rule out ACS, and were elevated. Troponin did peak at 0.7. Electrocardiogram illustrated atrial fibrillation, right bundle branch block, and Q waves in II, III, and aVF. Transthoracic echocardiogram illustrated a severely dilated left ventricle (LVEDD 68 mm), with an ejection fraction of 50-55%.  The ventricle was globally hypokinetic with abnormal septal wall motion consistent with conduction abnormalities, but there are no other specific regional wall motion abnormalities. There is only mild mitral valve regurgitation. There is mild to moderate aortic regurgitation.    After a unit of RBCs yesterday, Mr. Willis felt improved. Currently, he experiences a burning sensation with eating, associated with nausea. He describes food getting stuck in his throat. Would recommend a dysphagia evaluation from the Internal Medicine standpoint. Otherwise, he was without complaint today during my evaluations.    #1 NSTEMI, consistent with probable Type II NSTEMI/demand ischemia  #2 Anemia  #3 Nausea/vomiting  #4 Recurrent falls  #5 Chronic atrial fibrillation, not on anticoagulation secondary to #4  I had a long discussion with Mr. Willis's son and daughter, and discussed the NSTEMI, most likely demand ischemia. They would like to avoid invasive procedures, and after a long discussion, they also decided to change his code status to DNR/DNI. They are still  considering turning off his ICD. They are interested in a Palliative Care consult, which I believe would be very appropriate.    We will continue to treat his CAD and ACS medically, avoiding heparin and Plavix though given his propensity for bleeding and anemia. We will continue aspirin 81 mg, carvedilol 3.125 mg BID, and lisinopril 2.5 mg daily. Lasix can be utilized if volume overloaded, but currently is struggling with orthostatic hypotension.    Jaja Hensley MD    Interval History   Received 1 unit of RBCs, which improved symptoms. Denied nausea this morning, but burning discomfort returned this afternoon after eating.    Physical Exam   Temp: 98.3  F (36.8  C) Temp src: Oral BP: 120/50   Heart Rate: 79 Resp: 18 SpO2: 96 % O2 Device: None (Room air)    Vitals:    10/12/18 1642 10/13/18 0513 10/14/18 0500   Weight: 63.5 kg (140 lb) 51.6 kg (113 lb 12.8 oz) 51.1 kg (112 lb 11.2 oz)     Vital Signs with Ranges  Temp:  [97.2  F (36.2  C)-98.3  F (36.8  C)] 98.3  F (36.8  C)  Heart Rate:  [66-87] 79  Resp:  [16-20] 18  BP: ()/(37-78) 120/50  SpO2:  [82 %-96 %] 96 %  I/O last 3 completed shifts:  In: 420 [P.O.:120]  Out: -   Patient Active Problem List   Diagnosis     S/P TURP     S/P CABG (coronary artery bypass graft)     S/P AAA repair     Prostate cancer (H)     Inguinal hernia     Hyperlipidemia     GERD (gastroesophageal reflux disease)     CHF (congestive heart failure) (H)     Carotid artery disease (H)     Cardiomyopathy (H)     CAD (coronary artery disease)     Atrial fibrillation (H)     AICD (automatic cardioverter/defibrillator) present     Abdominal aortic aneurysm (H)     Paroxysmal VT (H)     Hematoma      Constitutional: Patient appearing in mild distress, with nausea and overall pain secondary to recent falls  HEENT: Moist oral mucosa  Respiratory: Decreased respiratory effort with reduced breath sounds bilaterally   Cardiovascular: JVP not significantly elevated. No RV heave, and PMI not  laterally displaced. Regular rate and rhythm. Normal S1 and S2.  2 out of 6 systolic murmur heard at the right and left upper sternal borders  Abdomen: Soft, nontender/nondistended normoactive bowel sounds  Skin: No stasis dermatitis.   Extremities: Trace lower extremity edema.  Neurologic: Moving all extremities. No facial assymmetry.  Psychiatric: Alert and oriented. Answers questions appropriately.    Medications       aspirin  81 mg Oral Daily     atorvastatin  10 mg Oral Daily     carvedilol  3.125 mg Oral BID     cefTRIAXone  1 g Intravenous Q24H     cholecalciferol  2,000 Units Oral Daily     docusate sodium  100 mg Oral QPM     influenza Vac Split High-Dose  0.5 mL Intramuscular Prior to discharge     lisinopril  2.5 mg Oral Daily     mirtazapine  7.5 mg Oral At Bedtime     ranitidine  150 mg Oral At Bedtime     rivastigmine  1.5 mg Oral BID w/meals     scopolamine  1 patch Transdermal Q72H    And     scopolamine   Transdermal Q8H    And     [START ON 10/17/2018] scopolamine   Transdermal Q72H     traZODone  50 mg Oral At Bedtime       Data   Results for orders placed or performed during the hospital encounter of 10/12/18 (from the past 24 hour(s))   Hemoglobin   Result Value Ref Range    Hemoglobin 8.8 (L) 13.3 - 17.7 g/dL   Basic metabolic panel   Result Value Ref Range    Sodium 141 133 - 144 mmol/L    Potassium 4.5 3.4 - 5.3 mmol/L    Chloride 109 94 - 109 mmol/L    Carbon Dioxide 27 20 - 32 mmol/L    Anion Gap 5 3 - 14 mmol/L    Glucose 108 (H) 70 - 99 mg/dL    Urea Nitrogen 41 (H) 7 - 30 mg/dL    Creatinine 0.83 0.66 - 1.25 mg/dL    GFR Estimate 88 >60 mL/min/1.7m2    GFR Estimate If Black >90 >60 mL/min/1.7m2    Calcium 7.9 (L) 8.5 - 10.1 mg/dL   EKG 12-lead, tracing only   Result Value Ref Range    Interpretation ECG Click View Image link to view waveform and result    Hemoglobin   Result Value Ref Range    Hemoglobin 9.0 (L) 13.3 - 17.7 g/dL   Hemoglobin   Result Value Ref Range    Hemoglobin 8.8  (L) 13.3 - 17.7 g/dL   Hemoglobin   Result Value Ref Range    Hemoglobin 8.9 (L) 13.3 - 17.7 g/dL

## 2018-10-15 NOTE — PLAN OF CARE
Problem: Patient Care Overview  Goal: Plan of Care/Patient Progress Review  Outcome: No Change  Alert to self, loosely to time; disoriented to month, place, situation. Pleasant and mostly cooperative. Needs encouragement for activity. Up with A1-2 with WW. Poor appetite with DD2 + NTL; dislikes thickened liquids and difficulty tolerating lgr amts of food d/t associated gastric pain. Disliked pills crushed in applesauce, able to swallow pill when given 1/time. Mild gastric improvement with GI cocktail. R hip pain present with activity, PRN APAP given with improvement. Serial hg 8.9 to 8.4 @ noon. Now SR with BBB on tele. Palliative consult completed, to proceed with GI workup and esophogram tomorrow @ 0900 before further decisions made re: POC. Son Kenny BENSON, wife Geno heavily involved in decision making.

## 2018-10-15 NOTE — PROGRESS NOTES
ADDENDUM:    Following Palliative care meeting family and patient have requested to proceed with work-up regarding dysphagia/odynophagia.    --SLP will perform video swallow and esophogram xray has been ordered.    --GI consult requested.   Unclear if patient and family would be agreeable to EGD but want to have recommendations and discussion.      Deandre Polanco PA-C

## 2018-10-15 NOTE — PROGRESS NOTES
[facility]  Cardiology Progress Note    Date of Service: 10/15/2018       Patient being seen by cardiology in follow up of NSTEMI.       Interval history:  Noted pt now DNR/DNI, palliative care consult pending.   Family has voiced wishes to avoid invasive procedures.   Limited evaluation today as he was seen early this AM by cardiology resident.     Discussed with available staff at bedside, chart reviewed, patient seen and examined.      ASSESSMENT/PLAN:    1. NSTEMI.   --Hx CAD s/p CABG with LIMA to the LAD, SVG to the OM, SVG to the distal RCA, and SVG to the diagonal (known to be occluded from coronary angiogram in 2014) and hx mild ischemic cardiomyopathy. EF 50-55% with significantly dilated LV on echo this admit.    --Suspected demand ischemia, mild trop elevation peak 0.711, trended down. Conservative mgmt planned with no invasive procedures. Palliative care evaluation pending. He remains free of chest pain.    --Continue medical mgmt; ASA 81mg daily, atorvastatin 10mg daily, carvedilol 3.125mg BID, lisinopril 2.5mg daily. BP under good control currently.      2. Afib.   --Rate controlled afib with bundle branch block, on carvedilol.    --No plans for AC due to recent falls and will avoid heparin and plavix as well given his issues with anemia. Noted family is also considering turning off his ICD.       HPI:   Salo Willis is a 88 year old male with past medical history significant for dementia, afib and CAD admitted on 10/12/2018 with hip pain after a recent mechanical fall. Mild troponin elevation, peak at 0.711. Echo with dilated LV and EF 50-55%. Acute on chronic issues with anemia as well which is being treated.       Patient Active Problem List   Diagnosis     S/P TURP     S/P CABG (coronary artery bypass graft)     S/P AAA repair     Prostate cancer (H)     Inguinal hernia     Hyperlipidemia     GERD (gastroesophageal reflux disease)     CHF (congestive heart failure) (H)      "Carotid artery disease (H)     Cardiomyopathy (H)     CAD (coronary artery disease)     Atrial fibrillation (H)     AICD (automatic cardioverter/defibrillator) present     Abdominal aortic aneurysm (H)     Paroxysmal VT (H)     Hematoma       Subjective:  Denies CP, palpitations. Says he did get mildly dizzy when up with PT.  Somewhat confused, talking about people giving dogs away.       Objective:  Vital signs:  /46  Pulse 85  Temp 98.2  F (36.8  C) (Oral)  Resp 18  Ht 1.727 m (5' 8\")  Wt 51.1 kg (112 lb 11.2 oz)  SpO2 96%  BMI 17.14 kg/m2    Intake/Output Summary (Last 24 hours) at 10/15/18 1324  Last data filed at 10/15/18 0635   Gross per 24 hour   Intake               60 ml   Output              100 ml   Net              -40 ml       Vitals:    10/12/18 1642 10/13/18 0513 10/14/18 0500   Weight: 63.5 kg (140 lb) 51.6 kg (113 lb 12.8 oz) 51.1 kg (112 lb 11.2 oz)       PE:  Gen: In general, this is an elderly  male, somewhat pale appearing. Resting in bed,  in no acute distress.   Neck: Supple with midline trachea. No significant JVD.  CV: Irregular rhythm. No murmur or rub appreciated.   Resp:Respirations are unlabored without use of accessory muscles. Auscultation of the lungs reveals clear lung fields bilaterally without wheezes or rhonchi.   GI: Soft, nontender, nondistended. BS present.   Extrem: Extremities are warm, without cyanosis or clubbing. No lower extremity edema.   Neuro: Patient a bit sleepy but does wake to answer my questions. Somewhat confused to situation. No obvious focal abnormalities.  Skin: No visible rashes. Clean and dry.     Current Medications:?    aspirin  81 mg Oral Daily     atorvastatin  10 mg Oral Daily     carvedilol  3.125 mg Oral BID     cholecalciferol  2,000 Units Oral Daily     docusate sodium  100 mg Oral QPM     influenza Vac Split High-Dose  0.5 mL Intramuscular Prior to discharge     lisinopril  2.5 mg Oral Daily     mirtazapine  7.5 mg Oral At " Bedtime     ranitidine  150 mg Oral At Bedtime     rivastigmine  1.5 mg Oral BID w/meals     scopolamine  1 patch Transdermal Q72H    And     scopolamine   Transdermal Q8H    And     [START ON 10/17/2018] scopolamine   Transdermal Q72H     traZODone  50 mg Oral At Bedtime         Labs/Imaging/Additional testing:  The following labs and imaging were reviewed during today's visit:      Recent Labs  Lab 10/15/18  0615 10/13/18  1245 10/13/18  1033 10/13/18  0556 10/12/18  2355   TROPI 0.174* 0.693* 0.711* 0.551* 0.069*         Recent Labs  Lab 10/15/18  1233 10/15/18  0615 10/15/18  0005  10/14/18  0600  10/13/18  1245 10/13/18  1033 10/13/18  0556  10/12/18  1656   WBC  --   --   --   --   --   --   --   --  11.7*  --  11.8*   HGB 8.4* 8.9* 8.9*  < > 8.8*  < > 7.8*  --  8.5*  < > 8.5*   MCV  --   --   --   --   --   --   --   --  97  --  97   PLT  --   --   --   --   --   --   --   --  152  --  140*   INR  --   --   --   --   --   --   --   --   --   --  1.23*   NA  --   --   --   --  141  --   --   --  145*  --  141   POTASSIUM  --   --   --   --  4.5  --   --   --  4.3  --  4.4   CHLORIDE  --   --   --   --  109  --   --   --  113*  --  109   CO2  --   --   --   --  27  --   --   --  26  --  26   BUN  --   --   --   --  41*  --   --   --  44*  --  51*   CR  --   --   --   --  0.83  --   --   --  0.85  --  1.04   GFRESTIMATED  --   --   --   --  88  --   --   --  85  --  67   GFRESTBLACK  --   --   --   --  >90  --   --   --  >90  --  82   ANIONGAP  --   --   --   --  5  --   --   --  6  --  6   LUCIEN  --   --   --   --  7.9*  --   --   --  8.1*  --  8.0*   GLC  --   --   --   --  108*  --   --   --  119*  --  132*   ALBUMIN  --   --   --   --   --   --   --   --  2.3*  --   --    PROTTOTAL  --   --   --   --   --   --   --   --  5.8*  --   --    BILITOTAL  --   --   --   --   --   --   --   --  0.2  --   --    ALKPHOS  --   --   --   --   --   --   --   --  74  --   --    ALT  --   --   --   --   --   --   --   --  12   --   --    AST  --   --   --   --   --   --   --   --  15  --   --    TROPI  --  0.174*  --   --   --   --  0.693* 0.711* 0.551*  < > 0.017   < > = values in this interval not displayed.    Tele:  A fib, BBB, rate controlled.     Echo:  10/13/18  Interpretation Summary     Moderate to severe aortic root dilatation, 4.6 cm.  The ascending aorta is Mildly dilated.  The left ventricle is severely dilated.  The visual ejection fraction is estimated at 50-55%.  There is a catheter/pacemaker lead seen in the right ventricle.  The left atrium is severely dilated.  The right atrium is mild to moderately dilated.  There is mild (1+) mitral regurgitation.  There is moderate to severe mitral annular calcification.  Right ventricular systolic pressure is elevated, consistent with mild  pulmonary hypertension.  There is mild to moderate (1-2+) aortic regurgitation.  The rhythm was atrial fibrillation.  Mild valvular aortic stenosis.  The study was technically difficult. Contrast was used without apparent  complications. There is no comparison study available.?    ??  Karmen Rivera PA-C  [facility]  Pager [phone]

## 2018-10-15 NOTE — PLAN OF CARE
Problem: Patient Care Overview  Goal: Plan of Care/Patient Progress Review  Outcome: No Change  Pt c/o epigastric lower sternal cp especially with swallowing, but pain does continue after having swallowed a lot such as after taking a.m. Meds. Maalox helps some. Up in chair this afternoon. VSS although SBP soft in mid-low 90's for a bit this afternoon. Pt asymptomatic. Coccyx with blanchable redness. Frequently repositioned throughout the shift. Plan is for possible video swallow and/or esophogram. Palliative Care to consult tomorrow. Cardiac medical management.

## 2018-10-15 NOTE — PLAN OF CARE
Problem: Patient Care Overview  Goal: Plan of Care/Patient Progress Review  Outcome: Improving  Pt VSS on RA. Tele afib cvr. A&Ox4. Up with A of 1-2 and walker, using bathroom but usually incontinent of urine. C/o generalized pain given oxycodone prn. C/o nausea given zofran. Turned p2h. Slept. Plan to continue abxs therapy and for device interrogation tomorrow. Nurse in am to call to arrange for [facility] interrogation. Phone number [phone]. Continue to monitor.

## 2018-10-15 NOTE — PROVIDER NOTIFICATION
MD Notification    Notified Person: MD    Notified Person Name: On call-cardiologist    Notification Date/Time: 10/14/18 7:37 PM     Notification Interaction: Talked with MD    Purpose of Notification: Pt's code status still FULL code, did you want PPM interrogation? Saw in note but not ordered    Orders Received: Yes, change code status to DNR DNI and order PPM interrogation      Comments:

## 2018-10-16 NOTE — PLAN OF CARE
Problem: Patient Care Overview  Goal: Plan of Care/Patient Progress Review  OT: Attempted to see patient however getting bathed by NA prior to esophogram. NA states patient walked around the bed this AM and brushed his teeth standing at the sink.

## 2018-10-16 NOTE — PROGRESS NOTES
"   10/16/18 0900   General Information   Onset Date 10/12/18   Start of Care Date 10/14/18   Referring Physician Gerald Robbins MD; Narendra Polanco   Patient Profile Review/OT: Additional Occupational Profile Info See Profile for full history and prior level of function   Patient/Family Goals Statement To swallow without pain   Swallowing Evaluation Videofluoroscopic evaluation   Behaviorial Observations Alert   Mode of current nutrition Oral diet   Type of oral diet Dysphagia diet level 2;Nectar - thick liquid   Respiratory Status Room air   Comments Pt admitted to [facility] s/p fall at assisted living facility. PMHx includes GERD, CHF, CAD, a-fib and mild dementia. Chest x-ray revealed the lungs were clear. Speech therapy orders recieved per MD as pt with \"difficulty swallowing.\" Granddaughter reports \"gurgly\" sound with swallowing, pt reports \"stabbing\" pain and globus sensation. No Prior speech or GI hx noted.    VFSS Eval: Radiology   Radiologist Dr. Hardy   Views Taken left lateral   Physical Location of Procedure [facility]   VFSS Eval: Thin Liquid Texture Trial   Mode of Presentation, Thin Liquid cup;spoon;straw   Order of Presentation 3, 4, 5   Preparatory Phase Poor bolus control   Oral Phase, Thin Liquid Premature pharyngeal entry   Pharyngeal Phase, Thin Liquid Delayed swallow reflex   Rosenbek's Penetration Aspiration Scale: Thin Liquid Trial Results 3 - contrast remains above the vocal cords, visible residue remains (penetration)   Diagnostic Statement Delayed swallow to pyriform sinuses, moderate decreased epiglottic inversion, mild-min pharyngeal residue, penetration with residual trial 5 by straw   VFSS Eval: Nectar Thick Liquid Texture Trial   Mode of Presentation, Nectar spoon;cup   Order of Presentation 1, 2   Preparatory Phase Poor bolus control   Oral Phase, Nectar Premature pharyngeal entry   Pharyngeal Phase, Nectar Delayed swallow reflex   Rosenbek's Penetration Aspiration Scale: " Nectar-Thick Liquid Trial Results 1 - no aspiration, contrast does not enter airway   Diagnostic Statement Delayed swallow to pyriform sinuses, moderate decreased epiglottic inversion, mild to mild-min pharyngeal residue, bolus under the epiglottis    VFSS Eval: Puree Solid Texture Trial   Mode of Presentation, Puree spoon   Order of Presentation 6   Preparatory Phase Poor bolus control   Oral Phase, Puree Residue in oral cavity;Premature pharyngeal entry;Poor AP movement   Pharyngeal Phase, Puree Delayed swallow reflex   Rosenbek's Penetration Aspiration Scale: Puree Food Trial Results 1 - no aspiration, contrast does not enter airway   Diagnostic Statement Delayed swallow, moderate decreased epiglottic inversion, mild pharyngeal residue   VFSS Eval: Semisolid Texture Trial   Mode of Presentation, Semisolid spoon   Order of Presentation 7   Preparatory Phase Poor bolus control   Oral Phase, Semisolid Premature pharyngeal entry;Residue in oral cavity;Poor AP movement   Pharyngeal Phase, Semisolid Delayed swallow reflex   Rosenbek's Penetration Aspiration Scale: Semisolid Food Trial Results 1 - no aspiration, contrast does not enter airway   Diagnostic Statement Delayed swallow, moderate decreased epiglottic inversion, mild-mod pharyngeal residue   VFSS Eval: Solid Food Texture Trial   Order of Presentation Did not test due to length of oral phase with semi-solids and need to complete esophagram   Swallow Compensations   Swallow Compensations Reduce amounts   Esophageal Phase of Swallow   Patient reports or presents with symptoms of esophageal dysphagia Yes   Esophageal comments Esophagram completed   Swallow Eval: Clinical Impressions   Skilled Criteria for Therapy Intervention Skilled criteria met.  Treatment indicated.   Functional Assessment Scale (FAS) 4   Treatment Diagnosis mild-moderate oral-pharyngeal dysphagia, esophageal dysphagia   Diet texture recommendations Dysphagia diet level 2;Thin liquids    Recommended Feeding/Eating Techniques (see below)   Therapy Frequency 5 times/wk   Predicted Duration of Therapy Intervention (days/wks) 1 week   Anticipated Discharge Disposition inpatient rehabilitation facility   Risks and Benefits of Treatment have been explained. Yes   Patient, family and/or staff in agreement with Plan of Care Yes   Clinical Impression Comments Please see radiologist's report for detailed esophageal findings.  Mild-moderate oral-pharyngeal deficits were found during the video swallow study.  Deficits include decreased oral phase control, delayed swallows, and decreased epiglottic inversion.  Deficits resulted in mild to mild-moderate residue and penetration with residual during thin liquid trial by straw.  No penetration/aspiration was observed with thin liquids by cup.  Recommend use of reflux precautions and safe swallow strategies with a dysphagia diet level 2 and thin liquids.  Precautions/strategies include: sit at 90 degrees, remain upright for 60 minutes after eating, small bites/sips, alternate textures, multiple swallows, small meals at a sitting.  Hold po if increased aspiration signs are noted.  Plan to provide swallow Tx to train use of strategies/precautions with po intake and complete exercises as able.   Total Evaluation Time   Total Evaluation Time (Minutes) 15

## 2018-10-16 NOTE — CONSULTS
"BRIEF NUTRITION ASSESSMENT      REASON FOR ASSESSMENT:  RN consult - \"Requesting Boost/Ensure shakes to supplement nutrition\"    NUTRITION HISTORY:  Chart reviewed  Note pt lives in an [facility] - memory care  Meal in the dining room    Unable to obtain diet history (pt with dementia)  He tells me that he isn't a big eater - \"never want to be chubby\"      CURRENT DIET AND INTAKE:  Diet:  DD2, thin liquids, 2400 mg Na, LSF, no caffeine            Room Service with Assist              Visited with pt this afternoon  Lunch tray sitting in front of pt (only ate bites at most) - \"I am done\"  When I asked if pt liked some of the items on his tray (nutrition supplements) he responded, \"they are different all over the world\"  I had pt try the Gelatein PLUS jello and he ate ~1/3  Also had him try the PLUS2 milkshake - \"that is pretty good\"  He notes that too much food on his tray is overwhelming    Pt is being seen daily for meal ordering     10/16: VSS/esophagram ---> DD2, thin liquids, GERD precautions    Family has expressed that they will not pursue a feeding tube      ANTHROPOMETRICS:  Height: 5'8\"  Weight: (10/16) 63.4 kg  BMI: 21.2 kg/m2  IBW:  70 kg  Weight Status: Normal BMI  %IBW: 90%  Weight History: Per chart review, in 8/2018 pt had stopped his lasix and had increased leg swelling/volume overload.  Lost 4# with diuretics on his f/u 8/24.  Records would reflect that pt has had ~10-15# wt loss past 1.5 months  Wt Readings from Last 10 Encounters:   10/16/18 63.4 kg (139 lb 12.8 oz)   08/24/18 69.5 kg (153 lb 3.2 oz)   08/10/18 71.5 kg (157 lb 11.2 oz)     7/21/18 152 lbs     LABS:  Labs noted    MALNUTRITION:  % Weight Loss:  > 5% in 1 month (severe malnutrition) - ~9% past 1.5 months  % Intake:  </= 50% for >/= 5 days (severe malnutrition) - decrease po intake since admit (5 days)  Subcutaneous Fat Loss:  None observed  Muscle Loss:  Temporal region  - mild depletion  Fluid Retention:  None noted    Malnutrition " Diagnosis: Severe malnutrition  In Context of:  Chronic illness or disease (dementia, dysphagia)      NUTRITION INTERVENTION:  Nutrition Diagnosis:  Inadequate oral intake R/t decreased appetite, dysphagia, dementia AEB pt eating <50% meals    Implementation:  Nutrition Education:  not appropriate at this time due to patient condition  Pt to be seen daily for meal ordering assistance (will send only a few items per meal, as to not overwhelm pt)  Will send a vanilla PLUS2 milkshake with meals (each provides 525 madina and 19 gm pro)    FOLLOW UP/MONITORING:   Will re-evaluate in 7 - 10 days, or sooner, if re-consulted.

## 2018-10-16 NOTE — PROGRESS NOTES
[facility]  Cardiology Progress Note    Date of Service: 10/16/2018       Patient being seen by cardiology in follow up of NSTEMI.       Interval history:  GI workup underway with swallow study yesterday.   No acute cardiac issues overnight, no reports of chest pain.   BP remains under good control.     Discussed with available staff at bedside, chart reviewed, patient seen and examined.      ASSESSMENT/PLAN:    1. NSTEMI.   --Hx CAD s/p CABG with LIMA to the LAD, SVG to the OM, SVG to the distal RCA, and SVG to the diagonal (known to be occluded from coronary angiogram in 2014) and hx mild ischemic cardiomyopathy. EF 50-55% with significantly dilated LV on echo this admit.    --Suspected demand ischemia, mild trop elevation peak 0.711, trended down. Family has voiced wishes to avoid invasive procedures which is reasonable. Continue conservative management. He remains free of chest pain.    --Continue medical mgmt; ASA 81mg daily, atorvastatin 10mg daily, carvedilol 3.125mg BID, lisinopril 2.5mg daily. BP under good control currently.      2. Afib.   --Rate controlled afib with bundle branch block, on carvedilol.    --No plans for AC due to recent falls and will avoid heparin and plavix as well given his issues with anemia. Symptomatically improved after transfusion this admit.       Cardiology will sign off, please call with further questions/concerns.       Follow up: As previously arranged on 10/23/18 in [facility] with Yanni Baker PA-C.       HPI:   Salo Willis is a 88 year old male with past medical history significant for dementia, afib and CAD admitted on 10/12/2018 with hip pain after a recent mechanical fall. Mild troponin elevation, peak at 0.711. Echo with dilated LV and EF 50-55%. Acute on chronic issues with anemia as well which is being treated. Medical management undertaken.       Patient Active Problem List   Diagnosis     S/P TURP     S/P CABG (coronary artery bypass graft)      "S/P AAA repair     Prostate cancer (H)     Inguinal hernia     Hyperlipidemia     GERD (gastroesophageal reflux disease)     CHF (congestive heart failure) (H)     Carotid artery disease (H)     Cardiomyopathy (H)     CAD (coronary artery disease)     Atrial fibrillation (H)     AICD (automatic cardioverter/defibrillator) present     Abdominal aortic aneurysm (H)     Paroxysmal VT (H)     Hematoma       Subjective:  Denies CP, palpitations. Says he did get mildly dizzy when up with PT.  Somewhat confused, talking about people giving dogs away.       Objective:  Vital signs:  /40 (BP Location: Right arm)  Pulse 85  Temp 97.6  F (36.4  C) (Oral)  Resp 16  Ht 1.727 m (5' 8\")  Wt 63.4 kg (139 lb 12.8 oz)  SpO2 94%  BMI 21.26 kg/m2    Intake/Output Summary (Last 24 hours) at 10/15/18 1324  Last data filed at 10/15/18 0635   Gross per 24 hour   Intake               60 ml   Output              100 ml   Net              -40 ml       Vitals:    10/13/18 0513 10/14/18 0500 10/16/18 0500   Weight: 51.6 kg (113 lb 12.8 oz) 51.1 kg (112 lb 11.2 oz) 63.4 kg (139 lb 12.8 oz)       PE:  Gen: In general, this is an elderly  male, somewhat pale appearing. Resting in bed,  in no acute distress.   Neck: Supple with midline trachea. No significant JVD.  CV: Irregular rhythm. No murmur or rub appreciated.   Resp:Respirations are unlabored without use of accessory muscles. Auscultation of the lungs reveals clear lung fields bilaterally without wheezes or rhonchi.   GI: Soft, nontender, nondistended. BS present.   Extrem: Extremities are warm, without cyanosis or clubbing. No lower extremity edema.   Neuro: Patient a bit sleepy but does wake to answer my questions. Somewhat confused to situation. No obvious focal abnormalities.  Skin: No visible rashes. Clean and dry.     Current Medications:?    artificial saliva  15 mL Swish & Spit 4x Daily     aspirin  81 mg Oral Daily     atorvastatin  10 mg Oral Daily     " carvedilol  3.125 mg Oral BID     docusate sodium  100 mg Oral QPM     influenza Vac Split High-Dose  0.5 mL Intramuscular Prior to discharge     lisinopril  2.5 mg Oral Daily     mirtazapine  7.5 mg Oral At Bedtime     ranitidine  150 mg Oral At Bedtime     rivastigmine  1.5 mg Oral BID w/meals     scopolamine  1 patch Transdermal Q72H    And     scopolamine   Transdermal Q8H    And     [START ON 10/17/2018] scopolamine   Transdermal Q72H     traZODone  50 mg Oral At Bedtime         Labs/Imaging/Additional testing:  The following labs and imaging were reviewed during today's visit:      Recent Labs  Lab 10/15/18  0615 10/13/18  1245 10/13/18  1033 10/13/18  0556 10/12/18  2355   TROPI 0.174* 0.693* 0.711* 0.551* 0.069*         Recent Labs  Lab 10/16/18  0551 10/16/18  0003 10/15/18  1743  10/15/18  0615  10/14/18  0600  10/13/18  1245 10/13/18  1033 10/13/18  0556  10/12/18  1656   WBC  --   --   --   --   --   --   --   --   --   --  11.7*  --  11.8*   HGB 9.3* 8.5* 9.2*  < > 8.9*  < > 8.8*  < > 7.8*  --  8.5*  < > 8.5*   MCV  --   --   --   --   --   --   --   --   --   --  97  --  97   PLT  --   --   --   --   --   --   --   --   --   --  152  --  140*   INR  --   --   --   --   --   --   --   --   --   --   --   --  1.23*   NA  --   --   --   --   --   --  141  --   --   --  145*  --  141   POTASSIUM  --   --   --   --   --   --  4.5  --   --   --  4.3  --  4.4   CHLORIDE  --   --   --   --   --   --  109  --   --   --  113*  --  109   CO2  --   --   --   --   --   --  27  --   --   --  26  --  26   BUN  --   --   --   --   --   --  41*  --   --   --  44*  --  51*   CR  --   --   --   --   --   --  0.83  --   --   --  0.85  --  1.04   GFRESTIMATED  --   --   --   --   --   --  88  --   --   --  85  --  67   GFRESTBLACK  --   --   --   --   --   --  >90  --   --   --  >90  --  82   ANIONGAP  --   --   --   --   --   --  5  --   --   --  6  --  6   LUCIEN  --   --   --   --   --   --  7.9*  --   --   --  8.1*  --   8.0*   GLC  --   --   --   --   --   --  108*  --   --   --  119*  --  132*   ALBUMIN  --   --   --   --   --   --   --   --   --   --  2.3*  --   --    PROTTOTAL  --   --   --   --   --   --   --   --   --   --  5.8*  --   --    BILITOTAL  --   --   --   --   --   --   --   --   --   --  0.2  --   --    ALKPHOS  --   --   --   --   --   --   --   --   --   --  74  --   --    ALT  --   --   --   --   --   --   --   --   --   --  12  --   --    AST  --   --   --   --   --   --   --   --   --   --  15  --   --    TROPI  --   --   --   --  0.174*  --   --   --  0.693* 0.711* 0.551*  < > 0.017   < > = values in this interval not displayed.    Tele:  A fib, BBB, rate controlled.     Echo:  10/13/18  Interpretation Summary     Moderate to severe aortic root dilatation, 4.6 cm.  The ascending aorta is Mildly dilated.  The left ventricle is severely dilated.  The visual ejection fraction is estimated at 50-55%.  There is a catheter/pacemaker lead seen in the right ventricle.  The left atrium is severely dilated.  The right atrium is mild to moderately dilated.  There is mild (1+) mitral regurgitation.  There is moderate to severe mitral annular calcification.  Right ventricular systolic pressure is elevated, consistent with mild  pulmonary hypertension.  There is mild to moderate (1-2+) aortic regurgitation.  The rhythm was atrial fibrillation.  Mild valvular aortic stenosis.  The study was technically difficult. Contrast was used without apparent  complications. There is no comparison study available.?    ??  Karmen Rivera PA-C  [facility]  Pager [phone]

## 2018-10-16 NOTE — PLAN OF CARE
Problem: Patient Care Overview  Goal: Plan of Care/Patient Progress Review  Outcome: No Change  VSS, up with 1-2, walker, SR with BBB, has been constipated, had large BM today. D2 diet with thin liquids. GI consulted, no note yet viewable. On ABX for UTI. Palliative following, cards signed off, DNR/DNI. Oriented to self. TCU at discharge.  Coccyx red but blanchable, intact skin.

## 2018-10-16 NOTE — PROGRESS NOTES
"SPIRITUAL HEALTH SERVICES Progress Note  [facility]    Visit with pt, per palliative consult.  Pt was sitting up in a chair, alone, when I arrived.    Pt engaged in some life review during our conversation, as he talked about his long marriage to his wife, Brook, who  earlier this year.  He and Brook had two sons, and she had a daughter from a previous marriage.  Pt moved here recently to be closer to his younger son, whom he called \"the dependable one.\"    Pt talked about his mixed religous background, which includes a mix of [place] Latter-day, Sikhism and Episcopalian faiths.    Conversation was a bit rambling, presumably due to pt's dementia.    Provided listening and supportive conversation.  SH team is available per additional need or request.                                                                                                                                                 Bianca White M.A.  Staff   Pager [phone]  Phone [phone]      "

## 2018-10-16 NOTE — PLAN OF CARE
Problem: Patient Care Overview  Goal: Plan of Care/Patient Progress Review  Outcome: Improving  Alert, oriented to self only, calm. VSS, O2sats 96% on RA. Tele SR c BBB. C/o right hip pain but declined intervention offers besides repositioning. Ax1-2 c walker, gait belt. Incontinent at times. Swallow study, esophogram completed. Nutrition consult requested to offer supplement shakes to pt as this seems to appeal to him more. Plan for GI consult today.

## 2018-10-16 NOTE — PROGRESS NOTES
[facility]    Hospitalist Progress Note    Assessment & Plan   Salo Willis is a 88 year old male who was admitted on 10/12/2018.     Past medical history significant for coronary artery disease, hypertension, hyperlipidemia, ischemic cardiomyopathy, chronic atrial fibrillation, PVD, dementia who was admitted to previous  the [facility] due to hematoma of the right hip following a suspected mechanical fall.    Right hip hematoma thought to be secondary to mechanical fall:  -Follow with unwitnessed.  No fractures are noted on x-rays or CT scan.  -Patient is received 1 unit of packed red blood cells.  ASSESSMENT: Stable.    PLAN:   -PT/OT.  -Conditional blood transfusion order in place if hemoglobin drops below 7.  -Continue PTA ASA.      Anemia thought to be secondary to acute blood loss:  -Patient has received 1 unit of packed red blood cells.  ASSESSMENT: Stable with hemoglobin at 8.9.    PLAN:   -Conditional blood transfusions as stated above.  -Continue aspirin therapy.    Type II NSTEMI in the setting of known coronary artery disease status post 4 vessel CABG with LIMA to the LAD, SVG to the OM, SVG to the distal RCA, SVG to the diagonal (known to be occluded from coronary angiogram performed in 2014) with associated ischemic cardiomyopathy s/p ICD placement, hypertension, hyperlipidemia:  -EKG with atrial fibrillation, right bundle branch block, Q waves noted in leads II, III and aVF.  -Troponin: 0.069-->0.551-->0.711-->0.693-->0.174.  -Echocardiogram demonstrated severely dilated left ventricle with an EF of 50-55% and global hypokinetic with abnormal septal wall motion consistent with conduction abnormalities otherwise no specific specific regional wall motion abnormalities are noted.  ASSESSMENT: Stable, patient without chest pain.    PLAN:   -Appreciate cardiology consult: Plan for interrogation of ICD.   -Plan to proceed with medical management, avoid heparin and Plavix given his  propensity for bleeding and noted anemia.    -Continue aspirin 81 mg daily, Carvedilol 3.125 mg 2 times daily, lisinopril 2.5 mg daily.  -Family has had a long discussion and would like to avoid invasive procedures and have also indicated that they will change his CODE STATUS to DNR/DNI and are also considering returning of his ICD.      Chronic atrial fibrillation:  ASSESSMENT: Stable.    PLAN:   -Continue Coreg and aspirin therapy as above.  -Monitor on telemetry.    Possible urinary tract infection:  -Urine analysis with noted large Leukocyte Estrace, small amount of blood, white blood cells of 153 and hyaline casts of 13.  ASSESSMENT: Urine culture with mixed urogenital lisa less than 10,000 colonies.  PLAN:   -Discontinue antibiotic therapy.    Dementia:  ASSESSMENT: Assume patient is at baseline; though he was unable to remain awake.    PLAN:   -Continue Exelon.  -Palliative care consult requested.  -Biotene QID.      Dysphagia/odynophagia:  Likely secondary to progression of dementia.    ASSESSMENT:    PLAN:  -Await recommendations/outcome of palliative care assessment.    -SLP: Video swallow with decreased oral phase control, delayed swallows, and decreased epiglottic inversion.  Dysphagia diet level 2 and thin liquids.  Reflux precautions.    -GI Consult requested.    Constipation:  ASSESSMENT:  Reported hard small stool per nursing.  Patient with slightly distended abdomen.    PLAN:  -Miralax daily.    -Senakot BID.      DVT Prophylaxis: Pneumatic Compression Devices  Code Status: DNR/DNI    Disposition: Expected discharge unclear, likely several days. Awaiting recommendations from GI and overall goals of care to follow.       The patient has been discussed with Dr. Escalera, who agrees with the assessment and plan at this time.  Dr. Escalera will evaluate the patient independently.      Deandre Polanco PA-C   [phone]    Interval History   Patient seated in a chair upon arrival.  He offered no complaints  directly but when prompted stated he does have some trouble and pain with swallowing but could not go into any detail or quantify how often.      He then began talking about how he use to hunt.      -Data reviewed today: I reviewed all new labs and imaging results over the last 24 hours. I personally reviewed no images or EKG's today.    Physical Exam   Temp: 97.6  F (36.4  C) Temp src: Oral BP: 107/40   Heart Rate: 67 Resp: 16 SpO2: 94 % O2 Device: None (Room air)    Vitals:    10/13/18 0513 10/14/18 0500 10/16/18 0500   Weight: 51.6 kg (113 lb 12.8 oz) 51.1 kg (112 lb 11.2 oz) 63.4 kg (139 lb 12.8 oz)     Vital Signs with Ranges  Temp:  [97.6  F (36.4  C)-98.1  F (36.7  C)] 97.6  F (36.4  C)  Heart Rate:  [66-73] 67  Resp:  [16-18] 16  BP: ()/(40-56) 107/40  SpO2:  [94 %-96 %] 94 %  I/O last 3 completed shifts:  In: -   Out: 250 [Urine:250]      Constitutional: Awake, alert and cooperative, NAD.    ENT: Normocephalic, without obvious abnormality, atraumatic, oral pharynx with moist mucus membranes, tonsils without erythema or exudates.  Neck: Supple, symmetrical, trachea midline, no adenopathy.  Pulmonary: No increased work of breathing, good air exchange, CTA bilaterally, no crackles or wheezing.  Cardiovascular: RRR, normal S1 and S2, no S3 or S4, and no murmur noted.  GI: Hypoactive bowel sounds, soft, mildly distended, non-tender.  Skin/Integumen: Clear.  Neuro: CN II-XII grossly intact.  Psych:  Normal affect.  Extremities: No lower extremity edema noted, and non-TTP bilaterally.       Medications       artificial saliva  15 mL Swish & Spit 4x Daily     aspirin  81 mg Oral Daily     atorvastatin  10 mg Oral Daily     carvedilol  3.125 mg Oral BID     cholecalciferol  2,000 Units Oral Daily     docusate sodium  100 mg Oral QPM     influenza Vac Split High-Dose  0.5 mL Intramuscular Prior to discharge     lisinopril  2.5 mg Oral Daily     mirtazapine  7.5 mg Oral At Bedtime     ranitidine  150 mg Oral At  Bedtime     rivastigmine  1.5 mg Oral BID w/meals     scopolamine  1 patch Transdermal Q72H    And     scopolamine   Transdermal Q8H    And     [START ON 10/17/2018] scopolamine   Transdermal Q72H     traZODone  50 mg Oral At Bedtime       Data     Recent Labs  Lab 10/16/18  0551 10/16/18  0003 10/15/18  1743  10/15/18  0615  10/14/18  0600  10/13/18  1245 10/13/18  1033 10/13/18  0556  10/12/18  1656   WBC  --   --   --   --   --   --   --   --   --   --  11.7*  --  11.8*   HGB 9.3* 8.5* 9.2*  < > 8.9*  < > 8.8*  < > 7.8*  --  8.5*  < > 8.5*   MCV  --   --   --   --   --   --   --   --   --   --  97  --  97   PLT  --   --   --   --   --   --   --   --   --   --  152  --  140*   INR  --   --   --   --   --   --   --   --   --   --   --   --  1.23*   NA  --   --   --   --   --   --  141  --   --   --  145*  --  141   POTASSIUM  --   --   --   --   --   --  4.5  --   --   --  4.3  --  4.4   CHLORIDE  --   --   --   --   --   --  109  --   --   --  113*  --  109   CO2  --   --   --   --   --   --  27  --   --   --  26  --  26   BUN  --   --   --   --   --   --  41*  --   --   --  44*  --  51*   CR  --   --   --   --   --   --  0.83  --   --   --  0.85  --  1.04   ANIONGAP  --   --   --   --   --   --  5  --   --   --  6  --  6   LUCIEN  --   --   --   --   --   --  7.9*  --   --   --  8.1*  --  8.0*   GLC  --   --   --   --   --   --  108*  --   --   --  119*  --  132*   ALBUMIN  --   --   --   --   --   --   --   --   --   --  2.3*  --   --    PROTTOTAL  --   --   --   --   --   --   --   --   --   --  5.8*  --   --    BILITOTAL  --   --   --   --   --   --   --   --   --   --  0.2  --   --    ALKPHOS  --   --   --   --   --   --   --   --   --   --  74  --   --    ALT  --   --   --   --   --   --   --   --   --   --  12  --   --    AST  --   --   --   --   --   --   --   --   --   --  15  --   --    TROPI  --   --   --   --  0.174*  --   --   --  0.693* 0.711* 0.551*  < > 0.017   < > = values in this interval not  displayed.    No results found for this or any previous visit (from the past 24 hour(s)).

## 2018-10-16 NOTE — CONSULTS
[facility]    Palliative Care Consultation     Salo Willis  MRN# [id number]  Date of Admission:  10/12/2018  Date of Service (when I saw the patient): 10/15/18  Reason for consult: Consulted by Dr. Martinez for Goals of care    Assessment & Plan   Salo Willis is a 88 year old male who was admitted on 10/12/2018. Past medical history significant for coronary artery disease, hypertension, hyperlipidemia, ischemic cardiomyopathy, chronic atrial fibrillation, PVD, dementia who was admitted to [facility] due to hematoma of the right hip following a suspected mechanical fall.     Symptoms/Recommendations   1. Goals of Care. Met with Mr Willis and his daughter in law Geno this afternoon. Geno tells me that they are not ready for hospice yet, but are also not interested in pursuing aggressive treatments/interventions. The goal is to get Mr Willis back to his home (memory care AL), where he can do some PT/OT and recover. She is concerned with his swallow dysfunction which is new. She feels further work up is needed to better understand the cause of this new problem because if he can't get sufficient nutrition then he will not have the energy to work with therapy and continue to recover. She tells me if the dysphagia will not get better, they will not pursue a feeding tube and we would then be shifting our goals likely to comfort. We made a plan to continue our conversation after Mr Willis has completed a video swallow study and has been seen by GI.     Support/Coping  -pt's daughter in law at bedside   -Will involve Palliative LICSW, Chikis Ellison, and/or Palliative , Magy White    Decisional Support, Goals of Care, Counseling & Coordination  Decisional Capacity Intact?  -No  Health Care Directive on File?  -No  Code Status/Resuscitation Preferences?  -DNR/DNI  Plan of Care?  -continue with current medical management    Discussion  Introduced the scope of our practice  to Mr Willis and his daughter in law Geno. Discussed our potential roles for symptom management, support/coping, and decisional support (aka goals of care).     Mr Willis was seen sitting comfortably in a chair. He was awake but did not participate much in my conversation with him and his daughter in law Geno. Geno shared with me that Mr Willis lived in his home with his wife near [facility] [place] until March/April of this past year. His wife  from Lewy body dementia at that time and he subsequently came to [place] to live with his son and daughter in law. She tells me he had 3 falls while living in [place], and then 4 since he moved to [place]. She tells me he now lives at an assisted living facility near her home in the memory care unit. She feels he is getting exceptional care there. He was able to receive PT/OT services while remaining in his apartment as opposed to going to a rehab facility. She tells me after his most recent fall he bled into his hip and that blood loss anemia is what caused his heart attack. She tells me they do not want to pursue aggressive therapies, rather they would like to keep him comfortable and get him home. She tells me they are not ready for hospice yet. I asked her what she thinks of when she thinks of hospice and she said taking care of someone who is in their final days. I shared with her that hospice is actually support/cares for someone in their final months of life which I feel he might be in. I asked if she thought that coming back to the hospital was becoming too burdensome or uncomfortable for Mr Willis. She said she didn't think they were ready to be done coming back to the hospital for acute illness, she feels the blood transfusion he received saved his life. She then began discussing his swallow dysfunction. She said he has always had GERD, though with taking Zantac and avoiding spicy/greesy foods he has done okay. She feels that more work up on his swallowing  safety is important as this could be a limiting factor to him getting better. She said they would not be interested in placing a feeding tube. We discussed the nature of progression of dementia and how it can impact a person's swallow function. We also reviewed that frequent hospital stays can cause global weakness which can affect swallowing. Mr Willis appears to be experiencing pain with swallowing, unclear what may be the source of his pain. I feel it would be reasonable to further work this up. Geno and I made a plan to continue our conversation tomorrow when the results of the swallow study are complete. She was agreeable with GI consultation to hear their opinion as well.     Thank you for involving us in the care of this patient and family. We will continue to follow. Please do not hesitate to contact me with questions or concerns or the on-call provider for our team if evening or weekend.    Allie GUERIN, [facility]  Palliative Medicine   Pager [phone]    Attestation:  Total time on the floor involved in the patient's care: 70 minutes  Total time spent in counseling/care coordination: >50%    Chief Complaint   Fall, dysphagia, goals of care    History is obtained from the patient, his daughter in law Geno, staff, and extensive chart review.     Adopted from H&P  Salo Rafael Willis is a 88 year old male who presents with right hip pain. Basically he lives at Assisted Living and his family pays for close to 24 hour care. He can walk with a walker, but does not do his grocery shopping. He falls quite frequently, but takes aspirin and plavix for his history of CAD and high risk atrial fibrillation. He fell two days ago, unwitnessed. Further history is not obtainable because of dementia.      In the ED he was found to have right gluteal hematoma on the CT CAP. He developed alia substernal burning of the chest after the procedure. But EKG did not show any ALTAGRACIA. He cannot provide more history, states his  pain has been more chronic in nature, no radiation. He cannot say if its exertional.     Past Medical History    I have reviewed this patient's medical history and updated it with pertinent information if needed.   Past Medical History:   Diagnosis Date     Abdominal aortic aneurysm (H)      AICD (automatic cardioverter/defibrillator) present      Atrial fibrillation (H)      CAD (coronary artery disease)      Cardiomyopathy (H)      Carotid artery disease (H)      CHF (congestive heart failure) (H)      GERD (gastroesophageal reflux disease)      Hyperlipidemia      Inguinal hernia      Paroxysmal VT (H)      Prostate cancer (H)      S/P AAA repair      S/P CABG (coronary artery bypass graft)      S/P TURP        Past Surgical History   I have reviewed this patient's surgical history and updated it with pertinent information if needed.  Past Surgical History:   Procedure Laterality Date     AICD, DUAL CHAMBER  2008    [facility] DR ACUÑA 2207-36. Implanted by Dr. Marks with [facility] in [place].     CAROTID ENDARTERECTOMY Right      CORONARY ARTERY BYPASS      LIMA to LAD, SVG to OM, SVG to distal RCA, and SVG to Diagonal     HEART CATH LEFT HEART CATH  10/17/2014    LM, RCA, LAD, and left CFX occlusions: patent LIMA graft to LAD, patent SVGs to OM and distal RCA. Vein graft to diagonal previously found to be occluded. Mild aortic stenosis with 20 mmHg gradient across the aortic valve. Continue medical therapy.        Social History   Living situation: lives in assisted living memory care unit     Family system:  (wife  from lewy body dementia in 2018), son and daughter in law, further family system not identified     Self-identified support system: unable to assess    Employment/education: did not assess    Activities/interests: did not assess    Use of community resources: has worked with  home health services in the past    [employer] affiliation: did not assess  "    Involvement in [facility] community: not assessed     Impact of illness on patient: \"falls have been hard\"    Family History   I have reviewed this patient's family history and updated it with pertinent information if needed.   Family History   Problem Relation Age of Onset     Unknown/Adopted Mother      Unknown/Adopted Father        Allergies   Allergies   Allergen Reactions     Niacin        Medications   Current Facility-Administered Medications Ordered in Epic   Medication Dose Route Frequency Last Rate Last Dose     acetaminophen (TYLENOL) tablet 1,000 mg  1,000 mg Oral TID PRN   1,000 mg at 10/15/18 1830     alum & mag hydroxide-simethicone (MYLANTA ES/MAALOX  ES) suspension 30 mL  30 mL Oral Q4H PRN   30 mL at 10/15/18 1613     aspirin EC tablet 81 mg  81 mg Oral Daily   81 mg at 10/15/18 0843     atorvastatin (LIPITOR) tablet 10 mg  10 mg Oral Daily   10 mg at 10/15/18 0843     carvedilol (COREG) tablet 3.125 mg  3.125 mg Oral BID   3.125 mg at 10/15/18 0843     cholecalciferol (vitamin D3) tablet 2,000 Units  2,000 Units Oral Daily   2,000 Units at 10/15/18 0843     docusate sodium (COLACE) capsule 100 mg  100 mg Oral QPM   100 mg at 10/14/18 2018     influenza Vac Split High-Dose (FLUZONE) injection 0.5 mL  0.5 mL Intramuscular Prior to discharge         lisinopril (PRINIVIL/Zestril) tablet 2.5 mg  2.5 mg Oral Daily   2.5 mg at 10/15/18 0843     melatonin tablet 1 mg  1 mg Oral At Bedtime PRN         mirtazapine (REMERON) tablet TABS 7.5 mg  7.5 mg Oral At Bedtime   7.5 mg at 10/14/18 2018     naloxone (NARCAN) injection 0.1-0.4 mg  0.1-0.4 mg Intravenous Q2 Min PRN         ondansetron (ZOFRAN-ODT) ODT tab 4 mg  4 mg Oral Q6H PRN   4 mg at 10/14/18 2025    Or     ondansetron (ZOFRAN) injection 4 mg  4 mg Intravenous Q6H PRN   4 mg at 10/13/18 1426     oxyCODONE IR (ROXICODONE) half-tab 2.5 mg  2.5 mg Oral Q4H PRN   2.5 mg at 10/14/18 2019     prochlorperazine (COMPAZINE) injection 5-10 mg  5-10 mg " Intravenous Q6H PRN   5 mg at 10/13/18 2228    Or     prochlorperazine (COMPAZINE) tablet 5-10 mg  5-10 mg Oral Q6H PRN        Or     prochlorperazine (COMPAZINE) Suppository 12.5 mg  12.5 mg Rectal Q12H PRN         ranitidine (ZANTAC) tablet 150 mg  150 mg Oral At Bedtime   150 mg at 10/14/18 2020     rivastigmine (EXELON) capsule 1.5 mg  1.5 mg Oral BID w/meals   1.5 mg at 10/15/18 1820     scopolamine (TRANSDERM) 72 hr patch 1 patch  1 patch Transdermal Q72H   1 patch at 10/14/18 0245    And     scopolamine (TRANSDERM-SCOP) Patch in Place   Transdermal Q8H        And     [START ON 10/17/2018] scopolamine (TRANSDERM-SCOP) patch REMOVAL   Transdermal Q72H         traZODone (DESYREL) tablet 50 mg  50 mg Oral At Bedtime   50 mg at 10/14/18 2019     No current Epic-ordered outpatient prescriptions on file.       Review of Systems   The comprehensive review of systems is negative other than noted in the assessment/plan.    Physical Exam   Temp: 97.8  F (36.6  C) Temp src: Oral BP: 92/48   Heart Rate: 72 Resp: 18 SpO2: 95 % O2 Device: None (Room air)    Vitals:    10/12/18 1642 10/13/18 0513 10/14/18 0500   Weight: 63.5 kg (140 lb) 51.6 kg (113 lb 12.8 oz) 51.1 kg (112 lb 11.2 oz)     CONSTITUTIONAL: NAD, A&O to self, calm cooperative   HEENT: NCAT, MMM  NECK: Supple  CARDIOVASCULAR: exam deferred  RESPIRATORY: NL respiratory effort on RA  GASTROINTESTINAL: exam deferred   MUSCULOSKELETAL:  Moving freely in chair  SKIN: Warm and intact. No concerning lesions or rashes on exposed skin surfaces   NEUROLOGIC: Appropriately responsive during interview  PSYCH: Affect flat    Data   Results for orders placed or performed during the hospital encounter of 10/12/18 (from the past 24 hour(s))   Hemoglobin   Result Value Ref Range    Hemoglobin 8.9 (L) 13.3 - 17.7 g/dL   Hemoglobin   Result Value Ref Range    Hemoglobin 8.9 (L) 13.3 - 17.7 g/dL   Troponin I   Result Value Ref Range    Troponin I ES 0.174 () 0.000 - 0.045 ug/L    Hemoglobin   Result Value Ref Range    Hemoglobin 8.4 (L) 13.3 - 17.7 g/dL   Hemoglobin   Result Value Ref Range    Hemoglobin 9.2 (L) 13.3 - 17.7 g/dL

## 2018-10-16 NOTE — PLAN OF CARE
Problem: Patient Care Overview  Goal: Plan of Care/Patient Progress Review    Discharge Planner SLP   Patient plan for discharge: Did not state  Current status: A video swallow study and esophagram were completed this am.  Please see radiologist's report for detailed esophageal findings.  Mild-moderate oral-pharyngeal deficits were found during the video swallow study.  Deficits include decreased oral phase control, delayed swallows, and decreased epiglottic inversion.  Deficits resulted in mild to mild-moderate residue and penetration with residual during thin liquid trial by straw.  No penetration/aspiration was observed with thin liquids by cup.  Recommend use of reflux precautions and safe swallow strategies with a dysphagia diet level 2 and thin liquids.  Precautions/strategies include: sit at 90 degrees, remain upright for 60 minutes after eating, small bites/sips, alternate textures, multiple swallows, small meals at a sitting.  Hold po if increased aspiration signs are noted.  Plan to provide swallow Tx to train use of strategies/precautions with po intake and complete exercises as able.  Barriers to return to prior living situation: Level of assist  Recommendations for discharge: TCU with short term SLP Tx  Rationale for recommendations: SLP swallow Tx to maximize swallow function for a least restrictive diet       Entered by: Michell Landin 10/16/2018 9:38 AM

## 2018-10-16 NOTE — CONSULTS
Consult received.  Will see patient today.    [facility]  [facility]  Gastroenterology Consultation         Salo Willis  [address] [address]  88 year old male    Admission Date/Time: 10/12/2018  Primary Care Provider: Danica Lomax Physician  Referring / Attending Physician:  Narendra    We were asked to see the patient in consultation by Dr. Polanco for evaluation of Dysphagia.      CC: Dysphagia, odynophagia    HPI:  Salo Willis is a 88 year old male with extensive past medical history significant for coronary artery disease ischemic cardiomyopathy chronic atrial fibrillation peripheral vascular disease hypertension hyperlipidemia dementia with history of fall hematoma drop in his hemoglobin with possible acute coronary syndrome severe LV and left atrial enlargement with global ejection fraction decreased with significant dementia swallowing problem, with video swallow evaluation showed delayed swallows decreased oral phase control.  Patient is sitting up in the chair fairly comfortably patient is not complaining of any throat discomfort or pain.  Patient denied any history of vomiting.  However it is difficult to assess due to his significant dementia.  Finding discussed with the hospitalist team.    ROS: A comprehensive ten point review of systems was negative aside from those in mentioned in the HPI.      PAST MED HX:  I have reviewed this patient's medical history and updated it with pertinent information if needed.   Past Medical History:   Diagnosis Date     Abdominal aortic aneurysm (H)      AICD (automatic cardioverter/defibrillator) present      Atrial fibrillation (H)      CAD (coronary artery disease)      Cardiomyopathy (H)      Carotid artery disease (H)      CHF (congestive heart failure) (H)      GERD (gastroesophageal reflux disease)      Hyperlipidemia      Inguinal hernia      Paroxysmal VT (H)       Prostate cancer (H)      S/P AAA repair      S/P CABG (coronary artery bypass graft)      S/P TURP        MEDICATIONS:   Prior to Admission Medications   Prescriptions Last Dose Informant Patient Reported? Taking?   acetaminophen (TYLENOL) 500 MG tablet 10/11/2018 at 0220  Yes Yes   Sig: Take 1,000 mg by mouth 3 times daily as needed for pain   aspirin 81 MG EC tablet 10/12/2018 at am  No Yes   Sig: Take 1 tablet (81 mg) by mouth daily   atorvastatin (LIPITOR) 10 MG tablet 10/12/2018 at am  Yes Yes   Sig: Take 10 mg by mouth daily   carvedilol (COREG) 6.25 MG tablet 10/12/2018 at am  Yes Yes   Sig: Take 3.125 mg by mouth 2 times daily 6.25 mg x 0.5 tabs= 3.125mg   cholecalciferol (VITAMIN  -D) 1000 units capsule 10/12/2018 at am  Yes Yes   Sig: Take 2 capsules by mouth daily   clopidogrel (PLAVIX) 75 MG tablet 10/12/2018 at am  No Yes   Sig: Take 1 tablet (75 mg) by mouth daily   docusate sodium (STOOL SOFTENER) 100 MG tablet 10/11/2018 at 1700  Yes Yes   Sig: Take 100 mg by mouth every evening    furosemide (LASIX) 20 MG tablet 10/12/2018 at am  No Yes   Sig: Take 1 tablet (20 mg) by mouth daily   lisinopril (PRINIVIL/ZESTRIL) 2.5 MG tablet 10/11/2018 at 1700  No Yes   Sig: Take 1 tablet (2.5 mg) by mouth daily   mirtazapine (REMERON) 7.5 mg TABS 10/11/2018 at hs  Yes Yes   Sig: Take 7.5 mg by mouth At Bedtime    oxyCODONE HCl (OXAYDO) 5 MG TABA 10/12/2018 at 0713  Yes Yes   Sig: Take 2.5 mg by mouth every 4 hours as needed (pain) 5mg tab x 0.5 tab = 2.5mg   potassium chloride SA (K-DUR/KLOR-CON M) 10 MEQ CR tablet 10/11/2018 at 1700  Yes Yes   Sig: Take 10 mEq by mouth every evening    ranitidine (ZANTAC) 150 MG tablet 10/11/2018 at hs  Yes Yes   Sig: Take 150 mg by mouth At Bedtime    rivastigmine (EXELON) 1.5 MG capsule 10/12/2018 at am  Yes Yes   Sig: Take 1.5 mg by mouth 2 times daily (with meals)    traZODone (DESYREL) 50 MG tablet 10/11/2018 at 2000  Yes Yes   Sig: Take 50 mg by mouth At Bedtime     "  Facility-Administered Medications: None       ALLERGIES:   Allergies   Allergen Reactions     Niacin        SOCIAL HISTORY:  Social History   Substance Use Topics     Smoking status: Former Smoker     Types: Cigarettes     Smokeless tobacco: Former User      Comment: smoked as a \"young man\"      Alcohol use Not on file       FAMILY HISTORY:  Family History   Problem Relation Age of Onset     Unknown/Adopted Mother      Unknown/Adopted Father        PHYSICAL EXAM:   General awake and alert sitting up comfortably in the chair  Vital Signs with Ranges  Temp: 98.1  F (36.7  C) Temp src: Oral BP: 118/55   Heart Rate: 73 Resp: 16 SpO2: 94 % O2 Device: None (Room air)    I/O last 3 completed shifts:  In: 220 [P.O.:220]  Out: 350 [Urine:350]    Constitutional: healthy, alert and no distress   Cardiovascular: negative, PMI normal. No lifts, heaves, or thrills. RRR. No murmurs, clicks gallops or rub  Respiratory: negative, Percussion normal. Good diaphragmatic excursion. Lungs clear  Head: Normocephalic. No masses, lesions, tenderness or abnormalities  Neck: Neck supple. No adenopathy. Thyroid symmetric, normal size,, Carotids without bruits.  Abdomen: Abdomen soft, non-tender. BS normal. No masses, organomegaly          ADDITIONAL COMMENTS:   I reviewed the patient's new clinical lab test results.   Recent Labs   Lab Test  10/16/18   0551  10/16/18   0003  10/15/18   1743   10/13/18   0556   10/12/18   1656   WBC   --    --    --    --   11.7*   --   11.8*   HGB  9.3*  8.5*  9.2*   < >  8.5*   < >  8.5*   MCV   --    --    --    --   97   --   97   PLT   --    --    --    --   152   --   140*   INR   --    --    --    --    --    --   1.23*    < > = values in this interval not displayed.     Recent Labs   Lab Test  10/14/18   0600  10/13/18   0556  10/12/18   1656   POTASSIUM  4.5  4.3  4.4   CHLORIDE  109  113*  109   CO2  27  26  26   BUN  41*  44*  51*   ANIONGAP  5  6  6     Recent Labs   Lab Test  10/13/18   0556  " 10/12/18   2006   ALBUMIN  2.3*   --    BILITOTAL  0.2   --    ALT  12   --    AST  15   --    PROTEIN   --   10*       I reviewed the patient's new imaging results.        CONSULTATION ASSESSMENT AND PLAN:    Active Problems:    Hematoma    Assessment: Very pleasant 88-year-old gentleman with complicated past history with extensive cardiac problems dementia with some swallowing problems and aspiration episodes patient findings are quite suggestive of possible neuromuscular problem and swallowing problem due to oral mechanism there is no indications of mechanical obstruction.  Less likely findings very well can include gastroesophageal reflux disease and esophageal stricture.  Patient had a recent history of fall and hematoma.  Patient has significant high risk due to advanced multiple systemic complaints and cardiac problems for any invasive procedure.  I will recommend the patient to be treated medically and monitored closely follow speech therapy recommendation.  If patient's symptoms become worse or there is a change in status patient can be evaluated for upper GI endoscopy to evaluate further upper GI tract.  Thank you very much for letting me participate in his care I will continue to follow him along with you.            Marcello Reyes MD, FACP  [facility].  Office: [phone]  Cell : [phone]

## 2018-10-16 NOTE — PLAN OF CARE
Problem: Patient Care Overview  Goal: Plan of Care/Patient Progress Review  Outcome: No Change  Pt is A/O with intermittent confusion and agitation. VSS. On room air. Pt is incontinent of bladder. Periods of impulsiveness overnight. Modified diet with thicken liquids. Pt takes meds whole. Tele SR with BBB. Continuing to trend hgb Q6. Pt has ICD implant. Palliative consult yesterday. Plan for GI work up today. NPO since 5am. Pt very unstable on feet. A-2 with gb and walker.

## 2018-10-17 NOTE — PLAN OF CARE
Problem: Patient Care Overview  Goal: Plan of Care/Patient Progress Review  OT: Attempted to see patient however he declines. Returned to sleep quickly.

## 2018-10-17 NOTE — PROGRESS NOTES
Serge score = 14. Red blanchable area note to coccyx and right proximal heel. Patient repositioned onto left side with pillows, heels suspended. Verbally informed assigned RN TEJ Will ask for Meplix to be placed to coccyx and heel with next reposition d/t increased discomfort with movement. Education to sitter NA. Will continue to reposition every 2 hours and PRN. Will ask for WOCN consult tomorrow if needed.  Rachelle Troncoso RN

## 2018-10-17 NOTE — PROGRESS NOTES
[facility]    Hospitalist Progress Note    Assessment & Plan   Salo Willis is a 88 year old male who was admitted on 10/12/2018.     Past medical history significant for coronary artery disease, hypertension, hyperlipidemia, ischemic cardiomyopathy, chronic atrial fibrillation, PVD, dementia who was admitted to previous [facility] due to hematoma of the right hip following a suspected mechanical fall.    Right hip hematoma thought to be secondary to mechanical fall:  -Follow with unwitnessed.  No fractures are noted on x-rays or CT scan.  -Patient received 1 unit of packed red blood cells.  ASSESSMENT: Stable.    PLAN:   -PT/OT.  -Conditional blood transfusion order in place if hemoglobin drops below 7.  -Continue PTA ASA.      Anemia thought to be secondary to acute blood loss:  -Patient has received 1 unit of packed red blood cells.  ASSESSMENT: Stable.    Recent Labs  Lab 10/16/18  0551 10/16/18  0003 10/15/18  1743 10/15/18  1233 10/15/18  0615 10/15/18  0005   HGB 9.3* 8.5* 9.2* 8.4* 8.9* 8.9*   PLAN:   -Conditional blood transfusions as stated above.  -Continue aspirin therapy.    Type II NSTEMI in the setting of known coronary artery disease status post 4 vessel CABG with LIMA to the LAD, SVG to the OM, SVG to the distal RCA, SVG to the diagonal (known to be occluded from coronary angiogram performed in 2014) with associated ischemic cardiomyopathy s/p ICD placement, hypertension, hyperlipidemia:  -EKG with atrial fibrillation, right bundle branch block, Q waves noted in leads II, III and aVF.  -Troponin: 0.069-->0.551-->0.711-->0.693-->0.174.  -Echocardiogram demonstrated severely dilated left ventricle with an EF of 50-55% and global hypokinetic with abnormal septal wall motion consistent with conduction abnormalities otherwise no specific specific regional wall motion abnormalities are noted.  ASSESSMENT: Stable, patient without chest pain.    PLAN:   -Appreciate cardiology  consult:    -Proceed with medical management, avoid heparin and Plavix given his propensity for bleeding and noted anemia.    -Continue aspirin 81 mg daily, Carvedilol 3.125 mg 2 times daily, lisinopril 2.5 mg daily.  -Family has had a long discussion and would like to avoid invasive procedures and have also indicated that they will change his CODE STATUS to DNR/DNI and are also considering turning off his ICD.      Chronic atrial fibrillation:  ASSESSMENT: Stable.    PLAN:   -Continue Coreg and aspirin therapy as above.  -Monitor on telemetry.    Dementia:  ASSESSMENT: Assume patient is at baseline; though he was unable to remain awake.    PLAN:   -Continue Exelon.  -Palliative care consult requested.  -Biotene QID.      Dysphagia/odynophagia:  Likely secondary to progression of dementia.    ASSESSMENT:  Dysphagia diet level 2 with thin liquids.    PLAN:  -Await recommendations/outcome of palliative care assessment.    -SLP: Video swallow with decreased oral phase control, delayed swallows, and decreased epiglottic inversion.  Dysphagia diet level 2 and thin liquids.  Reflux precautions.    -GI Consult appreciated:  Finding on video swallow study and SLP evaluation suggestive of possible neuromuscular issue and swallowing problem due to oral mechanism without indication of mechanical obstruction.  Recommend medical management and monitor closely with continuation with SLP therapy.    -Will discharge with increased Zantac 150 mg BID.      Constipation:  ASSESSMENT:  Slight improvement.    PLAN:  -Miralax daily.    -Senakot BID.      Possible urinary tract infection:  -Urine analysis with noted large Leukocyte Estrace, small amount of blood, white blood cells of 153 and hyaline casts of 13.  ASSESSMENT: Urine culture with mixed urogenital lisa less than 10,000 colonies.  PLAN:   -Discontinue antibiotic therapy (received 2 days of ceftriaxone).    DVT Prophylaxis: Pneumatic Compression Devices  Code Status:  "DNR/DNI    Disposition: Expected discharge tomorrow back to memory care [facility] with increased therapies.      The patient has been discussed with Dr. Falcon, who agrees with the assessment and plan at this time.  Dr. Falcon will evaluate the patient independently.      Deandre Polanco PA-C   [phone]    Interval History   Patient sleeping in bed upon arrival.  He awoke and stated he was \"blah\".  He said he didn't feel well but couldn't specify anything.  He indicated he would prefer to be left alone.      I spoke with patient's son earlier in the day and reviewed swallow study findings and GI recommendations.,      Palliative care team talked with son and daughter-in-law and plan to discharge back to home with increased cares/therapies.      ADDENDUM:  Daughter-in-law ans son came to visit this afternoon and were concerned about how the patient appeared.  Met with both of them with [facility] and discussed what has been observed since hospitalization.  Discussed plans for discharge and reviewed roles for hospice and therapies.    Ideally they want to try and get the patient back to his facility and will work on getting him back into memory care.  They plan to discuss hospice further and let us know what they decided tomorrow.      -Data reviewed today: I reviewed all new labs and imaging results over the last 24 hours. I personally reviewed no images or EKG's today.    Physical Exam   Temp: 98.1  F (36.7  C) Temp src: Oral BP: 108/62   Heart Rate: 90 Resp: 16 SpO2: 98 % O2 Device: None (Room air)    Vitals:    10/13/18 0513 10/14/18 0500 10/16/18 0500   Weight: 51.6 kg (113 lb 12.8 oz) 51.1 kg (112 lb 11.2 oz) 63.4 kg (139 lb 12.8 oz)     Vital Signs with Ranges  Temp:  [97.5  F (36.4  C)-98.1  F (36.7  C)] 98.1  F (36.7  C)  Heart Rate:  [67-90] 90  Resp:  [16] 16  BP: (104-118)/(40-72) 108/62  SpO2:  [93 %-98 %] 98 %  I/O last 3 completed shifts:  In: 320 [P.O.:320]  Out: 400 [Urine:400]      Constitutional: " Asleep but awakened easily, cooperative and NAD.    ENT: NCAT, oral pharynx with moist mucus membranes, tonsils without erythema or exudates.  Neck: Supple, symmetrical, trachea midline, no adenopathy.  Pulmonary: No increased work of breathing, good air exchange, CTA bilaterally, no crackles or wheezing.  Cardiovascular: Irreg Irreg, normal S1 and S2, no S3 or S4, and no murmur noted.  GI: Bowel sounds present, soft, mildly distended, non-tender.  Skin/Integumen: Clear.  Neuro: CN II-XII grossly intact.  Psych:  Normal affect.  Extremities: No lower extremity edema noted, and non-TTP bilaterally.       Medications       artificial saliva  15 mL Swish & Spit 4x Daily     aspirin  81 mg Oral Daily     atorvastatin  10 mg Oral Daily     carvedilol  3.125 mg Oral BID     docusate sodium  100 mg Oral QPM     influenza Vac Split High-Dose  0.5 mL Intramuscular Prior to discharge     lisinopril  2.5 mg Oral Daily     mirtazapine  7.5 mg Oral At Bedtime     polyethylene glycol  17 g Oral Daily     ranitidine  150 mg Oral At Bedtime     rivastigmine  1.5 mg Oral BID w/meals     scopolamine  1 patch Transdermal Q72H    And     scopolamine   Transdermal Q8H    And     scopolamine   Transdermal Q72H     sennosides  1-2 tablet Oral BID     traZODone  50 mg Oral At Bedtime       Data     Recent Labs  Lab 10/16/18  0551 10/16/18  0003 10/15/18  1743  10/15/18  0615  10/14/18  0600  10/13/18  1245 10/13/18  1033 10/13/18  0556  10/12/18  1656   WBC  --   --   --   --   --   --   --   --   --   --  11.7*  --  11.8*   HGB 9.3* 8.5* 9.2*  < > 8.9*  < > 8.8*  < > 7.8*  --  8.5*  < > 8.5*   MCV  --   --   --   --   --   --   --   --   --   --  97  --  97   PLT  --   --   --   --   --   --   --   --   --   --  152  --  140*   INR  --   --   --   --   --   --   --   --   --   --   --   --  1.23*   NA  --   --   --   --   --   --  141  --   --   --  145*  --  141   POTASSIUM  --   --   --   --   --   --  4.5  --   --   --  4.3  --  4.4    CHLORIDE  --   --   --   --   --   --  109  --   --   --  113*  --  109   CO2  --   --   --   --   --   --  27  --   --   --  26  --  26   BUN  --   --   --   --   --   --  41*  --   --   --  44*  --  51*   CR  --   --   --   --   --   --  0.83  --   --   --  0.85  --  1.04   ANIONGAP  --   --   --   --   --   --  5  --   --   --  6  --  6   LUCIEN  --   --   --   --   --   --  7.9*  --   --   --  8.1*  --  8.0*   GLC  --   --   --   --   --   --  108*  --   --   --  119*  --  132*   ALBUMIN  --   --   --   --   --   --   --   --   --   --  2.3*  --   --    PROTTOTAL  --   --   --   --   --   --   --   --   --   --  5.8*  --   --    BILITOTAL  --   --   --   --   --   --   --   --   --   --  0.2  --   --    ALKPHOS  --   --   --   --   --   --   --   --   --   --  74  --   --    ALT  --   --   --   --   --   --   --   --   --   --  12  --   --    AST  --   --   --   --   --   --   --   --   --   --  15  --   --    TROPI  --   --   --   --  0.174*  --   --   --  0.693* 0.711* 0.551*  < > 0.017   < > = values in this interval not displayed.    Recent Results (from the past 24 hour(s))   XR Video Swallow w Esophagram    Narrative    VIDEO SWALLOWING EVALUATION October 16, 2018 9:45 AM     HISTORY: Dysphagia. Cough with liquid consistencies. Pain in the lower  chest after swallowing.    COMPARISON: None.    FLUOROSCOPY TIME: 2.5 minutes.  SPOT IMAGES OR CINE RUNS: Eleven.    FINDINGS:    Thin: Delay, otherwise normal by teaspoon and cup. By straw,  penetration of the vestibule.    Nectar: Normal.    Honey: Not administered.    Pudding: Normal.    Semisolid: Mild residue, otherwise normal.    Solid: Not administered.    Esophagram in the lateral projection shows decreased contractility in  the distal esophagus consistent with dysmotility, with residual food  and barium in the lower esophagus. This clears slowly with additional  barium and swallowing. No mass is seen. Left atrium of the heart is  enlarged and causes smooth  extrinsic mass effect on the distal  esophagus.    ACE GARCIA MD

## 2018-10-17 NOTE — PLAN OF CARE
Problem: Patient Care Overview  Goal: Plan of Care/Patient Progress Review  Outcome: No Change  Pt is confused with the exception to self. Sitter in room. Attempted long arm sit, unsuccessful, pt very impulsive. VSS. On room air.Tele is Afib. Pt denies chest pain/SOB. A-2 with GB and walker. Pt is very unsteady on feet and impulsive. Turn and repo Q2H. Incontinent of bladder. Becomes agitated when re positing. Bottom is light red but blanchable. DD@ with thin liquids, must remain upright x1 hours after any food or liquids. Pt had paliative consult a few days ago. Plan to continue with medical care and possible re-assement of living situation.

## 2018-10-17 NOTE — PLAN OF CARE
Problem: Patient Care Overview  Goal: Plan of Care/Patient Progress Review  Discharge Planner SLP   Patient plan for discharge: Did not discuss  Current status: Pt appears to be tolerating current diet, small amount of breakfast meal consumed, nursing staff reports fatigue this AM. Pt consumed soft solids and thin liquids without concerns for aspiration, he continues to present with esophageal symptoms. Current diet continues to be most appropriate, with feeding assistance    Recommend use of reflux precautions and safe swallow strategies with a dysphagia diet level 2 and thin liquids.  Precautions/strategies include: sit at 90 degrees, remain upright for 60 minutes after eating, small bites/sips, alternate textures, multiple swallows, small meals at a sitting.  Hold po if increased aspiration signs are noted. Pt requires feeding assistance as needed    Barriers to return to prior living situation: Below baseline  Recommendations for discharge: TCU- with speech services  Rationale for recommendations: Skilled services warranted given pt's oropharyngeal and esophageal dysphagia with need for ongoing evaluation, education, and training on swallow strategies         Entered by: Nicole Calderon 10/17/2018 10:33 AM

## 2018-10-17 NOTE — DISCHARGE INSTRUCTIONS
A follow-up appointment was scheduled for you [see above].  It is very important to go to it--bring these papers and your insurance card with you.  At the visit, talk about your hospital stay.  Tell your doctor how you feel.  Show your new list of medications.  Your doctor will update records, make sure you are still doing OK, and decide if any tests or medication changes are needed.      Your doctor has ordered home care to help you after your hospital stay.  They will contact you regarding your first visit.  This service will be provided by [facility].  If you have not received a call within 48 hours of discharge, please call them at [phone].

## 2018-10-17 NOTE — PROGRESS NOTES
[facility]    Palliative Care Progress Note    Salo Willis  MRN# [id number]  Date of Admission:  10/12/2018  Date of Service (when I saw the patient): 10/17/2018    Assessment & Plan   Salo Willis is a 88 year old male who was admitted on 10/12/2018. Past medical history significant for coronary artery disease, hypertension, hyperlipidemia, ischemic cardiomyopathy, chronic atrial fibrillation, PVD, dementia who was admitted to previous  [facility] [place] due to hematoma of the right hip following a suspected mechanical fall.     Discussion  Met with Mr Willis's son Kenny this afternoon, Kenny's wife Geno was on speaker phone and participated in the conversation. We reviewed the swallow evaluation and recommendations made by both the SLP and GI physician. They understand that Mr Willis's swallowing dysfunction may be related to progression of his dementia. We discussed the option of returning to his assisted living facility with bolstered services (as he has done in the past with success) including PT/OT/SLP and see how he does in the coming days/weeks/months? We discussed if he does not seem to be improving or is continuing to struggle with swallowing and therefore not getting sufficient nutrition, it may be beneficial to have the support of the hospice team. Kenny and Geno both verbalized understanding and were in agreement with this plan. I relayed this information to unit KALEY Washburn and hospitalist Deandre Polanco PA-C.    Support/Coping  -pt's son Kenny at bedside   -Will involve Palliative LICSW, Chikis Ellison, and/or Palliative , Magy White    Decisional Support, Goals of Care, Counseling & Coordination  Decisional Capacity Intact?  -No  Health Care Directive on File?  -No  Code Status/Resuscitation Preferences?  -DNR/DNI  Plan of Care?  -discharge back to [facility] with additional services     Thank you for involving us in the care of this patient and family. We  will continue to follow. Please do not hesitate to contact me with questions or concerns or the on-call provider for our team if evening or weekend.    Allie GUERIN, GRICELDA  Palliative Medicine   Pager [phone]    Attestation:  Total time on the floor involved in the patient's care:  45 minutes  Total time spent in counseling/care coordination: >50%  Non Face to Face time = 1150 - 1235    Interval History   Did not meet with Mr Willis today, conversation regarding plan of care was with his son Kenny and daughter in law Geno via phone.    Medications   Current Facility-Administered Medications Ordered in Epic   Medication Dose Route Frequency Last Rate Last Dose     acetaminophen (TYLENOL) tablet 1,000 mg  1,000 mg Oral TID PRN   1,000 mg at 10/15/18 1830     alum & mag hydroxide-simethicone (MYLANTA ES/MAALOX  ES) suspension 30 mL  30 mL Oral Q4H PRN   30 mL at 10/15/18 1613     artificial saliva (BIOTENE DRY MOUTHWASH) liquid 15 mL  15 mL Swish & Spit 4x Daily   15 mL at 10/16/18 1708     aspirin EC tablet 81 mg  81 mg Oral Daily   81 mg at 10/17/18 1000     atorvastatin (LIPITOR) tablet 10 mg  10 mg Oral Daily   10 mg at 10/17/18 1000     carvedilol (COREG) tablet 3.125 mg  3.125 mg Oral BID   3.125 mg at 10/17/18 1000     docusate sodium (COLACE) capsule 100 mg  100 mg Oral QPM   100 mg at 10/16/18 1951     influenza Vac Split High-Dose (FLUZONE) injection 0.5 mL  0.5 mL Intramuscular Prior to discharge         lisinopril (PRINIVIL/Zestril) tablet 2.5 mg  2.5 mg Oral Daily   2.5 mg at 10/17/18 1000     melatonin tablet 1 mg  1 mg Oral At Bedtime PRN         mirtazapine (REMERON) tablet TABS 7.5 mg  7.5 mg Oral At Bedtime   7.5 mg at 10/16/18 1951     naloxone (NARCAN) injection 0.1-0.4 mg  0.1-0.4 mg Intravenous Q2 Min PRN         ondansetron (ZOFRAN-ODT) ODT tab 4 mg  4 mg Oral Q6H PRN   4 mg at 10/14/18 2025    Or     ondansetron (ZOFRAN) injection 4 mg  4 mg Intravenous Q6H PRN   4 mg at 10/13/18 1420      oxyCODONE IR (ROXICODONE) half-tab 2.5 mg  2.5 mg Oral Q4H PRN   2.5 mg at 10/14/18 2019     polyethylene glycol (MIRALAX/GLYCOLAX) Packet 17 g  17 g Oral Daily   17 g at 10/17/18 1002     prochlorperazine (COMPAZINE) injection 5-10 mg  5-10 mg Intravenous Q6H PRN   5 mg at 10/13/18 2228    Or     prochlorperazine (COMPAZINE) tablet 5-10 mg  5-10 mg Oral Q6H PRN        Or     prochlorperazine (COMPAZINE) Suppository 12.5 mg  12.5 mg Rectal Q12H PRN         ranitidine (ZANTAC) tablet 150 mg  150 mg Oral At Bedtime   150 mg at 10/16/18 2117     rivastigmine (EXELON) capsule 1.5 mg  1.5 mg Oral BID w/meals   1.5 mg at 10/17/18 1000     scopolamine (TRANSDERM) 72 hr patch 1 patch  1 patch Transdermal Q72H   1 patch at 10/17/18 0315    And     scopolamine (TRANSDERM-SCOP) Patch in Place   Transdermal Q8H        And     scopolamine (TRANSDERM-SCOP) patch REMOVAL   Transdermal Q72H         sennosides (SENOKOT) tablet 1-2 tablet  1-2 tablet Oral BID   1 tablet at 10/17/18 1000     traZODone (DESYREL) tablet 50 mg  50 mg Oral At Bedtime   50 mg at 10/16/18 1952     No current Epic-ordered outpatient prescriptions on file.       Physical Exam   Temp: 98.4  F (36.9  C) Temp src: Axillary BP: 119/80 Pulse: 85 Heart Rate: 90 Resp: 16 SpO2: 98 % O2 Device: None (Room air)    Vitals:    10/14/18 0500 10/16/18 0500 10/17/18 0700   Weight: 51.1 kg (112 lb 11.2 oz) 63.4 kg (139 lb 12.8 oz) 54 kg (119 lb)     No exam today    Data   No results found for this or any previous visit (from the past 24 hour(s)).

## 2018-10-17 NOTE — CONSULTS
"Care Transition Initial Assessment - RN    Met with: Patient but he was sleeping.  Called: daughter Geno.    DATA   Active Problems:    Hematoma       Cognitive Status: not awake, and documented to have dementia.  Called daughter   Contact information and PCP information verified: Yes, [facility] provider  Lives With: alone  Living Arrangements: assisted living   Insurance concerns: No Insurance issues identified     ASSESSMENT  Patient currently receives the following services:  None, previously [facility] but not active.     Identified issues/concerns regarding health management:   + General failing health, see Palliative note for family goals  + Noted pt has discharge orders/plans for home care including Home PT/OT/ST/RN.  Daughter was given choice in selecting homecare and chose [facility], states they hope Laureen Davey can come out again. I relayed this to the [facility] team.     PLAN  Financial costs for the patient include none stated, daughter explains they previously purchased a hospital bed.    Patient given options and choices for discharge Yes, [facility] referral sent 10/17/18 and confirmed.  Provided contact information on AVS for pt to call if they have questions for [facility]: [phone] .    Patient/family is agreeable to the plan?  Yes:  + Return to prior facility with Homecare  + Meds need to be filled at facility (not at hospital)  + Daughter states pt will need stretcher van ride  + Daughter says she was hoping discharge could be 11am tomorrow; I told her I would update her once I learn the plan.  Daughter believes the DON at facility prefers return time before 3pm.     Patient anticipates discharging to [facility].   + Called [facility] ([phone]) and asked for EF[facility] Black  + EFREM Black is not in today--Josie, the nurse covering her is \"very busy.\"   +  took my number and will have someone call me back.   + Callback " received.  Normally they do AM/PM cares for patient, assist him with dressing, and assist with showering 3x per week.  However, when I verbally reviewed the PT note, Josie indicates they do anticipate increasing his level of care.  Clarification received with second phone call; they could take pt today but would be unable to do the nursing assessment--due to pt's change in condition, it is safest for pt to discharge tomorrow 10/18/2018 when RN on duty can do nursing assessment--daughter aware.  Ride scheduled (see bottom of note).    + Faxed discharge orders thus far to Attn Josie [phone] (done at 1600)     Patient anticipates needs for home equipment: No, daughter states pt already has a hospital bed.  Plan/Disposition: [facility]  Appointments: scheduled,       PCP   Danica Lomax, should see pt at pt's building.    Heart Appointment:    Oct 23, 2018 10:00 AM CDT   LAB with [facility]      Oct 23, 2018 10:50 AM CDT   Core Return with Yanni Baker PA-C   [facility]     Obtained ride:   [facility]ret[facility] at 11:00am   From [facility] room [address] to [address] [place] [address]    ADDENDUM:  Knowing pt's daughter was arriving to visit at [facility] at 4pm, I stopped by pt's room.  Daughter indicates pt seems different from yesterday.  Additionally, there evidently is a 1:1 attendant at pt's bedside (I myself was unaware of sitter).   She states she described pt's presentation as it was on Sunday to Josie at the [facility], but she feels she provided an inaccurate picture to Josie.  She would like to express her concerns to pt's provider.  Obtained pt's nurse Ale and NAZ Carrera, who (along with KALEY Washburn) went to meet with pt's daughter.  I updated Josie at [facility] to hold filling the meds, and to check in on the case in the AM.    Care  (CTS) will continue to follow as needed.    Maribeth Rueda RN, BSN  [facility] Care Coordinator   Mobile Phone:  [phone]

## 2018-10-17 NOTE — PLAN OF CARE
Problem: Patient Care Overview  Goal: Plan of Care/Patient Progress Review  Discharge Planner PT   Patient plan for discharge: unable to state  Current status: Pt with sitter at bedside. Pt agreeable to PT session. Following directions for mobility about 50% of the time.  Pt requires max A x 2 for bed repositioning with cues to assist with modified bridging of left leg. Rolling left and right x 2 reps each with cues for use of railing mod A x 2 assist. Supine to sit with max A x 2 with cues to utilize railing, max A x 2 for seated scooting. Pt has difficulty maintaining midline sitting, tends to lean left and prop his left forearm on the bed (most likely to off load right hip due to pain). Pt requires mod A for maintaining sitting balance with cues for midline positioning. Only able to tolerate 4-5 min sitting at EOB before requesting to lie back down due to fatigue.  Max A x 2 with sit to supine. Intermittent pain reports with right LE movement.   Barriers to return to prior living situation: A x 1-2 for mobility, limited tolerance to OOB activity   Recommendations for discharge: TCU   Rationale for recommendations: Pt will require TCU stay as pt requires assist with all mobility at this time, has significant fall history, and demonstrates impaired tolerance to OOB activity. If pt returns to [facility] will need 24/7 assist with mobility and HHPT to progress functional mobility        Entered by: Monika Landry 10/17/2018 10:53 AM

## 2018-10-18 NOTE — DISCHARGE SUMMARY
Admit Date:     10/12/2018   Discharge Date:           PRIMARY CARE PROVIDER:  The patient is followed by [facility].      DATE OF ADMISSION:  10/12/2018      DATE OF DISCHARGE:  10/18/2018      DISCHARGE DIAGNOSES:   1.  Right hip hematoma thought to be secondary to unwitnessed mechanical fall.   2.  Anemia thought to be secondary to acute blood loss.   3.  Type 2 non-ST-segment elevation myocardial infarction in the setting of known coronary artery disease, status post 4-vessel CABG with associated ischemic cardiomyopathy, status post implantable cardioverter defibrillator placement, hypertension and hyperlipidemia.   4.  Chronic atrial fibrillation.   5.  Dementia.   6.  Dysphagia/odynophagia.   7.  Constipation.   8.  Questionable urinary tract infection.      DISCHARGE MEDICATIONS:       Review of your medicines      START taking       Dose / Directions    artificial saliva Liqd liquid   Used for:  History of dementia        Dose:  15 mL   Swish and spit 15 mLs in mouth 4 times daily   Quantity:  1000 mL   Refills:  0       ondansetron 4 MG ODT tab   Commonly known as:  ZOFRAN ODT   Used for:  Nausea        Dose:  8 mg   Take 2 tablets (8 mg) by mouth every 8 hours as needed for nausea   Quantity:  20 tablet   Refills:  0       polyethylene glycol Packet   Commonly known as:  MIRALAX/GLYCOLAX   Used for:  Constipation, unspecified constipation type        Dose:  17 g   Take 17 g by mouth daily   Quantity:  30 packet   Refills:  0       sucralfate 1 GM tablet   Commonly known as:  CARAFATE   Used for:  Dysphagia, unspecified type        Dose:  1 g   Take 1 tablet (1 g) by mouth 4 times daily   Quantity:  40 tablet   Refills:  1         CONTINUE these medicines which may have CHANGED, or have new prescriptions. If we are uncertain of the size of tablets/capsules you have at home, strength may be listed as something that might have changed.       Dose / Directions    furosemide 20 MG tablet    Commonly known as:  LASIX   This may have changed:    - when to take this  - reasons to take this   Used for:  Ischemic cardiomyopathy        Dose:  20 mg   Take 1 tablet (20 mg) by mouth daily as needed (Administer if daily weight increased greater than 3 pounds and/or 5 pounds in a week.)   Quantity:  90 tablet   Refills:  0       ranitidine 150 MG tablet   Commonly known as:  ZANTAC   This may have changed:  when to take this   Used for:  Gastroesophageal reflux disease without esophagitis        Dose:  150 mg   Take 1 tablet (150 mg) by mouth 2 times daily (before meals)   Quantity:  60 tablet   Refills:  0         CONTINUE these medicines which have NOT CHANGED       Dose / Directions    acetaminophen 500 MG tablet   Commonly known as:  TYLENOL        Dose:  1000 mg   Take 1,000 mg by mouth 3 times daily as needed for pain   Refills:  0       aspirin 81 MG EC tablet   Used for:  S/P CABG (coronary artery bypass graft)        Dose:  81 mg   Take 1 tablet (81 mg) by mouth daily   Quantity:  90 tablet   Refills:  3       atorvastatin 10 MG tablet   Commonly known as:  LIPITOR        Dose:  10 mg   Take 10 mg by mouth daily   Refills:  0       carvedilol 6.25 MG tablet   Commonly known as:  COREG        Dose:  3.125 mg   Take 3.125 mg by mouth 2 times daily 6.25 mg x 0.5 tabs= 3.125mg   Refills:  0       cholecalciferol 1000 units capsule   Commonly known as:  vitamin  -D        Dose:  2 capsule   Take 2 capsules by mouth daily   Refills:  0       lisinopril 2.5 MG tablet   Commonly known as:  PRINIVIL/Zestril   Used for:  S/P CABG (coronary artery bypass graft), Chronic atrial fibrillation (H)        Dose:  2.5 mg   Take 1 tablet (2.5 mg) by mouth daily   Quantity:  90 tablet   Refills:  3       mirtazapine 7.5 mg Tabs   Commonly known as:  REMERON        Dose:  7.5 mg   Take 7.5 mg by mouth At Bedtime   Refills:  0       oxyCODONE HCl 5 MG Taba   Commonly known as:  OXAYDO        Dose:  2.5 mg   Take 2.5 mg by  mouth every 4 hours as needed (pain) 5mg tab x 0.5 tab = 2.5mg   Refills:  0       potassium chloride SA 10 MEQ CR tablet   Commonly known as:  K-DUR/KLOR-CON M        Dose:  10 mEq   Take 10 mEq by mouth every evening   Refills:  0       rivastigmine 1.5 MG capsule   Commonly known as:  EXELON        Dose:  1.5 mg   Take 1.5 mg by mouth 2 times daily (with meals)   Refills:  0       STOOL SOFTENER 100 MG tablet   Generic drug:  docusate sodium        Dose:  100 mg   Take 100 mg by mouth every evening   Refills:  0       traZODone 50 MG tablet   Commonly known as:  DESYREL        Dose:  50 mg   Take 50 mg by mouth At Bedtime   Refills:  0         STOP taking          clopidogrel 75 MG tablet   Commonly known as:  PLAVIX                Where to get your medicines      These medications were sent to [facility] [address]  [address] [place] [id number]     Phone:  [phone]      artificial saliva Liqd liquid     furosemide 20 MG tablet     ondansetron 4 MG ODT tab     polyethylene glycol Packet     ranitidine 150 MG tablet     sucralfate 1 GM tablet             ALLERGIES:  NIACIN.      FOLLOWUP WITH RECOMMENDATIONS:   1.  The patient will follow up with [facility] within 1 week to evaluate medication changes and for hospital followup.  Recommend checking a CBC and basic metabolic panel.   2.  Follow up with cardiology per their recommendation, it appears an appointment has been scheduled for 10/23.      ACTIVITY:  As tolerated.      DIET:  The patient will discharge with a combination dysphagia diet level 2, mechanical altered with thin liquids.  Recommendations for a low-salt, low-fat, minimal caffeine, no straws and to supplement meals with shakes.      ADDITIONAL DISCHARGE INSTRUCTIONS:  The patient should be taking medications with 8 ounces of water.      ADDITIONAL SERVICES:  The patient will have referrals made for home RN,  home PT, home OT and home speech language pathology.      CONSULTATIONS:   1.  Cardiology.   2.  PT/OT/speech language pathology.   3.  Social work/care coordination.   4.  GI.       LABORATORY, IMAGING AND PROCEDURES:   1.  Routine laboratory studies that included CBC with platelet differential, a basic metabolic panel, blood glucose levels.  CRP inflammatory marker, ESR.  Serial troponin, INR.  Occult blood of the stool.  Blood cultures x 2.  Lactic acid level.  ProBNP, PTT.  UA with urine culture.  Serial hemoglobin.  Serial troponin.  A comprehensive metabolic panel, a CBC with platelets.  Blood type and screen.  Repeat basic metabolic panel.   2.  Right hip CT without contrast.   3.  Right femur x-ray, 2 views.   4.  Chest, abdomen and pelvis CT with contrast.   5.  Head CT without contrast.   6.  One-view portable chest x-ray.   7.  X-ray video swallow study with esophagram.   8.  EKG x 3.   9.  Complete echocardiogram.      PENDING RESULTS:  Blood cultures are in preliminary results with no growth after 5 days.      PHYSICAL EXAMINATION:  On day of discharge:   VITAL SIGNS:  Temperature is 98 degrees Fahrenheit with a blood pressure of 106/48, heart rate of 70 beats per minute, respiratory rate of 16, O2 saturations are 94% on room air.   GENERAL:  The patient is asleep upon arrival, awakens with gentle pressure applied to the shoulder, cooperative, in no apparent distress, alert and oriented x 1.   HEENT:  Normocephalic, atraumatic.  Moist mucous membranes present.  No exudates noted in the posterior pharynx.  Uvula is midline.   NECK:  Supple, normal range of motion.  No tracheal deviation.  No cervical lymphadenopathy present.   CARDIOVASCULAR:  Regular rate and rhythm.  No rubs, murmurs or gallops appreciated.   PULMONARY:  Lungs are clear to auscultation bilaterally.  No wheezes, rhonchi or rales appreciated.   GASTROINTESTINAL:  Bowel sounds are present in all 4 quadrants.  Soft, nontender,  nondistended.   NEUROLOGIC:  Cranial nerves II-XII appear grossly intact.   EXTREMITIES:  No lower extremity edema noted bilaterally.  Calves are nontender to palpation.      BRIEF HISTORY OF PRESENT ILLNESS:  Salo Willis is an 88-year-old male with past medical history significant for coronary artery disease, hypertension, hyperlipidemia, ischemic cardiomyopathy, chronic atrial fibrillation, peripheral vascular disease and dementia who was admitted to [facility] due to right lower extremity hematoma development following a mechanical fall that was unwitnessed and subsequent NSTEMI.      HOSPITAL COURSE:   1.  Right hip hematoma, thought to be secondary to a mechanical fall that was unwitnessed:  The patient underwent imaging workup in the emergency department including a right hip CT, right femur x-rays that were all negative for fractures.  The patient did receive a unit of packed red blood cells and worked with PT and OT and was continued on a baby aspirin, which will be continued at time of discharge.  The patient will be discharging home with continued therapies.   2.  Anemia thought to be secondary to acute blood loss in the setting of right hip hematoma:  Again, the patient received a unit of packed red blood cells.  Hemoglobin was monitored closely, last hemoglobin being drawn 2 days ago, but had been stable for several days at around 9.  The patient was continued on aspirin therapy during this stay and will be resumed at time of discharge.   3.  Type 2 NSTEMI in the setting of known coronary artery disease, status post 4-vessel CABG with associated ischemic cardiomyopathy status post ICD placement, hypertension and hyperlipidemia:  The patient's initial EKG revealed chronic atrial fibrillation with right bundle branch block, Q-waves noted in leads II, III and aVF.  Serial troponins were monitored which showed elevation peaking at 0.7, but showing normalizing trend with the last troponin  being 0.174.  An echocardiogram was performed which showed a severely dilated left ventricle with an EF of 50-55% and global hypokinetic areas with abnormal septal wall motion consistent with conduction abnormalities, otherwise no specific regional wall motion abnormalities are noted.  The patient remained stable and denied chest pain during his stay.  He was evaluated by cardiology who recommended medical management and avoiding heparin and Plavix due to his propensity for bleeding and noted anemia and hematoma.  As such, the patient was continued on a baby aspirin daily, carvedilol 3.125 mg twice daily and lisinopril 2.5 mg daily, which will be continued at the time of discharge.  After discussion with cardiology, internal medicine and palliative care patient has been transitioned to DNR/DNI and family are considering moving towards hospice in the near future and turning off his ICD.  The patient will follow up with cardiology per their recommendations.   4.  Chronic atrial fibrillation:  This remained stable throughout the stay.  The patient was monitored on continuous telemetry and continued on a baby aspirin daily and Coreg twice daily, which will be resumed at time of discharge.   5.  Dementia:  The patient has had waxing and waning mentation throughout hospitalization.  He is planning to return to his previous facility, but instead of going to assisted living portion will be going to the memory care unit.  He was continued on his prior to admit Exelon, which will be continued at time of discharge and Biotene was also ordered and will be prescribed at time of discharge.  Palliative care was consulted and worked with the family and progressed to changing code status to DNR/DNI and open to ongoing discussions regarding hospice in the near future.   6.  Dysphagia/odynophagia:  The patient was noted to have some grimacing with swallowing pills and sometimes with food.  This was also noted by family members who  stated that this was a new development.  He underwent evaluation by PT, OT and speech language pathology with a noted video swallow study showing decreased oral phase control with delayed swallows and decreased epiglottic inversion and reflux precautions being requested or advised.  He has been placed on a dysphagia diet level 2 with thin liquids, which we will continue at the time of discharge.  The patient was also evaluated by GI service with plans to not proceed with an EGD and recommend medical management.  The patient's prior to admit Zantac has been increased to 150 mg 2 times daily and have also ordered additional sucralfate to be given 4 times daily before meals.  Question whether or not patient is developing pill-induced esophagitis versus progression of dementia with neuromuscular decrease of control with swallowing due to his progressing dementia.  The patient will continue working with all therapies at the time of discharge in the home.   7.  Constipation:  This slightly improved.  He was started on MiraLax daily and Senokot 2 times daily and will be discharged with MiraLax daily.   8.  Suspected urinary tract infection:  Initial urinalysis noted large leukocyte esterase with small amount of white blood cells of 153 and hyaline casts.  The patient received 2 days of ceftriaxone while urine culture was then process.  Following results of urine culture being negative, antibiotic therapy was discontinued.      CODE STATUS:  DNR/DNI.      The patient was discussed with Dr. Knott who agrees with discharge at this time.  Dr. Knott will evaluate the patient independently.      Total discharge time greater than 30 minutes.         SAVITA KNOTT MD       As dictated by JANINE NEWTON PA-C            D: 10/18/2018   T: 10/18/2018   MT: EVELIO      Name:     CARI HICKMAN   MRN:      [id number]        Account:        [id number]   :      1930           Admit Date:     10/12/2018                                   Discharge Date:       Document: [id number]       cc: [facility]

## 2018-10-18 NOTE — PLAN OF CARE
Problem: Patient Care Overview  Goal: Plan of Care/Patient Progress Review  Outcome: Adequate for Discharge Date Met: 10/18/18  Pt is A/O to self. Strong assist of 2. Stand and pivot to commode. Pt discharged this afternoon back to [facility] facility. AVS paperwork sent with [facility] staff to give to facility staff at [facility]. Pt VSS upon discharge. Pt stable upon discharge.

## 2018-10-18 NOTE — PROGRESS NOTES
"SW:  D:  Had a lengthy conversation with patient's son and daughter-in-law, Kenny and Geno, along with Edenilson, regarding discharge plans.  Patient was admitted from [facility].  Patient was scheduled to discharge at 11:00 today when his family came in last night that patient was no where near his baseline.  Patient's family was concerned when patient was not sitting up in a chair, when they found out he was not participating in therapy.  When they found out he was combative.  Discussed this is likely the progressing of his disease process.  Explained that patient's facility may not accept him back in the condition that he is in.  Explained that he is currently receiving an assist of two which means that two people need to be with him when he transfers and moves.  Explained that I would need to contact the facility to see if they can manage that level of care.  Explained that they likely can manage that level of care of patient was receiving hospice cares.  Patient's son states that they are familiar with hospice as his mother was receiving hospice care.  It does not sound like they had a good experience for her, but it sounds as if there were some other factors involved in that and she was in [place].  Patient's daughter-in-law states that patient was in the memory care unit and \"graduated\" to the regular assisted living.  Explained that he may need to go back to the memory care unit again as they have more care there.  Patient's son was leaning more towards hospice whereas the daughter-in-law was wavering.  She would like him to have therapy and have him come back and forth to the hospital.  Encouraged them to think about things and we and we can make the decision.  Explained that I need to contact the DON at the facility to discuss patient's change in his level of care.  Call placed to Sofia, [phone].  Discussed the options.  She thinks it would be best if patient moved back " to the memory care unit.  She feels it would be beds if patient enrolls in hospice care.  She states she has been speaking with the family about the prior to his admission.  She states that the timing of his admission depends on whether patient is hospice or not.  If he is hospice they will deliver the bed, if he is not they will have to move patient's bed so it would be dependent on when the bed can be moved.  Sofia is offering to contact Geno to discuss further as this would involve a move for patient.  Receive a call back from Sofia.  They are moving patient to memory care as we speak.  They would like to wait on hospice and have patient return with RN/PT/OT/HHA.  Referral sent to [facility] and process explained.  Call placed to [facility] to set up a stretcher ride for 14:00 today as patient is lethargic, he is confused, and is not safe to be alone in the back of the rig  [facility]  Call placed to update Sofia from [facility] and faxed the orders.  Call placed to update patient's daughter-in-law and she is in agreement to the plan.

## 2018-10-18 NOTE — PLAN OF CARE
Problem: Patient Care Overview  Goal: Plan of Care/Patient Progress Review  Discharge Planner PT   Patient plan for discharge: does not state  Current status: Pt sitting up in chair at time of arrival.  Agreeable to PT session with encouragement. Pt requires mod A x 2 with sit to stand from chair and EOB with FWW, mod A x 2 with stand step transfers with FWW and cues for technique. Max A x 2 for bed mobility. Pt declines ex during session.   Barriers to return to prior living situation: A x 1-2 for mobility, limited tolerance to OOB activity   Recommendations for discharge: TCU   Rationale for recommendations: Pt will require TCU stay as pt requires assist with all mobility at this time, has significant fall history, and demonstrates impaired tolerance to OOB activity. If pt returns to intermediate will need 24/7 assist with mobility and HHPT to progress functional mobility        Entered by: Monika Landry 10/18/2018 9:26 AM         Physical Therapy Discharge Summary    Reason for therapy discharge:    Discharged to home with home therapy.    Progress towards therapy goal(s). See goals on Care Plan in [facility] electronic health record for goal details.  Goals not met.  Barriers to achieving goals:   discharge from facility.    Therapy recommendation(s):    Continued therapy is recommended.  Rationale/Recommendations:  to maximize his level of IND mobility and decrease level of burden of care. .

## 2018-10-18 NOTE — PLAN OF CARE
Problem: Patient Care Overview  Goal: Plan of Care/Patient Progress Review  Occupational Therapy Discharge Summary    Reason for therapy discharge:    Discharged to home with home therapy.    Progress towards therapy goal(s). See goals on Care Plan in [facility] electronic health record for goal details.  Goals not met.  Barriers to achieving goals:   limited tolerance for therapy.    Therapy recommendation(s):    Continued therapy is recommended.  Rationale/Recommendations:  To maximize ADL I..

## 2018-10-19 NOTE — PROGRESS NOTES
Speech Language Therapy Discharge Summary    Reason for therapy discharge:    Discharged to home with home therapy.    Progress towards therapy goal(s). See goals on Care Plan in Epic electronic health record for goal details.  Goals partially met.  Barriers to achieving goals:   discharge from facility.    Therapy recommendation(s):    Continued therapy is recommended.  Rationale/Recommendations:  Recommend follow up SLP services. Recommended diet at discharge: dysphagia diet level 2 and thin liquids.  Precautions/strategies include: sit at 90 degrees, remain upright for 60 minutes after eating, small bites/sips, alternate textures, multiple swallows, small meals at a sitting, esophageal dysphagia strategies/precautions.

## 2018-10-19 NOTE — PROGRESS NOTES
Asked by Discharge RN to contact pt's [facility] to discuss [facility] f/u, per chart pt has declined and is combative and now in Memory Care Unit at his [facility]. Contacted [facility], spoke with ANGELA Galindo. She reports he is back to baseline as far as mentation, states he is confused but not combative at all. She reports he is pleasantly demented but just very weak. The family as of now wants PT/OT to see if he's able to gain strength back before deciding on hospice. Josie reports that the pt has mentioned discontinuing device checks (pt has AICD) but specifically said they DO NOT want the ICD shut off. She is going to speak with the family regarding desire and appropriateness of [facility] f/u on 10/23 and have them call me to discuss.

## 2018-10-20 NOTE — PROGRESS NOTES
Dear Karen Quiroz  Medicare Home Health regulations requires [facility] to provide an initial assessment visit either within 48 hours of the patient's return home, or on the physician ordered Start of Care date.    There will be a delay in the Initial Assessment for Salo Willis; MRN [id number]  We will update you as soon as the visit completed.      Sincerely [facility]  Stevan Phan  [phone]

## 2018-11-25 PROBLEM — N39.0 UTI (URINARY TRACT INFECTION): Status: ACTIVE | Noted: 2018-01-01

## 2018-11-25 NOTE — IP AVS SNAPSHOT
"          [place] [address] ONCOLOGY: [phone]                                              INTERAGENCY TRANSFER FORM - LAB / IMAGING / EKG / EMG RESULTS   2018                    Hospital Admission Date: 2018  CARI HICKMAN   : 1930  Sex: Male        Attending Provider: Maury Foster MD     Allergies:  Niacin    Infection:  None   Service:  GENERAL MEDI    Ht:  1.676 m (5' 6\")   Wt:  76 kg (167 lb 8.8 oz)   Admission Wt:  75 kg (165 lb 5.5 oz)    BMI:  27.04 kg/m 2   BSA:  1.88 m 2            Patient PCP Information     Provider PCP Type    [facility]         Lab Results - 3 Days      Basic metabolic panel [[id number]] (Abnormal)  Resulted: 18 0800, Result status: Final result    Ordering provider: Maury Foster MD  18 0001 Resulting lab: [facility]    Specimen Information    Type Source Collected On   Blood  18 0715          Components       Value Reference Range Flag Lab   Sodium 144 133 - 144 mmol/L  FrStHsLb   Potassium 3.6 3.4 - 5.3 mmol/L  FrStHsLb   Chloride 102 94 - 109 mmol/L  FrStHsLb   Carbon Dioxide 38 20 - 32 mmol/L H FrStHsLb   Anion Gap 4 3 - 14 mmol/L  FrStHsLb   Glucose 87 70 - 99 mg/dL  FrStHsLb   Urea Nitrogen 16 7 - 30 mg/dL  FrStHsLb   Creatinine 0.72 0.66 - 1.25 mg/dL  FrStHsLb   GFR Estimate >90 >60 mL/min/1.7m2  FrStHsLb   Comment:  Non  GFR Calc   GFR Estimate If Black >90 >60 mL/min/1.7m2  FrStHsLb   Comment:  African American GFR Calc   Calcium 8.3 8.5 - 10.1 mg/dL L FrStHsLb            Potassium [[id number]]  Resulted: 18 0810, Result status: Final result    Ordering provider: Maury Foster MD  18 0000 Resulting lab: [facility]    Specimen Information    Type Source Collected On   Blood  18 0737          Components       Value Reference Range Flag Lab   Potassium 3.5 3.4 - 5.3 mmol/L  [facility]            Creatinine [[id number]]  " Resulted: 11/28/18 0810, Result status: Final result    Ordering provider: Maury Foster MD  11/28/18 0000 Resulting lab: [facility]    Specimen Information    Type Source Collected On   Blood  11/28/18 0737          Components       Value Reference Range Flag Lab   Creatinine 0.71 0.66 - 1.25 mg/dL  FrStHsLb   GFR Estimate >90 >60 mL/min/1.7m2  FrStHsLb   Comment:  Non  GFR Calc   GFR Estimate If Black >90 >60 mL/min/1.7m2  FrStHsLb   Comment:   GFR Calc            Urine Culture Aerobic Bacterial [[id number]]  Resulted: 11/27/18 1323, Result status: Final result    Ordering provider: Sathya Trinh MD  11/25/18 1109 Resulting lab: [facility]    Specimen Information    Type Source Collected On   Midstream Urine  11/25/18 1109          Components       Value Reference Range Flag Lab   Specimen Description Midstream Urine      Special Requests Specimen received in preservative   75   Culture Micro --   225   Result:         50,000 to 100,000 colonies/mL  mixed urogenital lisa  Susceptibility testing not routinely done              Basic metabolic panel [[id number]] (Abnormal)  Resulted: 11/27/18 0722, Result status: Final result    Ordering provider: Maury Fosetr MD  11/27/18 0000 Resulting lab: [facility]    Specimen Information    Type Source Collected On   Blood  11/27/18 0658          Components       Value Reference Range Flag Lab   Sodium 145 133 - 144 mmol/L H FrStHsLb   Potassium 3.8 3.4 - 5.3 mmol/L  FrStHsLb   Chloride 106 94 - 109 mmol/L  FrStHsLb   Carbon Dioxide 35 20 - 32 mmol/L H FrStHsLb   Anion Gap 4 3 - 14 mmol/L  FrStHsLb   Glucose 101 70 - 99 mg/dL H FrStHsLb   Urea Nitrogen 19 7 - 30 mg/dL  FrStHsLb   Creatinine 0.74 0.66 - 1.25 mg/dL  FrStHsLb   GFR Estimate >90 >60 mL/min/1.7m2  FrStHsLb   Comment:  Non  GFR Calc   GFR Estimate If Black >90 >60 mL/min/1.7m2  FrStHsLb    Comment:  African American GFR Calc   Calcium 8.1 8.5 - 10.1 mg/dL L FrStHsLb            Basic metabolic panel [[id number]] (Abnormal)  Resulted: 11/26/18 0756, Result status: Final result    Ordering provider: Maury Foster MD  11/26/18 0000 Resulting lab: [facility]    Specimen Information    Type Source Collected On   Blood  11/26/18 0730          Components       Value Reference Range Flag Lab   Sodium 147 133 - 144 mmol/L H FrStHsLb   Potassium 4.0 3.4 - 5.3 mmol/L  FrStHsLb   Chloride 108 94 - 109 mmol/L  FrStHsLb   Carbon Dioxide 33 20 - 32 mmol/L H FrStHsLb   Anion Gap 6 3 - 14 mmol/L  FrStHsLb   Glucose 83 70 - 99 mg/dL  FrStHsLb   Urea Nitrogen 20 7 - 30 mg/dL  FrStHsLb   Creatinine 0.75 0.66 - 1.25 mg/dL  FrStHsLb   GFR Estimate >90 >60 mL/min/1.7m2  FrStHsLb   Comment:  Non  GFR Calc   GFR Estimate If Black >90 >60 mL/min/1.7m2  FrStHsLb   Comment:  African American GFR Calc   Calcium 8.2 8.5 - 10.1 mg/dL L FrStHsLb            CBC with platelets differential [[id number]] (Abnormal)  Resulted: 11/26/18 0737, Result status: Final result    Ordering provider: Maury Foster MD  11/26/18 0000 Resulting lab: [facility]    Specimen Information    Type Source Collected On   Blood  11/26/18 0730          Components       Value Reference Range Flag Lab   WBC 7.3 4.0 - 11.0 10e9/L  FrStHsLb   RBC Count 3.14 4.4 - 5.9 10e12/L L FrStHsLb   Hemoglobin 10.1 13.3 - 17.7 g/dL L FrStHsLb   Hematocrit 32.4 40.0 - 53.0 % L FrStHsLb    78 - 100 fl H FrStHsLb   MCH 32.2 26.5 - 33.0 pg  FrStHsLb   MCHC 31.2 31.5 - 36.5 g/dL L FrStHsLb   RDW 15.1 10.0 - 15.0 % H FrStHsLb   Platelet Count 153 150 - 450 10e9/L  FrStHsLb   Diff Method Automated Method   FrStHsLb   % Neutrophils 71.3 %  FrStHsLb   % Lymphocytes 13.4 %  FrStHsLb   % Monocytes 9.0 %  FrStHsLb   % Eosinophils 5.7 %  FrStHsLb   % Basophils 0.3 %  FrStHsLb   % Immature Granulocytes 0.3 %  FrStHsLb  "  Nucleated RBCs 0 0 /100  FrStHsLb   Absolute Neutrophil 5.2 1.6 - 8.3 10e9/L  FrStHsLb   Absolute Lymphocytes 1.0 0.8 - 5.3 10e9/L  FrStHsLb   Absolute Monocytes 0.7 0.0 - 1.3 10e9/L  FrStHsLb   Absolute Eosinophils 0.4 0.0 - 0.7 10e9/L  FrStHsLb   Absolute Basophils 0.0 0.0 - 0.2 10e9/L  FrStHsLb   Abs Immature Granulocytes 0.0 0 - 0.4 10e9/L  FrStHsLb   Absolute Nucleated RBC 0.0   FrStHsLb            Testing Performed By     Lab - Abbreviation Name Director Address Valid Date Range    14 - FrStHsLb [facility] Unknown [address] [place] [address] 05/08/15 1057 - Present    75 - Unknown [facility] Unknown [address] [facility] [address] 01/15/15 1019 - Present    225 - Unknown [facility] Unknown [address] [facility] [address] 12/19/14 0954 - Present            Unresulted Labs (24h ago through future)    Start       Ordered    Unscheduled  Potassium  (Potassium Replacement - \"Standard\" - For K levels less than 3.4 mmol/L - UU,UR,UA,RH,SH,PH,WY )  CONDITIONAL (SPECIFY),   Routine     Comments:  Obtain Potassium Level for these conditions:  *IF no potassium result within 24 hours before initiation of order set, draw potassium level with next lab collect.    *2 HOURS AFTER last IV potassium replacement dose and 4 hours after an oral replacement dose.  *Next morning after potassium dose.     Repeat Potassium Replacement if necessary.    11/25/18 1611      Encounter-Level Documents:     There are no encounter-level documents.      Order-Level Documents:     There are no order-level documents.      "

## 2018-11-25 NOTE — PLAN OF CARE
Problem: Patient Care Overview  Goal: Plan of Care/Patient Progress Review  Outcome: No Change  Shift Update: Pt calm, forgetful. Painful with any movement, tylenol given. VSS. IV abx cont for probable UTI. Imaging not showing fractures. Oxygen applied d/t borderline sats while sleeping.

## 2018-11-25 NOTE — IP AVS SNAPSHOT
` `     [place] [address] ONCOLOGY: [phone]                 INTERAGENCY TRANSFER FORM - NOTES (H&P, Discharge Summary, Consults, Procedures, Therapies)   2018                    Hospital Admission Date: 2018  CARI HICKMAN   : 1930  Sex: Male        Patient PCP Information     Provider PCP Type    [facility]      History & Physicals     No notes of this type exist for this encounter.      Discharge Summaries     No notes of this type exist for this encounter.         Consult Notes      Consults by Belle Calderon LICSW at 2018  2:48 PM     Author:  Belle Calderon LICSW Service:  (none) Author Type:      Filed:  2018  2:48 PM Date of Service:  2018  2:48 PM Creation Time:  2018  2:27 PM    Status:  Signed :  Belle Calderon LICSW ()         Care Transition Initial Assessment - SW     Met with:[KH1.1]   Active Problems:    UTI (urinary tract infection)[KH1.2]       DATA  Lives With: facility resident  Living Arrangements: assisted living (memory care)  [facility] ([facility])  At baseline patient receives assistance with all activities of daily living and he can ambulate with a walker and assistance of one.  He is able to feed himself.       Identified issues/concerns regarding health management: Patient has had recent falls and based on nursing and therapy notes he is below his baseline.  He currently requires oxgyen.  Writer spoke with Josie Saucedo RN and Sofia  at [facility].  Explained to Stacey patient's need for oxygen and his pain limiting his movement.  Also explained PT was recommending TCU.  They both stated they recommend patient transfer to TCU prior to returning to [facility].         ASSESSMENT  Cognitive Status: oriented to person  Concerns to be addressed: below baseline.     PLAN  Discuss with discharge recommendation with daughter Geno and  keep Josie Saucedo at [facility] at [phone].  Financial costs for the patient includes to be determined  Patient given options and choices for discharge contact will be made with Geno  Patient/family is agreeable to the plan?    Patient Goals and Preferences:  Patient anticipates discharging to:[KH1.1]            Revision History        User Key Date/Time User Provider Type Action    > KH1.2 11/28/2018  2:48 PM Belle Calderon LICSW  Sign     KH1.1 11/28/2018  2:27 PM Belle Calderon LICSW                       Progress Notes - Physician (Notes from 11/26/18 through 11/29/18)      Progress Notes by Miguel Angel Farley LSW at 11/29/2018 12:34 PM     Author:  Miguel Angel Farley LSW Service:  Social Work Author Type:      Filed:  11/29/2018 12:36 PM Date of Service:  11/29/2018 12:34 PM Creation Time:  11/29/2018 12:34 PM    Status:  Signed :  Miguel Angel Farley LSW ()         KALEY Discharge Note:    D/I:  Received discharge orders for patient.  Patient will be retuning back to [facility] today via [facility].  KALEY faxed orders to [phone].    P:  SW will continue to assist for discharge as needed.    HENRY Claros LGSW[AH1.1]         Revision History        User Key Date/Time User Provider Type Action    > 1.1 11/29/2018 12:36 PM Miguel Angel Farley LSW  Sign            Progress Notes by Miguel Angel Farley LSW at 11/29/2018 10:17 AM     Author:  Miguel Angel Farley LSW Service:  Social Work Author Type:      Filed:  11/29/2018 10:18 AM Date of Service:  11/29/2018 10:17 AM Creation Time:  11/29/2018 10:17 AM    Status:  Signed :  Miguel Angel Farley LSW ()         KALEY Note:    D/I:  KALEY completed PCS form, faxed to [employer] and provided to [facility].    HENRY Claros LGSW[AH1.1]         Revision History        User Key Date/Time User Provider Type Action    > AH1.1 11/29/2018 10:18 AM Miguelito  KAMINI Michelle  Sign            Progress Notes by Maury Foster MD at 11/28/2018  2:01 PM     Author:  Maury Foster MD Service:  Hospitalist Author Type:  Physician    Filed:  11/28/2018  3:49 PM Date of Service:  11/28/2018  2:01 PM Creation Time:  11/28/2018  2:01 PM    Status:  Signed :  Maury Foster MD (Physician)         [facility]    Hospitalist Progress Note  Date of Service (when I saw the patient): 11/28/2018    Assessment & Plan   Salo Willis is an 88-year-old male with medical history significant for chronic A. fib, dementia, CAD and history of CABG, CHF and recurrent falls in the past , was brought to the ER for evaluation of fall.  He was found to have UTI and was admitted for further management on 11/25/18.      Urinary tract infection:   Patient did report some urinary symptoms including dysuria and frequency, UA was abnormal, had UTI in the past but did not see any positive culture, was started on Rocephin in the ER.  - Urine culture grew lisa, complete[TK1.1]d[TK1.2] 3 doses.    Recurrent falls: patient has history of recurrent falls suspected due to generalized deconditioning, suspect at this time was due to worsened weakness from underlying UTI and also possibly volume overload.  No prodrome of dizziness or palpitation.  Denies chest pain, no loss of consciousness.  Unlikely seizure.  Denies any significant worsening of pain, though he had generalized pain, which is chronic. Treating UTI as above. PT eval[TK1.1] noted, family willing to take him back to the memory care[TK1.2].  Fall precaution.  He is not on any anticoagulation at this point.[TK1.1]   - Rt ribcage pain, controlled with low dose oxycodone and tylenol, and he is tolerating it.  - encourage IS  - CXR without fracture on admision.[TK1.2]    Coronary artery disease with history of CABG and type 2 MI.   CHF with preserved EF  Patient was noted to have airway secretion and gurgling  "and he was not clearing the secretions. Also history of dysphagia. Chest x-ray appears clear. His legs are edematous, which they are always and at baseline right is more swollen than left, since vein was harvested from the Rt leg. Daughter-in-law reported on admission that swelling has worsened. He takes Lasix p.r.n.  His weight at discharge a month ago was 140 pounds. Weight here is 165 pounds now.  -Continue Lasix 20 mg IV daily, Not on O2 prior,[TK1.1] weaned down to 0.5L, will give him additional dose of 20 mg IV lasix now.   -[TK1.2]Pulse ox while ambulating[TK1.1] prior to discharge. Change lasix to PO at discharge.[TK1.2]  -Monitor intake and output[TK1.1] a[TK1.2]s able[TK1.1] and[TK1.2] weight[TK1.1].[TK1.2]  -Not following troponin, no further cardiac workup after reviewing with family.[TK1.1]   -some upper airway gurgling noted intermittently since admission, s/p SLP eval, on DDL3 with thin liquid. No overt aspiration noted.[TK1.2]     Chronic atrial fibrillation:  Patient with an underlying rhythm of atrial flutter, but rate controlled on admission.    - PTA Coreg 6.25 mg p.o. b.i.d. Continued.  - PTA aspirin, Lipitor and lisinopril continued.     Dementia.  PTA rivastigmine continued.     Hypernatremia, mild: Sodium 147-->145     DVT Prophylaxis: Pneumatic Compression Devices  Code Status: Prior    Disposition: Expected discharge:[TK1.1] tomorrow if his memory care unit accepts, it looks like ride has been set up for 1400[TK1.2]. Discussed with patient's daughter in law[TK1.1] 11/27[TK1.2]. Reviewed with ANGELA.     Maury Foster MD  Hospitalist    Interval History[TK1.1]    Patient reported increased Rt chest wall today, got oxycodone and when I reassessed, he felt \"fine\" and better. Denies dyspnea. No other acute issues.   Noted slightly increased upper airway secretion today. O2 weaned down to 1/2 LPM.[TK1.2]      -Data reviewed today: I reviewed all new labs and imaging results over the last 24 " hours. I personally reviewed no images or EKG's today.    Physical Exam   Temp: 96.8  F (36  C) Temp src: Oral BP: 117/50   Heart Rate: 68 Resp: 16 SpO2: 91 % O2 Device: Nasal cannula Oxygen Delivery: 1/2 LPM  Vitals:    11/26/18 0709 11/27/18 2335 11/28/18 0800   Weight: 75 kg (165 lb 5.5 oz) 75 kg (165 lb 5.5 oz) 75.2 kg (165 lb 12.6 oz)     Vital Signs with Ranges  Temp:  [96.8  F (36  C)-97.1  F (36.2  C)] 96.8  F (36  C)  Heart Rate:  [63-70] 68  Resp:  [16-18] 16  BP: (111-135)/(44-56) 117/50  SpO2:  [86 %-97 %] 91 %  I/O last 3 completed shifts:  In: 520 [P.O.:520]  Out: -     Constitutional: Alert, awake. Awake.   HEENT: PERRLA EOMI, mucosa is pale.  Respiratory: Bilateral equal air entry, bibasilar crackles but no wheezing, no respiratory distress.[TK1.1]   Chest; chest wall tenderness on Rt, no bruise.[TK1.2]  Cardiovascular: Regular s1s2, systolic murmur 2/6. No tachycardia.  GI: Soft, non distended, non tender, bowel tones active.  Skin/Integumen: No rash, no blister.  Pale.  Neuro/Psych:  Alert, awake, oriented to self, knows he is in hospital.  Calm and appropriate, follows verbal commands appropriately.  No cranial nerve deficit.      Medications       aspirin  81 mg Oral Daily     carvedilol  3.125 mg Oral BID w/meals     furosemide  20 mg Intravenous Daily     lisinopril  2.5 mg Oral QPM     mirtazapine  7.5 mg Oral At Bedtime     ranitidine  150 mg Oral BID AC     rivastigmine  1.5 mg Oral BID w/meals     sucralfate  1 g Oral 4x Daily     traZODone  50 mg Oral At Bedtime       Data     Recent Labs  Lab 11/28/18  0737 11/27/18  0658 11/26/18  0730 11/25/18  1045   WBC  --   --  7.3 8.0   HGB  --   --  10.1* 10.9*   MCV  --   --  103* 104*   PLT  --   --  153 172   INR  --   --   --  1.18*   NA  --  145* 147* 147*   POTASSIUM 3.5 3.8 4.0 4.3   CHLORIDE  --  106 108 108   CO2  --  35* 33* 37*   BUN  --  19 20 19   CR 0.71 0.74 0.75 0.80   ANIONGAP  --  4 6 2*   LUCIEN  --  8.1* 8.2* 8.1*   GLC  --  101*  83 105*   ALBUMIN  --   --   --  2.3*   PROTTOTAL  --   --   --  6.1*   BILITOTAL  --   --   --  0.4   ALKPHOS  --   --   --  85   ALT  --   --   --  30   AST  --   --   --  26   TROPI  --   --   --  0.048*       No results found for this or any previous visit (from the past 24 hour(s)).[TK1.1]     Revision History        User Key Date/Time User Provider Type Action    > TK1.2 11/28/2018  3:49 PM Maury Foster MD Physician Sign     TK1.1 11/28/2018  2:01 PM Maury Foster MD Physician             Progress Notes by Miguel Angel Farley LSW at 11/28/2018  2:54 PM     Author:  Miguel Angel Farley LSW Service:  Social Work Author Type:      Filed:  11/28/2018  3:27 PM Date of Service:  11/28/2018  2:54 PM Creation Time:  11/28/2018  2:54 PM    Status:  Addendum :  Miguel Angel Farley LSW ()          Note:    D/I:  SW placed call to patient's daughter Geno to discuss therapy recommendations for memory care TCU placement.  Daughter stated that she would like to have patient return back to [facility] with  Home Care as he likes the home care staff and his facility.[AH1.1]  Daughter stated that patient would need transportation arranged back to the facility and that patient will need to be back between 10 am - 3 pm per facility request. Call placed to sam Black ([phone]) to discuss discharge plans and therapy recommendations.  Left VM.      Call placed to [facility] to set up a stretcher ride as patient has pain, confusion, would not be able to manage oxygen and is not safe to be alone in the back of the transport vehicle.[AH1.2]  Ride established for 14:00 on 11/29/18.[AH1.3]     Addendum: KALEY sent email to home care liaison to inform of discharge plan.[AH1.4]     P:  SW will continue to follow and assist for discharge.    HENRY Claros, LGSW[AH1.3]         Revision History        User Key Date/Time User Provider Type Action    > AH1.4 11/28/2018  3:27 PM  "MiguelA ngel Farley LSW  Addend     AH1.3 11/28/2018  3:19 PM Miguel Angel Farley LSW  Sign     AH1.2 11/28/2018  3:03 PM Miguel Angel Farley LSW       AH1.1 11/28/2018  2:54 PM Miguel Angel Farley LSW              Progress Notes by Leslie White PT at 11/27/2018  3:59 PM     Author:  Leslie White PT Service:  Acute IP Rehab Author Type:  Physical Therapist    Filed:  11/27/2018  3:59 PM Date of Service:  11/27/2018  3:59 PM Creation Time:  11/27/2018  3:59 PM    Status:  Signed :  Leslie White PT (Physical Therapist)          11/27/18 9525   Quick Adds   Type of Visit Initial PT Evaluation   Living Environment   Lives With facility resident   Living Arrangements assisted living  (memory care)   Home Accessibility no concerns   Number of Stairs to Enter Home 0   Number of Stairs Within Home 0   Living Environment Comment lives at [facility] at baseline   Self-Care   Usual Activity Tolerance moderate   Current Activity Tolerance poor   Equipment Currently Used at Home walker, rolling   Activity/Exercise/Self-Care Comment patient needing assist for mobility but at times getting up with walker without assist and falling   Functional Level Prior   Ambulation 1-->assistive equipment  (walker)   Transferring 3-->assistive equipment and person   Toileting 3-->assistive equipment and person   Bathing 2-->assistive person   Dressing 2-->assistive person   Cognition 1 - attention or memory deficits  (dementia)   Fall history within last six months yes   Number of times patient has fallen within last six months 6  (per chart; patient unable to state other than \"a lot\")   Which of the above functional risks had a recent onset or change? fall history   Prior Functional Level Comment patient was supposed to have assist with mobility but has fallen multiple times when getting up without assist   General Information   Onset of Illness/Injury or Date of Surgery - Date 11/25/18 "   Referring Physician Maury Foster MD   Patient/Family Goals Statement not stated   Pertinent History of Current Problem (include personal factors and/or comorbidities that impact the POC) per chart: Salo Willis is an 88-year-old male with medical history significant for chronic A. fib, dementia, CAD and history of CABG, CHF and recurrent falls in the past , was brought to the ER for evaluation of fall.  He was found to have UTI and was admitted for further management on 11/25/18; see medical record for further information   Precautions/Limitations fall precautions;oxygen therapy device and L/min  (2L)   General Observations patient sleeping upon therapist arrival; agreeable to PT eval upon waking   General Info Comments Activity: up with assist   Cognitive Status Examination   Orientation orientation to person, place and time   Level of Consciousness alert   Follows Commands and Answers Questions 75% of the time   Personal Safety and Judgment impaired;at risk behaviors demonstrated   Memory impaired   Cognitive Comment baseline dementia   Pain Assessment   Patient Currently in Pain Yes, see Vital Sign flowsheet  (R sided rib pain with transition movements)   Posture    Posture Comments forward flexed in standing   Range of Motion (ROM)   ROM Comment UE's limited to about 90 degrees; Guarding of R arm movement secondary to R sided rib pain; some difficulty actively moving LE's to command   Strength   Strength Comments functional weakness from recent illness; needing significant assist for mobility; further limited by pain   Bed Mobility   Bed Mobility Comments max A for supine to sit; dependent assist to return to supine   Transfer Skills   Transfer Comments sit>stand with bed elevated and mod A; max A x 2 from standard height bed and chair height surface   Gait   Gait Comments Able to ambulate in room x 10 feet with a rolling walker and min A; SBA of another for safety   Balance   Balance Comments  "baseline impairment; improved with walker use; multiple recent falls   General Therapy Interventions   Planned Therapy Interventions bed mobility training;gait training;ROM;strengthening;transfer training;progressive activity/exercise   Clinical Impression   Criteria for Skilled Therapeutic Intervention yes, treatment indicated   PT Diagnosis impaired gait/transfers; weakness   Influenced by the following impairments weakness; R sided rib pain; impaired cognition; decreased activity tolerance; impaired balance   Functional limitations due to impairments impaired independence with functional mobility   Clinical Presentation Stable/Uncomplicated   Clinical Presentation Rationale max A of 1-2 for mobility; current environment not able to provide cares needed; medically stabilizing   Clinical Decision Making (Complexity) Low complexity   Therapy Frequency` 5 times/week   Predicted Duration of Therapy Intervention (days/wks) 3-5 days   Anticipated Equipment Needs at Discharge front wheeled walker;wheelchair   Anticipated Discharge Disposition Transitional Care Facility   Risk & Benefits of therapy have been explained Yes   Patient, Family & other staff in agreement with plan of care Yes   [facility] TM \"6 Clicks\"   2016, Trustees of [facility], under license to [employer].  All rights reserved.   6 Clicks Short Forms Basic Mobility Inpatient Short Form   [facility]  \"6 Clicks\" V.2 Basic Mobility Inpatient Short Form   1. Turning from your back to your side while in a flat bed without using bedrails? 2 - A Lot   2. Moving from lying on your back to sitting on the side of a flat bed without using bedrails? 2 - A Lot   3. Moving to and from a bed to a chair (including a wheelchair)? 2 - A Lot   4. Standing up from a chair using your arms (e.g., wheelchair, or bedside chair)? 2 - A Lot   5. To walk in hospital room? 3 - A Little   6. Climbing 3-5 steps with a railing? 1 - Total   Basic " Mobility Raw Score (Score out of 24.Lower scores equate to lower levels of function) 12   Total Evaluation Time   Total Evaluation Time (Minutes) 10[SL1.1]        Revision History        User Key Date/Time User Provider Type Action    > SL1.1 11/27/2018  3:59 PM Leslie White PT Physical Therapist Sign            Progress Notes by Lamar Mcgowan RN at 11/27/2018 11:49 AM     Author:  Lamar Mcgowan RN Service:  Care Coordinator Author Type:      Filed:  11/27/2018  3:13 PM Date of Service:  11/27/2018 11:49 AM Creation Time:  11/27/2018 11:49 AM    Status:  Addendum :  Lamar Mcgowan RN ()         Per chart review, pt resides at [facility].  Called Sofia TOPETE at [facility] and left message regarding pt's current care level and services he receives at the [facility]  Awa call back.[GO1.1]      Addendum: Received call back from Sofia at [facility].  Per Sofia, pt receives assistance with all cares.  Pt is able to feed self.  Per Sofia they are able to take patient back with resumption of [facility] RN & PT/OT in place.  Per Sofia, any new medications will need to be filled at [facility], discharge orders need faxed to [phone].[GO1.2]       Revision History        User Key Date/Time User Provider Type Action    > GO1.2 11/27/2018  3:13 PM Lamar Mcgowan RN Case Manager Addend     GO1.1 11/27/2018 11:50 AM Lamar Mcgowan RN Case Manager Sign            Progress Notes by Maury Foster MD at 11/27/2018  9:10 AM     Author:  Maury Foster MD Service:  Hospitalist Author Type:  Physician    Filed:  11/27/2018  9:17 AM Date of Service:  11/27/2018  9:10 AM Creation Time:  11/27/2018  9:10 AM    Status:  Signed :  Maury Foster MD (Physician)         [facility]    Hospitalist Progress Note  Date of Service (when I saw the patient): 11/27/2018    Assessment & Plan   Salo Rafael Willis is an 88-year-old male with medical  history significant for chronic A. fib, dementia, CAD and history of CABG, CHF and recurrent falls in the past , was brought to the ER for evaluation of fall.  He was found to have UTI and was admitted for further management on 11/25/18.      Urinary tract infection:   Patient did report some urinary symptoms including dysuria and frequency, UA was abnormal, had UTI in the past but did not see any positive culture, was started on Rocephin in the ER.  - Urine culture grew lisa, complete 3 doses.    Recurrent falls: patient has history of recurrent falls suspected due to generalized deconditioning, suspect at this time was due to worsened weakness from underlying UTI and also possibly volume overload.  No prodrome of dizziness or palpitation.  Denies chest pain, no loss of consciousness.  Unlikely seizure.  Denies any significant worsening of pain, though he had generalized pain, which is chronic. Treating UTI as above. PT eval.  Fall precaution.  He is not on any anticoagulation at this point.     Coronary artery disease with history of CABG and type 2 MI.   CHF with preserved EF  Patient was noted to have airway secretion and gurgling and he was not clearing the secretions. Also history of dysphagia. Chest x-ray appears clear. His legs are edematous, which they are always and at baseline right is more swollen than left, since vein was harvested from the Rt leg. Daughter-in-law reported on admission that swelling has worsened. He takes Lasix p.r.n.  His weight at discharge a month ago was 140 pounds. Weight here is 165 pounds now.  -Continue Lasix 20 mg IV daily, Not on O2 prior, needing upto 1-2 L. Pulse ox while ambulating, if stable, will change to PO in am and plan discharge.   -Monitor intake and output is able, weight, fluid restriction and  monitor BMP.    -Not following troponin, no further cardiac workup after reviewing with family.     Chronic atrial fibrillation:  Patient with an underlying rhythm of  atrial flutter, but rate controlled on admission.    -PTA Coreg 6.25 mg p.o. b.i.d. Continued.  - PTA aspirin, Lipitor and lisinopril continued.     Dementia.  PTA rivastigmine will be continued.     Hypernatremia, mild: Sodium 147-->145 today     DVT Prophylaxis: Pneumatic Compression Devices  Code Status: DNR/DNI    Disposition: Expected discharge: Possibly 1 more day of IV diuresis. Discussed with patient's daughter in law in the room. Reviewed with ANGELA.     Maury Foster MD  Hospitalist    Interval History    Patient was seen and evaluated, no upper airway gurgling noted, he feels breathing is better.  Desaturated to 87% on room air per nursing report.  On 2 L nasal cannula currently.  No acute issues otherwise.  Daughter-in-law at bedside.       -Data reviewed today: I reviewed all new labs and imaging results over the last 24 hours. I personally reviewed no images or EKG's today.    Physical Exam   Temp: 97.6  F (36.4  C) Temp src: Axillary BP: 130/45 Pulse: 66 Heart Rate: 85 Resp: 18 SpO2: 98 % O2 Device: Nasal cannula Oxygen Delivery: 3 LPM  Vitals:    11/26/18 0709   Weight: 75 kg (165 lb 5.5 oz)     Vital Signs with Ranges  Temp:  [95.8  F (35.4  C)-97.6  F (36.4  C)] 97.6  F (36.4  C)  Pulse:  [66-72] 66  Heart Rate:  [68-85] 85  Resp:  [14-18] 18  BP: (124-137)/(45-57) 130/45  SpO2:  [87 %-98 %] 98 %  I/O last 3 completed shifts:  In: 720 [P.O.:720]  Out: 60 [Urine:60]    Constitutional: Alert, awake. Awake.   HEENT: PERRLA EOMI, mucosa is pale.  Respiratory: Bilateral equal air entry, bibasilar crackles but no wheezing, no respiratory distress. Remains on room air  Cardiovascular: Regular s1s2, systolic murmur 2/6. No tachycardia.  GI: Soft, non distended, non tender, bowel tones active.  Skin/Integumen: No rash, no blister.  Pale.  Neuro/Psych:  Alert, awake, oriented to self, knows he is in hospital.  Calm and appropriate, follows verbal commands appropriately.  No cranial nerve  deficit.      Medications       aspirin  81 mg Oral Daily     carvedilol  3.125 mg Oral BID w/meals     cefTRIAXone  1 g Intravenous Q24H     furosemide  20 mg Intravenous Daily     lisinopril  2.5 mg Oral QPM     mirtazapine  7.5 mg Oral At Bedtime     ranitidine  150 mg Oral BID AC     rivastigmine  1.5 mg Oral BID w/meals     sucralfate  1 g Oral 4x Daily     traZODone  50 mg Oral At Bedtime       Data     Recent Labs  Lab 11/27/18  0658 11/26/18  0730 11/25/18  1045   WBC  --  7.3 8.0   HGB  --  10.1* 10.9*   MCV  --  103* 104*   PLT  --  153 172   INR  --   --  1.18*   * 147* 147*   POTASSIUM 3.8 4.0 4.3   CHLORIDE 106 108 108   CO2 35* 33* 37*   BUN 19 20 19   CR 0.74 0.75 0.80   ANIONGAP 4 6 2*   LUCIEN 8.1* 8.2* 8.1*   * 83 105*   ALBUMIN  --   --  2.3*   PROTTOTAL  --   --  6.1*   BILITOTAL  --   --  0.4   ALKPHOS  --   --  85   ALT  --   --  30   AST  --   --  26   TROPI  --   --  0.048*       No results found for this or any previous visit (from the past 24 hour(s)).[TK1.1]     Revision History        User Key Date/Time User Provider Type Action    > TK1.1 11/27/2018  9:17 AM Maury Foster MD Physician Sign            Progress Notes by Aline Browning RN at 11/27/2018  8:23 AM     Author:  Aline Browning RN Service:  (none) Author Type:  Registered Nurse    Filed:  11/27/2018  8:24 AM Date of Service:  11/27/2018  8:23 AM Creation Time:  11/27/2018  8:23 AM    Status:  Signed :  Aline Browning RN (Registered Nurse)         [facility]  Patient is currently open to home care services with [facility]. The patient is currently receiving Skilled Nursing/PT/OT services. Critical access hospital  and team have been notified of patient admission. Critical access hospital liaison will continue to follow patient during stay. If appropriate provide orders to resume home care at time of discharge.[SH1.1]         Revision History        User Key Date/Time User Provider Type Action    > SH1.1 11/27/2018   8:24 AM Nam, Aline FABIAN RN Registered Nurse Sign            Progress Notes by Nicole Calderon SLP at 11/26/2018  4:07 PM     Author:  Nicole Calderon SLP Service:  (none) Author Type:  Speech Language Pathologist    Filed:  11/26/2018  4:07 PM Date of Service:  11/26/2018  4:07 PM Creation Time:  11/26/2018  4:07 PM    Status:  Signed :  Nicole Calderon SLP (Speech Language Pathologist)            11/26/18 1500   General Information   Onset Date 11/25/18   Start of Care Date 11/26/18   Referring Physician Maury Fosetr MD   Patient/Family Goals Statement None stated   Swallowing Evaluation Bedside swallow evaluation   Behaviorial Observations WFL (within functional limits);Confused   Mode of current nutrition Oral diet   Type of oral diet Thin liquid;Regular   Respiratory Status Room air   Comments Salo Willis is an 88-year-old male with medical history significant for chronic A. fib, dementia, CAD and history of CABG, CHF and recurrent falls in the past , was brought to the ER for evaluation of fall.  He was found to have UTI and was admitted for further management on 11/25/18.  Pt was seen in October of this year with recommendations for dysphagia diet 2 and thin liquids and home health. He denies continuing softer foods and states he eats and drinks without any indications of dysphagia. No family present during evaluation    Clinical Swallow Evaluation   Oral Musculature generally intact   Structural Abnormalities none present   Dentition present and adequate   Mucosal Quality adequate   Oral Labial Strength and Mobility WFL   Lingual Strength and Mobility WFL   Laryngeal Function Cough;Swallow;Voicing initiated   Additional Documentation Yes   Swallow Eval   Feeding Assistance minimal assistance required   Clinical Swallow Eval: Thin Liquid Texture Trial   Mode of Presentation, Thin Liquids cup;spoon;self-fed   Volume of Liquid or Food Presented 4 oz    Oral Phase of Swallow WFL   Pharyngeal Phase  of Swallow intact   Diagnostic Statement No overt s/sx of aspiraiton    Clinical Swallow Eval: Puree Solid Texture Trial   Mode of Presentation, Puree spoon;fed by clinician   Volume of Puree Presented 4 oz    Oral Phase, Puree WFL   Pharyngeal Phase, Puree intact   Diagnostic Statement Adequate oral manipulation, no s/sx of aspiration    Clinical Swallow Eval: Solid Food Texture Trial   Mode of Presentation, Solid self-fed   Volume of Solid Food Presented 1 cracker    Oral Phase, Solid WFL  (Mastication is somewhat prolonged but functional )   Pharyngeal Phase, Solid intact   Diagnostic Statement Adequate manipulation. no s/sx of aspiration    Swallow Compensations   Swallow Compensations Alternate viscosity of consistencies;Pacing;Reduce amounts   Esophageal Phase of Swallow   Patient reports or presents with symptoms of esophageal dysphagia Yes   Esophageal comments Intermittent belching   General Therapy Interventions   Planned Therapy Interventions Dysphagia Treatment   Dysphagia treatment Instruction of safe swallow strategies   Swallow Eval: Clinical Impressions   Skilled Criteria for Therapy Intervention Skilled criteria met.  Treatment indicated.   Functional Assessment Scale (FAS) 6   Treatment Diagnosis Minimal oropharyngeal dysphagia    Diet texture recommendations Regular diet;Thin liquids   Recommended Feeding/Eating Techniques alternate between small bites and sips of food/liquid;check mouth frequently for oral residue/pocketing;small sips/bites;no straws   Therapy Frequency 5 times/wk   Predicted Duration of Therapy Intervention (days/wks) follow up x 1?   Anticipated Discharge Disposition extended care facility   Risks and Benefits of Treatment have been explained. Yes   Patient, family and/or staff in agreement with Plan of Care Yes   Clinical Impression Comments Pt seen for dysphagia evaluation. RN not aware of any difficulty - stated he ate, drank and took pills without incident. Pt had been  recommended dysphagia diet 2 and thin liquids by our SLP department in 10/2018, but he denies being on a modified diet at his place of residence. Pt was assessed with thin liquids, puree, and regular solids. Oral phase is functional in bolus acceptance. Slightly prolonged mastication, bolus formation and oral clearance, but functional given time and cues to take sips and refrain from talking while eating. No overt s/sx of aspiration, changes in breath sounds or vocal quality. Pt denies any pain or globus sensation. Recommend regular solids and thin liquids - pt should e sitting upright, take small bites/sips, alternate solids and liquids, refrain from talking while eating and stay sitting upright for 30 minutes following oral intake.    Total Evaluation Time   Total Evaluation Time (Minutes) 25[LH1.1]        Revision History        User Key Date/Time User Provider Type Action    > LH1.1 11/26/2018  4:07 PM Nicole Calderon, SLP Speech Language Pathologist Sign            Progress Notes by Maury Foster MD at 11/26/2018  8:25 AM     Author:  Maury Foster MD Service:  Hospitalist Author Type:  Physician    Filed:  11/26/2018  9:21 AM Date of Service:  11/26/2018  8:25 AM Creation Time:  11/26/2018  8:25 AM    Status:  Signed :  Maury Foster MD (Physician)         [facility]    Hospitalist Progress Note  Date of Service (when I saw the patient): 11/26/2018    Assessment & Plan   Salo Willis is an 88-year-old male[TK1.1] with medical history significant for chronic A. fib, dementia, CAD and history of CABG, CHF and recurrent falls in the past ,[TK1.2] was brought to the ER for evaluation of fall.[TK1.1]  He was found to have UTI and was admitted for further management on 11/25/18.[TK1.2]      Urinary tract infection[TK1.1]:   Patient did report[TK1.2] some[TK1.1] urinary[TK1.2] symptoms including dysuria and frequency, UA was abnormal, had UTI in the past but did not see any  positive culture, was started on Rocephin in the ER, will continue.  Follow urine culture.[TK1.1]  Remains afebrile.  Monitor for any urinary retention or gross hematuria.[TK1.2]    Recurrent falls[TK1.1]: p[TK1.2]celine has history of recurrent falls suspected due to[TK1.1] generalized deconditioning, suspect at this time was due to worsened weakness from[TK1.2] underlying UTI[TK1.1] and also possibly volume overload[TK1.2].  No prodrome of dizziness or palpitation.  Denies chest pain[TK1.1], no[TK1.2] loss of consciousness.  Unlikely seizure.  Denies any significant worsening of pain, though he had generalized pain, which is chronic. Treat UTI as above.[TK1.1]  Completed[TK1.2] PT eval.  Fall[TK1.1] precaution[TK1.2].  He is not on any anticoagulation at this point.     Coronary artery disease with history of CABG and type 2 MI.[TK1.1]   CHF with preserved EF  P[TK1.2]celine[TK1.1] was noted to have[TK1.2] airway[TK1.1] secretion and[TK1.2] gurgling[TK1.1] and he was not clearing the secretions. A[TK1.2]lso[TK1.1] history of[TK1.2] dysphagia. Chest x-ray appears clear. His legs are edematous, which they are always[TK1.1] and at baseline right is more swollen than left, since vein was harvested from the Rt leg. D[TK1.2]aughter-in-law[TK1.1] reported on admission that swelling has[TK1.2] worsened. He takes Lasix p.r.n.  His weight at discharge a month ago was 140 pounds.[TK1.1]    -Weight here is 165 pounds now.  -Continue Lasix 20 mg IV daily  -Monitor intake and output is able, weight, fluid restriction and  monitor BMP.    -Monitor respiratory status, he is on room air.  -Not following troponin, no further cardiac workup after reviewing with family.[TK1.2]     Chronic atrial fibrillation[TK1.1]:[TK1.2]  Patient[TK1.1] with[TK1.2] an underlying rhythm of atrial flutter, but rate controlled[TK1.1] on admission[TK1.2].[TK1.1]    -PTA[TK1.2] Coreg 6.25 mg p.o. b.i.d.[TK1.1] Continued.  - PTA[TK1.2] aspirin, Lipitor  "and lisinopril[TK1.1] continued.[TK1.2]     Dementia.  PTA rivastigmine will be continued.[TK1.1]     Hypernatremia, mild: Sodium 147, monitor.[TK1.2]    DVT Prophylaxis:[TK1.1] Pneumatic Compression Devices[TK1.2]  Code Status: DNR/DNI    Disposition: Expected discharge[TK1.1]: 1-2 days pending urine culture, further diuresis.  Discussed with patient, will follow up with family when available.  Faustino with nursing staff.[TK1.2]    Maury Foster MD  Hospitalist    Interval History[TK1.1]   No acute events overnight, he just worked with PT this morning and is up in the chair.  Feels dyspneic, remains on room air.  Complains his back is sore and \"not so good\".  Pain controlled with Tylenol.  Remains afebrile.  Otherwise denies chest pain, nausea, abdominal pain, diarrhea.[TK1.2]    -Data reviewed today: I reviewed all new labs and imaging results over the last 24 hours. I personally reviewed[TK1.1] no images or EKG's today[TK1.2].    Physical Exam   Temp: 95.2  F (35.1  C) Temp src: Oral BP: 139/48 Pulse: 88 Heart Rate: 64 Resp: 14 SpO2: 97 % O2 Device: Nasal cannula Oxygen Delivery: 1 LPM  Vitals:    11/26/18 0709   Weight: 75 kg (165 lb 5.5 oz)     Vital Signs with Ranges  Temp:  [95.2  F (35.1  C)-98.3  F (36.8  C)] 95.2  F (35.1  C)  Pulse:  [68-88] 88  Heart Rate:  [64-75] 64  Resp:  [14-18] 14  BP: (114-143)/(42-65) 139/48  SpO2:  [89 %-97 %] 97 %  I/O last 3 completed shifts:  In: 240 [P.O.:240]  Out: -     Constitutional: Alert, awake.[TK1.1]  Up in chair, n[TK1.2]ot in distress.   HEENT: PERRLA EOMI[TK1.1], mucosa is pale.[TK1.2]  Respiratory: Bilateral equal air entry,[TK1.1] by basilar crackles, no wheezing, n[TK1.2]o respiratory distress.[TK1.1]  On room air[TK1.2]  Cardiovascular: Regular s1s2,[TK1.1] systolic murmur 2/6[TK1.2]. No tachycardia.  GI: Soft, non distended, non tender, bowel tones active.  Skin/Integumen: No rash, no blister.[TK1.1]  Pale.[TK1.2]  Neuro/Psych:[TK1.1]    Alert, awake, " oriented to self, knows he is in hospital.  Calm and appropriate, follows verbal commands appropriately.  No cranial nerve deficit.[TK1.2]      Medications       aspirin  81 mg Oral Daily     carvedilol  3.125 mg Oral BID w/meals     cefTRIAXone  1 g Intravenous Q24H     furosemide  20 mg Intravenous Daily     lisinopril  2.5 mg Oral QPM     mirtazapine  7.5 mg Oral At Bedtime     ranitidine  150 mg Oral BID AC     rivastigmine  1.5 mg Oral BID w/meals     sucralfate  1 g Oral 4x Daily     traZODone  50 mg Oral At Bedtime       Data     Recent Labs  Lab 11/26/18  0730 11/25/18  1045   WBC 7.3 8.0   HGB 10.1* 10.9*   * 104*    172   INR  --  1.18*   * 147*   POTASSIUM 4.0 4.3   CHLORIDE 108 108   CO2 33* 37*   BUN 20 19   CR 0.75 0.80   ANIONGAP 6 2*   LUCIEN 8.2* 8.1*   GLC 83 105*   ALBUMIN  --  2.3*   PROTTOTAL  --  6.1*   BILITOTAL  --  0.4   ALKPHOS  --  85   ALT  --  30   AST  --  26   TROPI  --  0.048*       Recent Results (from the past 24 hour(s))   Lumbar spine XR, 2-3 views    Narrative    LUMBAR SPINE TWO TO THREE VIEWS  11/25/2018 11:51 AM     COMPARISON: None    HISTORY: Pain post fall.       Impression    IMPRESSION: There is normal alignment of the lumbar vertebrae.  Vertebral body heights of the lumbar spine are normal. Lumbar  intervertebral disc space heights are within normal limits. There is  no evidence for fracture of the lumbar spine. There is degenerative  facet arthropathy at the L4-L5 and L5-S1 levels.    LUIS A ANTHONY MD   XR Chest 1 View    Narrative    CHEST ONE VIEW 11/25/2018 11:52 AM     COMPARISON: Frontal chest x-ray 10/12/2018    HISTORY: Fall       Impression    IMPRESSION: Median sternotomy changes again noted. A dual-lead  pacemaker module implanted over the left chest with the leads in the  right atrium and right ventricle is again noted without identifiable  change.    Moderate cardiomegaly again noted. There are no airspace opacities to  suggest pneumonia.  There is no pleural effusion or pneumothorax.    LUIS A ANTHONY MD   XR Pelvis w Hip Right 1 View    Narrative    PELVIS AND HIP RIGHT ONE VIEW 11/25/2018 11:53 AM     COMPARISON: None    HISTORY: Pain post fall.     FINDINGS: The visualized bones and joint spaces are within normal  limits.      Impression    IMPRESSION: No evidence for fracture, dislocation or significant  degenerative change of the pelvis or right hip.    LUIS A ANTHONY MD[TK1.1]        Revision History        User Key Date/Time User Provider Type Action    > TK1.2 11/26/2018  9:21 AM Maury Foster MD Physician Sign     TK1.1 11/26/2018  8:25 AM Maury Foster MD Physician                   Procedure Notes     No notes of this type exist for this encounter.         Progress Notes - Therapies (Notes from 11/26/18 through 11/29/18)      Progress Notes by Leslie White PT at 11/27/2018  3:59 PM     Author:  Leslie White PT Service:  Acute IP Rehab Author Type:  Physical Therapist    Filed:  11/27/2018  3:59 PM Date of Service:  11/27/2018  3:59 PM Creation Time:  11/27/2018  3:59 PM    Status:  Signed :  Leslie White PT (Physical Therapist)          11/27/18 1505   Quick Adds   Type of Visit Initial PT Evaluation   Living Environment   Lives With facility resident   Living Arrangements assisted living  (memory care)   Home Accessibility no concerns   Number of Stairs to Enter Home 0   Number of Stairs Within Home 0   Living Environment Comment lives at [facility] at baseline   Self-Care   Usual Activity Tolerance moderate   Current Activity Tolerance poor   Equipment Currently Used at Home walker, rolling   Activity/Exercise/Self-Care Comment patient needing assist for mobility but at times getting up with walker without assist and falling   Functional Level Prior   Ambulation 1-->assistive equipment  (walker)   Transferring 3-->assistive equipment and person   Toileting 3-->assistive equipment and person   Bathing  "2-->assistive person   Dressing 2-->assistive person   Cognition 1 - attention or memory deficits  (dementia)   Fall history within last six months yes   Number of times patient has fallen within last six months 6  (per chart; patient unable to state other than \"a lot\")   Which of the above functional risks had a recent onset or change? fall history   Prior Functional Level Comment patient was supposed to have assist with mobility but has fallen multiple times when getting up without assist   General Information   Onset of Illness/Injury or Date of Surgery - Date 11/25/18   Referring Physician Maury Foster MD   Patient/Family Goals Statement not stated   Pertinent History of Current Problem (include personal factors and/or comorbidities that impact the POC) per chart: Salo Willis is an 88-year-old male with medical history significant for chronic A. fib, dementia, CAD and history of CABG, CHF and recurrent falls in the past , was brought to the ER for evaluation of fall.  He was found to have UTI and was admitted for further management on 11/25/18; see medical record for further information   Precautions/Limitations fall precautions;oxygen therapy device and L/min  (2L)   General Observations patient sleeping upon therapist arrival; agreeable to PT eval upon waking   General Info Comments Activity: up with assist   Cognitive Status Examination   Orientation orientation to person, place and time   Level of Consciousness alert   Follows Commands and Answers Questions 75% of the time   Personal Safety and Judgment impaired;at risk behaviors demonstrated   Memory impaired   Cognitive Comment baseline dementia   Pain Assessment   Patient Currently in Pain Yes, see Vital Sign flowsheet  (R sided rib pain with transition movements)   Posture    Posture Comments forward flexed in standing   Range of Motion (ROM)   ROM Comment UE's limited to about 90 degrees; Guarding of R arm movement secondary to R sided rib " "pain; some difficulty actively moving LE's to command   Strength   Strength Comments functional weakness from recent illness; needing significant assist for mobility; further limited by pain   Bed Mobility   Bed Mobility Comments max A for supine to sit; dependent assist to return to supine   Transfer Skills   Transfer Comments sit>stand with bed elevated and mod A; max A x 2 from standard height bed and chair height surface   Gait   Gait Comments Able to ambulate in room x 10 feet with a rolling walker and min A; SBA of another for safety   Balance   Balance Comments baseline impairment; improved with walker use; multiple recent falls   General Therapy Interventions   Planned Therapy Interventions bed mobility training;gait training;ROM;strengthening;transfer training;progressive activity/exercise   Clinical Impression   Criteria for Skilled Therapeutic Intervention yes, treatment indicated   PT Diagnosis impaired gait/transfers; weakness   Influenced by the following impairments weakness; R sided rib pain; impaired cognition; decreased activity tolerance; impaired balance   Functional limitations due to impairments impaired independence with functional mobility   Clinical Presentation Stable/Uncomplicated   Clinical Presentation Rationale max A of 1-2 for mobility; current environment not able to provide cares needed; medically stabilizing   Clinical Decision Making (Complexity) Low complexity   Therapy Frequency` 5 times/week   Predicted Duration of Therapy Intervention (days/wks) 3-5 days   Anticipated Equipment Needs at Discharge front wheeled walker;wheelchair   Anticipated Discharge Disposition Transitional Care Facility   Risk & Benefits of therapy have been explained Yes   Patient, Family & other staff in agreement with plan of care Yes   [facility] AM-PAC TM \"6 Clicks\"   2016, Trustees of [facility], under license to [employer].  All rights reserved.   6 Clicks Short Forms Basic Mobility " "Inpatient Short Form   [facility] AM-PAC  \"6 Clicks\" V.2 Basic Mobility Inpatient Short Form   1. Turning from your back to your side while in a flat bed without using bedrails? 2 - A Lot   2. Moving from lying on your back to sitting on the side of a flat bed without using bedrails? 2 - A Lot   3. Moving to and from a bed to a chair (including a wheelchair)? 2 - A Lot   4. Standing up from a chair using your arms (e.g., wheelchair, or bedside chair)? 2 - A Lot   5. To walk in hospital room? 3 - A Little   6. Climbing 3-5 steps with a railing? 1 - Total   Basic Mobility Raw Score (Score out of 24.Lower scores equate to lower levels of function) 12   Total Evaluation Time   Total Evaluation Time (Minutes) 10[SL1.1]        Revision History        User Key Date/Time User Provider Type Action    > SL1.1 11/27/2018  3:59 PM Leslie White, PT Physical Therapist Sign            "

## 2018-11-25 NOTE — ED NOTES
[facility]  ED Nurse Handoff Report    ED Chief complaint: Fall (Arrives via EMS from [facility] care facility in [place]. Hx of multiple falls, more ffrequently lately. Fell at some point last night and crawled back to bed. Staff did not know how long down, patient states maybe a couple hours but unreliable historian. complains of some backpain, denies head pain, no obvious traums. Incontinent of urine on arrival)      ED Diagnosis:   Final diagnoses:   Urinary tract infection without hematuria, site unspecified   Ischemic cardiomyopathy       Code Status: DNR / DNI    Allergies:   Allergies   Allergen Reactions     Niacin        Activity level - Baseline/Home:  Stand with Assist of 2    Activity Level - Current:   Stand with Assist of 2     Needed?: No    Isolation: No  Infection: Not Applicable  Bariatric?: No    Vital Signs:   Vitals:    11/25/18 1045 11/25/18 1100 11/25/18 1432 11/25/18 1433   BP:  133/64 140/63    Pulse:       Resp:       Temp:       TempSrc:       SpO2: (!) 89% 94%  92%   Height:           Cardiac Rhythm: ,        Pain level: 0-10 Pain Scale: 4    Is this patient confused?: Yes   [place] - Suicide Severity Rating Scale Completed?  Yes  If yes, what color did the patient score?  White    Patient Report: Initial Complaint: Salo Willis is a 88 year old male with a history of AAA s/p repair, atrial fibrillation, CAD, defibrillator implanted, hyperlipidemia and tobacco use who presents via EMS to the emergency department for evaluation of after a fall. Per EMS, the patient arrives here form a memory care facility in [place]. He has a history of multiple recent falls and last night he attempted to go to the bathroom without his walker. He fell at some point and was found on the bathroom floor with low back pain. Staff are uncertain of the amount of time he was on the floor. He had no obvious injuries or head pain  Focused Assessment: A/O x2, SOB with activity,  lungs sound wet, mild to moderate lower extremity edema,   Tests Performed: labs, ekg, xrays chestl L-spine and pelvis/hips.  Abnormal Results:   Results for orders placed or performed during the hospital encounter of 11/25/18 (from the past 24 hour(s))   EKG 12-lead, tracing only   Result Value Ref Range    Interpretation ECG Click View Image link to view waveform and result    CBC with platelets differential   Result Value Ref Range    WBC 8.0 4.0 - 11.0 10e9/L    RBC Count 3.36 (L) 4.4 - 5.9 10e12/L    Hemoglobin 10.9 (L) 13.3 - 17.7 g/dL    Hematocrit 34.8 (L) 40.0 - 53.0 %     (H) 78 - 100 fl    MCH 32.4 26.5 - 33.0 pg    MCHC 31.3 (L) 31.5 - 36.5 g/dL    RDW 15.2 (H) 10.0 - 15.0 %    Platelet Count 172 150 - 450 10e9/L    Diff Method Automated Method     % Neutrophils 67.7 %    % Lymphocytes 18.7 %    % Monocytes 8.4 %    % Eosinophils 4.5 %    % Basophils 0.3 %    % Immature Granulocytes 0.4 %    Nucleated RBCs 0 0 /100    Absolute Neutrophil 5.4 1.6 - 8.3 10e9/L    Absolute Lymphocytes 1.5 0.8 - 5.3 10e9/L    Absolute Monocytes 0.7 0.0 - 1.3 10e9/L    Absolute Eosinophils 0.4 0.0 - 0.7 10e9/L    Absolute Basophils 0.0 0.0 - 0.2 10e9/L    Abs Immature Granulocytes 0.0 0 - 0.4 10e9/L    Absolute Nucleated RBC 0.0    INR   Result Value Ref Range    INR 1.18 (H) 0.86 - 1.14   Comprehensive metabolic panel   Result Value Ref Range    Sodium 147 (H) 133 - 144 mmol/L    Potassium 4.3 3.4 - 5.3 mmol/L    Chloride 108 94 - 109 mmol/L    Carbon Dioxide 37 (H) 20 - 32 mmol/L    Anion Gap 2 (L) 3 - 14 mmol/L    Glucose 105 (H) 70 - 99 mg/dL    Urea Nitrogen 19 7 - 30 mg/dL    Creatinine 0.80 0.66 - 1.25 mg/dL    GFR Estimate >90 >60 mL/min/1.7m2    GFR Estimate If Black >90 >60 mL/min/1.7m2    Calcium 8.1 (L) 8.5 - 10.1 mg/dL    Bilirubin Total 0.4 0.2 - 1.3 mg/dL    Albumin 2.3 (L) 3.4 - 5.0 g/dL    Protein Total 6.1 (L) 6.8 - 8.8 g/dL    Alkaline Phosphatase 85 40 - 150 U/L    ALT 30 0 - 70 U/L    AST 26 0 - 45  U/L   Troponin I   Result Value Ref Range    Troponin I ES 0.048 (H) 0.000 - 0.045 ug/L   CK total   Result Value Ref Range    CK Total 47 30 - 300 U/L   UA with Microscopic reflex to Culture   Result Value Ref Range    Color Urine Light Yellow     Appearance Urine Slightly Cloudy     Glucose Urine Negative NEG^Negative mg/dL    Bilirubin Urine Negative NEG^Negative    Ketones Urine Negative NEG^Negative mg/dL    Specific Gravity Urine 1.011 1.003 - 1.035    Blood Urine Moderate (A) NEG^Negative    pH Urine 5.5 5.0 - 7.0 pH    Protein Albumin Urine 10 (A) NEG^Negative mg/dL    Urobilinogen mg/dL Normal 0.0 - 2.0 mg/dL    Nitrite Urine Negative NEG^Negative    Leukocyte Esterase Urine Large (A) NEG^Negative    Source Midstream Urine     WBC Urine >182 (H) 0 - 5 /HPF    RBC Urine 46 (H) 0 - 2 /HPF    WBC Clumps Present (A) NEG^Negative /HPF   Lumbar spine XR, 2-3 views    Narrative    LUMBAR SPINE TWO TO THREE VIEWS  11/25/2018 11:51 AM     COMPARISON: None    HISTORY: Pain post fall.       Impression    IMPRESSION: There is normal alignment of the lumbar vertebrae.  Vertebral body heights of the lumbar spine are normal. Lumbar  intervertebral disc space heights are within normal limits. There is  no evidence for fracture of the lumbar spine. There is degenerative  facet arthropathy at the L4-L5 and L5-S1 levels.    LUIS A ANTHONY MD   XR Chest 1 View    Narrative    CHEST ONE VIEW 11/25/2018 11:52 AM     COMPARISON: Frontal chest x-ray 10/12/2018    HISTORY: Fall       Impression    IMPRESSION: Median sternotomy changes again noted. A dual-lead  pacemaker module implanted over the left chest with the leads in the  right atrium and right ventricle is again noted without identifiable  change.    Moderate cardiomegaly again noted. There are no airspace opacities to  suggest pneumonia. There is no pleural effusion or pneumothorax.    LUIS A ANTHONY MD   XR Pelvis w Hip Right 1 View    Narrative    PELVIS AND HIP RIGHT  ONE VIEW 11/25/2018 11:53 AM     COMPARISON: None    HISTORY: Pain post fall.     FINDINGS: The visualized bones and joint spaces are within normal  limits.      Impression    IMPRESSION: No evidence for fracture, dislocation or significant  degenerative change of the pelvis or right hip.    LUIS A ANTHONY MD     Treatments provided: lasix and rocephin    Family Comments: daught-in-law at bedside    OBS brochure/video discussed/provided to patient/family: No              Name of person given brochure if not patient:               Relationship to patient:     ED Medications:   Medications   acetaminophen (TYLENOL) tablet 650 mg (650 mg Oral Given 11/25/18 1042)   furosemide (LASIX) injection 20 mg (20 mg Intravenous Given 11/25/18 1039)   cefTRIAXone (ROCEPHIN) 1 g vial to attach to  mL bag for ADULTS or NS 50 mL bag for PEDS (0 g Intravenous Stopped 11/25/18 1338)       Drips infusing?:  Yes    For the majority of the shift this patient was Green.   Interventions performed were na.    Severe Sepsis OR Septic Shock Diagnosis Present: No    To be done/followed up on inpatient unit:      ED NURSE PHONE NUMBER: *[phone]

## 2018-11-25 NOTE — PROGRESS NOTES
RECEIVING UNIT ED HANDOFF REVIEW    ED Nurse Handoff Report was reviewed by: Belén Meng on November 25, 2018 at 4:01 PM

## 2018-11-25 NOTE — ED PROVIDER NOTES
History     Chief Complaint:  Fall     HPI:   The history is provided by the patient and the EMS personnel.      Salo Willis is a 88 year old male with a history of AAA s/p repair, atrial fibrillation, CAD, defibrillator implanted, hyperlipidemia and tobacco use who presents via EMS to the emergency department for evaluation of after a fall. Per EMS, the patient arrives here form a [facility] care facility in [place]. He has a history of multiple recent falls and last night he attempted to go to the bathroom without his walker. He fell at some point and was found on the bathroom floor with low back pain. Staff are uncertain of the amount of time he was on the floor. He had no obvious injuries or head pain.     Allergies:  Niacin      Medications:    Artificial saliva   Aspirin 81 mg   Atorvastatin   Coreg   Lasix 20 mg   Lisinopril 2.5 mg  Oxaydo   Miralax   Potassium chloride SA  Zantac   Carafate   Trazodone     Past Medical History:    Abdominal aortic aneurysm   Automatic cardioverter/defibrillator   Atrial fibrillation   CAD  Cardiomyopathy   GERD  Inguinal hernia   Hyperlipidemia   Paroxysmal VT  Prostate cancer     Past Surgical History:    AAA repair   Carotid endarterectomy right   Heart cath left   AICD dual chamber   Coronary artery bypass   TURP    Family History:    Unknown due to adoption     Social History:  Presents via EMS    Tobacco use: Former smoker   Alcohol use: NO   PCP: [facility]    Marital Status:        Review of Systems   Musculoskeletal: Positive for back pain.   Neurological: Negative for headaches.   All other systems reviewed and are negative.      Physical Exam     Patient Vitals for the past 24 hrs:   BP Temp Temp src Pulse Heart Rate Resp SpO2 Height   11/25/18 1433 - - - - - - 92 % -   11/25/18 1432 140/63 - - - - - - -   11/25/18 1100 133/64 - - - - - 94 % -   11/25/18 1045 - - - - - - (!) 89 % -   11/25/18 1042 - - - - - - 95 % -   11/25/18 1041  "134/56 - - - - - - -   11/25/18 1030 - - - - - - 93 % -   11/25/18 1015 - - - - - - 94 % -   11/25/18 1000 - - - - - - 92 % -   11/25/18 0945 - - - - - - 92 % -   11/25/18 0934 143/65 97.7  F (36.5  C) Oral 68 68 16 94 % 1.676 m (5' 6\")        Physical Exam  Constitutional: Elderly white male supine.   HENT: No signs of trauma.   Eyes: EOM are normal. Pupils are equal, round, and reactive to light.   Neck: Normal range of motion. No JVD present. No cervical adenopathy. No posterior midline tenderness.   Cardiovascular: regular.  Exam reveals no gallop and no friction rub.    No murmur heard.  Pulmonary/Chest: Bilateral breath sounds normal. No wheezes, rhonchi or rales. Defibrillator left chest.   Abdominal: Soft. No rebound or guarding. Multiple surgical incisions. Mild tenderness diffusely. 1 plus femoral pulses bilaterally.   Musculoskeletal: No tenderness. 1 plus lower extremity edema.   Lymphadenopathy: No lymphadenopathy.   Neurological: Alert and oriented to person, place, and time. GCS 15. Fluent speech. No facial asymmetry. 3/5 strength lower extremities, 4/5 strength upper extremities.   Skin: Skin is warm and dry. No rash noted. No erythema.      Emergency Department Course   ECG (10:23:16):  Rate 67 bpm. [place] interval 160. QRS duration 160. QT/QTc 434/458. P-R-T axes * -31 -47. Normal sinus rhythm. Left axis deviation. RBBB. Inferior infarct, age undetermined. Abnormal ECG. Interpreted by Sathya Trinh MD.     Imaging:  Radiographic findings were communicated with the patient who voiced understanding of the findings.     XR Pelvis with hip, Right, 1 view:  IMPRESSION: No evidence for fracture, dislocation or significant  degenerative change of the pelvis or right hip.    XR Lumbar spine, 2-3 views:  IMPRESSION: There is normal alignment of the lumbar vertebrae.  Vertebral body heights of the lumbar spine are normal. Lumbar  intervertebral disc space heights are within normal limits. There is  no " evidence for fracture of the lumbar spine. There is degenerative  facet arthropathy at the L4-L5 and L5-S1 levels.    XR Chest, 1 views:  IMPRESSION: Median sternotomy changes again noted. A dual-lead  pacemaker module implanted over the left chest with the leads in the  right atrium and right ventricle is again noted without identifiable  change.    Imaging independently reviewed and agree with radiologist interpretation.        Laboratory:  I personally reviewed the laboratory results with the Patient and answered all related questions prior to admission.  CK total: 47  CBC: WBC 8.0, HGB 10.9,    INR: 1.18  CMP: Creatinine 0.80, Sodium 147, CO2 37, Anion gap 2, Glucose 105 Calcium 8.1, Albumin 2.3,  Protein total 6.1.   1445: Troponin: 0.048  UA with Microscopic: Blood moderate, Protein albumin 10, Leukocyte esterase large, RBC 46, WBC >182, WBC clumps present, ow Negative    Urine culture aerobic bacteria: Pending     Interventions:  1039: Lasix 20 mg IV   1042: Tylenol 650 mg PO   1323: Rocephin 1 g IV    Emergency Department Course:  Past medical records, nursing notes, and vitals reviewed.  1005: I performed an exam of the patient and obtained history, as documented above.     IV inserted and blood drawn. This was sent to the lab for further testing, results above.   Above interventions provided.      The patient was sent for a XR x 3 while in the emergency department, findings above.        1313: I rechecked the patient. Explained findings to patient.     1327: Patient had a very unsteady gait when road tested, and required two people to help him.     Findings and plan explained to the Patient who consents to admission.     1448: Discussed the patient with Dr. Foster, who will admit the patient to a medical bed for further monitoring, evaluation, and treatment.       Impression & Plan      Medical Decision Making:  Mr. Willis is an 88 year old male who presents to the emergency department after multiple  falls at his assisted living. This last time he was found down on the floor in the bathroom where he had tried to get there on his own without assistance. He does use a walker but still has problems walking. He was on the ground maybe two hours. He denies hurting his head or his neck. He has some mild chest discomfort on the right side with palpation along with some discomfort at the base of his spine. He does have edema and neck vein distension present and a history of cardiomyopathy. Work up includes chest x-ray, pelvis x-ray, and L-spine which show no acute abnormalities. Labs reveal an elevated troponin although significantly less than it was on his last visit just a few weeks ago. EKG shows no acute ischemic changes. Patient has not been getting Lasix at his nursing facility and this may be making him weaker, but he appears to be in some heart failure now. I have given him 20 of IV Lasix. Also his urine is packed with white cells which may be contributing to his falls, he received 1 gram of Rocephin. We did try ambulating the patient and he was very unstable and it required two people. Patient has been discussed with hospitalist and we will admit for further evaluation and treatment.     Diagnosis:    ICD-10-CM    1. Urinary tract infection without hematuria, site unspecified N39.0 Urine Culture Aerobic Bacterial   2. Ischemic cardiomyopathy with pedal edema I25.5    3. Multiple falls R29.6        Disposition:  Admitted to Dr. Foster for further evaluation and care.      Scribe Disclosure:   Shaq MAZA, am serving as a scribe at 10:15 PM on 11/25/2018 to document services personally performed by Sathya Trinh MD based on my observations and the provider's statements to me.       Sathya Trinh MD  11/25/2018    EMERGENCY DEPARTMENT       Sathya Trinh MD  11/25/18 8732

## 2018-11-25 NOTE — PHARMACY-ADMISSION MEDICATION HISTORY
Admission medication history interview status for the 11/25/2018  admission is complete. See EPIC admission navigator for prior to admission medications     Medication history source reliability:Good, MAR from [facility]    Actions taken by pharmacist (provider contacted, etc):removed biotine and Potassium. Changed zofran.  Added apple     Additional medication history information not noted on PTA med list : Called [facility] to refax MAR as initially was partially cut off    Medication reconciliation/reorder completed by provider prior to medication history? partially    Time spent in this activity: 20 minutes    Prior to Admission medications    Medication Sig Last Dose Taking? Auth Provider   acetaminophen (TYLENOL) 500 MG tablet Take 1,000 mg by mouth 3 times daily as needed for pain prn Yes Unknown, Entered By History   aspirin 81 MG EC tablet Take 1 tablet (81 mg) by mouth daily 11/25/2018 at 0800 Yes Huber Penaloza MD   atorvastatin (LIPITOR) 10 MG tablet Take 10 mg by mouth daily 11/25/2018 at 0800 Yes Reported, Patient   carvedilol (COREG) 6.25 MG tablet Take 3.125 mg by mouth 2 times daily 6.25 mg x 0.5 tabs= 3.125mg 11/25/2018 at 0800 Yes Unknown, Entered By History   cholecalciferol (VITAMIN  -D) 1000 units capsule Take 2 capsules by mouth daily 11/25/2018 at 0800 Yes Reported, Patient   docusate sodium (STOOL SOFTENER) 100 MG tablet Take 100 mg by mouth every evening  11/24/2018 at 1700 Yes Reported, Patient   furosemide (LASIX) 20 MG tablet Take 1 tablet (20 mg) by mouth daily as needed (Administer if daily weight increased greater than 3 pounds and/or 5 pounds in a week.) prn Yes Narendra Polanco PA-C   lisinopril (PRINIVIL/ZESTRIL) 2.5 MG tablet Take 1 tablet (2.5 mg) by mouth daily 11/24/2018 at 1700 Yes Huber Penaloza MD   mirtazapine (REMERON) 7.5 mg TABS Take 7.5 mg by mouth At Bedtime  11/24/2018 at 2100 Yes Reported, Patient   ondansetron (ZOFRAN-ODT) 8 MG ODT tab Take 8  mg by mouth every 8 hours as needed for nausea prn Yes Unknown, Entered By History   oxyCODONE HCl (OXAYDO) 5 MG TABA Take 2.5 mg by mouth every 4 hours as needed (pain) 5mg tab x 0.5 tab = 2.5mg prn Yes Unknown, Entered By History   polyethylene glycol (MIRALAX/GLYCOLAX) Packet Take 17 g by mouth daily prn Yes Narendra Polanco PA-C   ranitidine (ZANTAC) 150 MG tablet Take 1 tablet (150 mg) by mouth 2 times daily (before meals) 11/25/2018 at 0800 Yes Narendra Polanco PA-C   rivastigmine (EXELON) 1.5 MG capsule Take 1.5 mg by mouth 2 times daily (with meals)  11/25/2018 at 0800 Yes Reported, Patient   Skin Protectants, Misc. (BALBIR PROTECT EX) Apply topically 2 times daily as needed (pressure sores) prn Yes Unknown, Entered By History   sucralfate (CARAFATE) 1 GM tablet Take 1 tablet (1 g) by mouth 4 times daily 11/25/2018 at 1200 Yes Narendra Polanco PA-C   traZODone (DESYREL) 50 MG tablet Take 50 mg by mouth At Bedtime 11/24/2018 at 2100 Yes Reported, Patient

## 2018-11-25 NOTE — IP AVS SNAPSHOT
` ` Patient Information     Patient Name Sex     Salo Willis ([id number]) Male 1930       Room Bed    8803 8803-02      Patient Demographics     Address Phone    [address]  [place] [address] [phone] (Home) *Preferred*  [phone] (Mobile)      Patient Ethnicity & Race     Ethnic Group Patient Race    American White      Emergency Contact(s)     Name Relation Home Work Mobile    Geno Willis Daughter [phone]    Kenny Willis Son [phone]      Documents on File        Status Date Received Description       Documents for the Patient    Consent for EHR Access Received 08/10/18     Insurance Card Received 08/10/18 MEDICARE New     Patient ID Received 08/10/18 [place] ID     [facility] Specified Other       Privacy Notice - [place] Received 08/10/18     Consent for Services - Hospital and Clinic Received 08/10/18     HIE Auth Received 08/10/18     Insurance Card Received 08/10/18 [employer] Supp     Consent to Communicate Received 08/10/18        Documents for the Encounter    EMS/Ambulance Record  18 [facility]    CMS IM for Patient Signature Received 18     CMS IM for Patient Signature Received 18 2MM      Admission Information     Attending Provider Admitting Provider Admission Type Admission Date/Time    Maury Foster MD Kharel, Tirtha R, MD Emergency 18  0919    Discharge Date Hospital Service Auth/Cert Status Service Area     [facility]    Unit Room/Bed Admission Status       [address] 8803/8803-02 Admission (Confirmed)       Admission     Complaint    UTI (urinary tract infection)      Hospital Account     Name Acct ID Class Status Primary Coverage    Salo Willis [id number] Inpatient Open MEDICARE - MEDICARE            Guarantor Account (for Hospital Account #[id number])     Name Relation to Pt Service Area Active? Acct Type    Lazaro  Salo Hall Self FCS Yes Personal/Family    Address Phone          [address] [place] [address] [phone](H)              Coverage Information (for Hospital Account #[id number])     1. MEDICARE/MEDICARE     F/O Payor/Plan Precert #    MEDICARE/MEDICARE     Subscriber Subscriber #    Salo Willis [id number]    Address Phone    ATTN CLAIMS  [address]  [facility] [place] [address] [phone]          2. HUMANA/HUMANA MEDICARE SUPPLEMENT     F/O Payor/Plan Precert #    HUMANA/HUMANA MEDICARE SUPPLEMENT     Subscriber Subscriber #    Salo Willis [id number]    Address Phone    [address] [address]  [place] [address] [phone]

## 2018-11-25 NOTE — IP AVS SNAPSHOT
"` `           [place] [address] ONCOLOGY: [phone]                                              INTERAGENCY TRANSFER FORM - NURSING   2018                    Hospital Admission Date: 2018  CARI HICKMAN   : 1930  Sex: Male        Attending Provider: Maury Foster MD     Allergies:  Niacin    Infection:  None   Service:  GENERAL MEDI    Ht:  1.676 m (5' 6\")   Wt:  76 kg (167 lb 8.8 oz)   Admission Wt:  75 kg (165 lb 5.5 oz)    BMI:  27.04 kg/m 2   BSA:  1.88 m 2            Patient PCP Information     Provider PCP Type    [facility]      Current Code Status     Date Active Code Status Order ID Comments User Context       Prior      Code Status History     Date Active Date Inactive Code Status Order ID Comments User Context    2018  8:26 PM  DNR/DNI [id number]  Maury Foster MD Outpatient    2018  4:11 PM 2018  8:26 PM DNR/DNI [id number]  Maury Foster MD Inpatient    10/17/2018  3:23 PM 2018  4:11 PM DNR/DNI [id number]  Narendra Polanco PA-C Outpatient    10/14/2018 10:39 PM 10/17/2018  3:23 PM DNR/DNI [id number] Code change per Geno Ty RN Inpatient    10/12/2018 10:33 PM 10/14/2018 10:39 PM Full Code [id number] Discussed with son Kenny who is the POA per his report Tono Martínez, DO Inpatient      Advance Directives        Scanned docmt in ACP Activity?           No scanned doc        Hospital Problems as of 2018              Priority Class Noted POA    UTI (urinary tract infection) Medium  2018 Yes      Non-Hospital Problems as of 2018              Priority Class Noted    S/P TURP Medium  Unknown    S/P CABG (coronary artery bypass graft) Medium  Unknown    S/P AAA repair Medium  Unknown    Prostate cancer (H) Medium  Unknown    Inguinal hernia Medium  Unknown    Hyperlipidemia Medium  Unknown    GERD (gastroesophageal reflux disease) Medium  Unknown    CHF (congestive " heart failure) (H) Medium  Unknown    Carotid artery disease (H) Medium  Unknown    Cardiomyopathy (H) Medium  Unknown    CAD (coronary artery disease) Medium  Unknown    Atrial fibrillation (H) Medium  Unknown    AICD (automatic cardioverter/defibrillator) present Medium  Unknown    Abdominal aortic aneurysm (H) Medium  Unknown    Paroxysmal VT (H) Medium  Unknown    Hematoma Medium  10/12/2018      Immunizations     Name Date      Influenza (High Dose) 3 valent vaccine 09/26/18          END      ASSESSMENT     Discharge Profile Flowsheet     EXPECTED DISCHARGE     FINAL RESOURCES      Expected Discharge Date  11/29/18 ([facility] vs [facility] Care [facility]) 11/28/18 1406   Resources List  Assisted Living 10/18/18 1616    DISCHARGE NEEDS ASSESSMENT     SKIN      Equipment Currently Used at Home  walker, rolling 11/27/18 1547   Inspection of bony prominences  Full 11/29/18 1230    # of Referrals Placed by CTS  Post Acute Facilities 10/18/18 1417   Full except areas not inspected   Buttock, left;Buttock, right 11/26/18 1003    GASTROINTESTINAL (ADULT,PEDIATRIC,OB)     Inspection under devices  Full 11/29/18 1230    GI WDL  ex;GI symptoms 11/29/18 0915   Skin WDL  ex 11/29/18 1230    Last Bowel Movement  11/26/18 11/29/18 0915   Skin Color/Characteristics  bruised (ecchymotic);pale 11/29/18 0915    GI Signs/Symptoms  fecal incontinence 11/29/18 0915   Skin Integrity  scar(s) 11/29/18 1230    Passing flatus  yes 11/29/18 0044   SAFETY      COMMUNICATION ASSESSMENT     Safety WDL  WDL 11/29/18 1230    Patient's communication style  spoken language (English or Bilingual) 11/25/18 0928   All Alarms  alarm(s) activated and audible 11/29/18 0915                 Assessment WDL (Within Defined Limits) Definitions           Safety WDL     Effective: 09/28/15    Row Information: <b>WDL Definition:</b> Bed in low position, wheels locked; call light in reach; upper side rails up x 2; ID band on<br> <font  "color=\"gray\"><i>Item=AS safety wdl>>List=AS safety wdl>>Version=F14</i></font>      Skin WDL     Effective: 09/28/15    Row Information: <b>WDL Definition:</b> Warm; dry; intact; elastic; without discoloration; pressure points without redness<br> <font color=\"gray\"><i>Item=AS skin wdl>>List=AS skin wdl>>Version=F14</i></font>      Vitals     Vital Signs Flowsheet     QUICK ADDS     Pain Orientation  Right 11/29/18 0909    Quick Adds  Comments 11/28/18 1006   Pain Intervention(s)  Medication (See eMAR) 11/29/18 0909    COMMENTS     Response to Interventions  Absence of nonverbal indicators of pain 11/28/18 1411    Comments  with activity on 0.5L 11/28/18 1023   ANALGESIA SIDE EFFECTS MONITORING      VITAL SIGNS     Side Effects Monitoring: Respiratory Quality  R 11/29/18 0909    Temp  96.8  F (36  C) 11/29/18 1226   Side Effects Monitoring: Respiratory Depth  N 11/29/18 0909    Temp src  Oral 11/29/18 1226   Side Effects Monitoring: Sedation Level  1 11/29/18 0909    Resp  16 11/29/18 1226   TELMA COMA SCALE      Pulse  66 11/26/18 1929   Best Eye Response  4-->(E4) spontaneous 11/29/18 0044    Heart Rate  70 11/29/18 1226   Best Motor Response  6-->(M6) obeys commands 11/29/18 0044    Pulse/Heart Rate Source  Monitor 11/29/18 1226   Best Verbal Response  4-->(V4) confused 11/29/18 0044    BP  128/45 11/29/18 1226   Telma Coma Scale Score  14 11/29/18 0044    BP Location  Left arm 11/29/18 1226   HEIGHT AND WEIGHT      OXYGEN THERAPY     Height  1.676 m (5' 6\") 11/25/18 0937    SpO2  94 % 11/29/18 1226   Height Method  Estimated 11/25/18 0937    O2 Device  None (Room air) 11/29/18 1226   Weight  76 kg (167 lb 8.8 oz) 11/29/18 0235    Oxygen Delivery  1 LPM 11/29/18 0803   Weight Method  Bed scale 11/29/18 0235    PAIN/COMFORT     Bed Scale  Standard (fitted sheet, draw sheet/ pad, cover/flat sheet, blanket, two pillows) 11/28/18 0623    Patient Currently in Pain  yes 11/29/18 0909   Estimated Weight (From " ED)  478.5 kg (1055 lb) 11/25/18 0937    Preferred Pain Scale  number (Numeric Rating Pain Scale) 11/29/18 0909   POSITIONING      Patient's Stated Pain Goal  No pain 11/27/18 1741   Body Position  supine, head elevated 11/29/18 1005    0-10 Pain Scale  9 11/28/18 0918   Head of Bed (HOB)  HOB at 20 degrees 11/29/18 1005    rFLACC Pain Rating: Face  1-->occasional grimace or frown, withdrawn, disinterested, appears sad or worried 11/28/18 1810   Positioning/Transfer Devices  pillows;in use 11/29/18 1005    rFLACC Pain Rating - Legs  1-->uneasy, restless, tense, occasional tremors 11/28/18 1810   Chair  Upright in chair 11/28/18 1245    rFLACC Pain Rating: Activity  1-->squirming, shifting back and forth, tense, mildly agitated (e.g., head back and forth, aggression): shallow, splinting respirations, intermittent signs 11/28/18 1810   DAILY CARE      rFLACC Pain Rating - Cry  1-->moans or whimpers: occasional complaint, occasional verbal outburst or grunt 11/28/18 1810   Activity Management  activity adjusted per tolerance 11/29/18 1005    rFLACC Pain Rating - Consolability  1-->reassured by occasional touching, hugging, or being talked to, distractible 11/28/18 1810   Activity Assistance Provided  assistance, 2 people 11/29/18 1005    rFLACC Score: Activity  5 11/28/18 1810   Assistive Device Utilized  gait belt;walker 11/29/18 0909    Pain Location  Rib cage 11/29/18 0909                 Patient Lines/Drains/Airways Status    Active LINES/DRAINS/AIRWAYS     Name: Placement date: Placement time: Site: Days: Last dressing change:    Peripheral IV 11/25/18 Right Lower forearm 11/25/18   1300   Lower forearm   3     Pressure Injury 10/17/18 Posterior Coccyx blanchable red 10/17/18   1400    42     Pressure Injury 10/17/18 Proximal;Right Heel blanchable red 10/17/18   1400    42             Patient Lines/Drains/Airways Status    Active PICC/CVC     None            Intake/Output Detail Report     Date Intake   Output Net     Shift P.O. IV Piggyback Total Urine Total       Noc 11/27/18 2300 - 11/28/18 0659 -- -- -- -- -- 0    Day 11/28/18 0700 - 11/28/18 1459 200 -- 200 150 150 50    Leonela 11/28/18 1500 - 11/28/18 2259 -- -- -- -- -- 0    Noc 11/28/18 2300 - 11/29/18 0659 -- -- -- -- -- 0    Day 11/29/18 0700 - 11/29/18 1459 -- -- -- -- -- 0      Last Void/BM       Most Recent Value    Urine Occurrence 1 at 11/29/2018 1006    Stool Occurrence 0 at 11/27/2018 1429      Case Management/Discharge Planning     Case Management/Discharge Planning Flowsheet     REFERRAL INFORMATION     EXPECTED DISCHARGE      Did the Initial Social Work Assessment result in a Social Work Case?  Yes 11/28/18 1449   Expected Discharge Date  11/29/18 ([facility] vs [facility] Care TCU) 11/28/18 1406    Admission Type  inpatient 11/28/18 1449   FINAL RESOURCES      Arrived From  home or self-care 10/18/18 1417   Equipment Currently Used at Home  walker, rolling 11/27/18 1547    # of Referrals Placed by CTS  Post Acute Facilities 10/18/18 1417   Resources List  Assisted Living 10/18/18 1616    Reason For Consult  discharge planning 11/28/18 1449   ABUSE RISK SCREEN      Record Reviewed  clinical discipline documentation;history and physical;medical record 11/28/18 1449   QUESTION TO PATIENT:  Has a member of your family or a partner(now or in the past) intimidated, hurt, manipulated, or controlled you in any way?  no 11/25/18 0938    CTS Assigned to Case  hal reeves 11/28/18 1449   QUESTION TO PATIENT: Do you feel safe going back to the place where you are living?  yes 11/25/18 0938    LIVING ENVIRONMENT     OBSERVATION: Is there reason to believe there has been maltreatment of a vulnerable adult (ie. Physical/Sexual/Emotional abuse, self neglect, lack of adequate food, shelter, medical care, or financial exploitation)?  no 11/25/18 0938    Lives With  facility resident 11/27/18 1547   OTHER      Living Arrangements  assisted living (memory care)  11/27/18 1547   Are you depressed or being treated for depression?  No 11/26/18 2217    COPING/STRESS     HOMICIDE RISK      Major Change/Loss/Stressor  none 11/26/18 2217   Feels Like Hurting Others  no 11/25/18 0947

## 2018-11-25 NOTE — IP AVS SNAPSHOT
` [place] [address] ONCOLOGY: [phone]            Medication Administration Report for Salo Willis as of 11/29/18 1342   Legend:    Given Hold Not Given Due Canceled Entry Other Actions    Time Time (Time) Time  Time-Action       Inactive    Active    Linked        Medications 11/23/18 11/24/18 11/25/18 11/26/18 11/27/18 11/28/18 11/29/18    acetaminophen (TYLENOL) tablet 650 mg  Dose: 650 mg  Freq: EVERY 4 HOURS PRN Route: PO  PRN Reason: fever  Start: 11/25/18 1611   Admin Instructions: Alternate ibuprofen (if ordered) with acetaminophen.  Maximum acetaminophen dose from all sources = 75 mg/kg/day not to exceed 4 grams/day.    Admin. Amount: 2 tablet (2 × 325 mg tablet)  Last Admin: 11/27/18 1720  Dispense Loc:  ADS 88B       1658 (650 mg)-Given       2140 (650 mg)-Given        0139 (650 mg)-Given       1647 (650 mg)-Given        1058 (650 mg)-Given       1720 (650 mg)-Given             aspirin EC tablet 81 mg  Dose: 81 mg  Freq: DAILY Route: PO  Start: 11/26/18 0900   Admin Instructions: DO NOT CRUSH.    Admin. Amount: 1 tablet (1 × 81 mg tablet)  Last Admin: 11/29/18 0859  Dispense Loc:  ADS 88B        0812 (81 mg)-Given        0801 (81 mg)-Given        0854 (81 mg)-Given        0859 (81 mg)-Given           carvedilol (COREG) tablet 3.125 mg  Dose: 3.125 mg  Freq: 2 TIMES DAILY WITH MEALS Route: PO  Start: 11/25/18 1815   Admin. Amount: 1 tablet (1 × 3.125 mg tablet)  Last Admin: 11/29/18 0859  Dispense Loc:  ADS 88B       (1952)-Not Given       2142 (3.125 mg)-Given [C]        0812 (3.125 mg)-Given       1808 (3.125 mg)-Given        0802 (3.125 mg)-Given       1806 (3.125 mg)-Given [C]        0854 (3.125 mg)-Given       1823 (3.125 mg)-Given        0859 (3.125 mg)-Given       [ ] 1800           furosemide (LASIX) injection 20 mg  Dose: 20 mg  Freq: DAILY Route: IV  Start: 11/26/18 0900   Admin Instructions: For ordered IV doses 1-40 mg, give IV Push undiluted over 1-2 minutes.     Admin. Amount: 20 mg = 2 mL Conc: 10 mg/mL  Last Admin: 11/29/18 0901  Dispense Loc:  ADS 88B  Infused Over: 1-2 Minutes  Volume: 2 mL        0812 (20 mg)-Given        0801 (20 mg)-Given        0854 (20 mg)-Given        0901 (20 mg)-Given           lisinopril (PRINIVIL/Zestril) tablet 2.5 mg  Dose: 2.5 mg  Freq: EVERY EVENING Route: PO  Start: 11/25/18 1815   Admin. Amount: 1 tablet (1 × 2.5 mg tablet)  Last Admin: 11/28/18 1627  Dispense Loc:  ADS 88B       (1952)-Not Given       2142 (2.5 mg)-Given [C]        1641 (2.5 mg)-Given        1720 (2.5 mg)-Given        1627 (2.5 mg)-Given        [ ] 1700           melatonin tablet 1 mg  Dose: 1 mg  Freq: AT BEDTIME PRN Route: PO  PRN Reason: sleep  Start: 11/25/18 1611   Admin Instructions: Do not give unless at least 6 hours of uninterrupted sleep is expected.    Admin. Amount: 1 tablet (1 × 1 mg tablet)  Dispense Loc:  ADS 88B               mirtazapine (REMERON) tablet TABS 7.5 mg  Dose: 7.5 mg  Freq: AT BEDTIME Route: PO  Start: 11/25/18 2200   Admin. Amount: 1 tablet (1 × 7.5 mg tablet)  Last Admin: 11/28/18 2027  Dispense Loc:  ADS 88B       2140 (7.5 mg)-Given        2022 (7.5 mg)-Given        2014 (7.5 mg)-Given        2027 (7.5 mg)-Given        [ ] 2000           naloxone (NARCAN) injection 0.1-0.4 mg  Dose: 0.1-0.4 mg  Freq: EVERY 2 MIN PRN Route: IV  PRN Reason: opioid reversal  Start: 11/25/18 1611   Admin Instructions: For respiratory rate LESS than or EQUAL to 8.  Partial reversal dose:  0.1 mg titrated q 2 minutes for Analgesia Side Effects Monitoring Sedation Level of 3 (frequently drowsy, arousable, drifts to sleep during conversation).Full reversal dose:  0.4 mg bolus for Analgesia Side Effects Monitoring Sedation Level of 4 (somnolent, minimal or no response to stimulation).  For ordered IV doses 0.1-2mg give IVP. Give each 0.4mg over 15 seconds in emergency situations. For non-emergent situations further dilute in 9mL of NS to facilitate  titration of response.    Admin. Amount: 0.1-0.4 mg = 0.25-1 mL Conc: 0.4 mg/mL  Dispense Loc:  [facility] 88B  Volume: 1 mL               ondansetron (ZOFRAN-ODT) ODT tab 4 mg  Dose: 4 mg  Freq: EVERY 6 HOURS PRN Route: PO  PRN Reasons: nausea,vomiting  Start: 11/25/18 1611   Admin Instructions: This is Step 1 of nausea and vomiting management.  If nausea not resolved in 15 minutes, go to Step 2 prochlorperazine (COMPAZINE). Do not push through foil backing. Peel back foil and gently remove. Place on tongue immediately. Administration with liquid unnecessary  With dry hands, peel back foil backing and gently remove tablet; do not push oral disintegrating tablet through foil backing; administer immediately on tongue and oral disintegrating tablet dissolves in seconds; then swallow with saliva; liquid not required.    Admin. Amount: 1 tablet (1 × 4 mg tablet)  Dispense Loc:  [facility] 88B              Or  ondansetron (ZOFRAN) injection 4 mg  Dose: 4 mg  Freq: EVERY 6 HOURS PRN Route: IV  PRN Reasons: nausea,vomiting  Start: 11/25/18 1611   Admin Instructions: This is Step 1 of nausea and vomiting management.  If nausea not resolved in 15 minutes, go to Step 2 prochlorperazine (COMPAZINE).  Irritant. For ordered IV doses 0.1-4 mg, give IV Push undiluted over 2-5 minutes.    Admin. Amount: 4 mg = 2 mL Conc: 4 mg/2 mL  Dispense Loc:  [facility] 88B  Infused Over: 2-5 Minutes  Volume: 2 mL               oxyCODONE IR (ROXICODONE) half-tab 2.5 mg  Dose: 2.5 mg  Freq: EVERY 4 HOURS PRN Route: PO  PRN Comment: pain  Start: 11/25/18 1759   Admin. Amount: 1 half-tab (1 × 2.5 mg half-tab)  Last Admin: 11/29/18 1012  Dispense Loc:  [facility] 88B        2037 (2.5 mg)-Given         0252 (2.5 mg)-Given       0849 (2.5 mg)-Given       1736 (2.5 mg)-Given        1012 (2.5 mg)-Given           polyethylene glycol (MIRALAX/GLYCOLAX) Packet 17 g  Dose: 17 g  Freq: DAILY PRN Route: PO  PRN Reason: constipation  Start: 11/25/18 1611   Admin Instructions:  Give in 8oz of  water, juice, or soda. Hold for loose stools.  This is the second step of a three step constipation treatment.  1 Packet = 17 grams. Mixed prescribed dose in 8 ounces of water. Follow with 8 oz. of water.    Admin. Amount: 17 g  Dispense Loc:  ADS 88B               potassium chloride (KLOR-CON) Packet 20-40 mEq  Dose: 20-40 mEq  Freq: EVERY 2 HOURS PRN Route: ORAL OR FEED  PRN Reason: potassium supplementation  Start: 11/25/18 1611   Admin Instructions: Use if unable to tolerate tablets.  If Serum K+ 3.0-3.3, dose = 60 mEq po total dose (40 mEq x1 followed in 2 hours by 20 mEq x1). Recheck K+ level 4 hours after dose and the next AM.  If Serum K+ 2.5-2.9, dose = 80 mEq po total dose (40 mEq Q2H x2). Recheck K+ level 4 hours after dose and the next AM.  If Serum K+ less than 2.5, See IV order.  Dissolve packet contents in 4-8 ounces of cold water or juice.    Admin. Amount: 20-40 mEq  Dispense Loc:  ADS 88B               potassium chloride 10 mEq in 100 mL intermittent infusion with 10 mg lidocaine  Dose: 10 mEq  Freq: EVERY 1 HOUR PRN Route: IV  PRN Reason: potassium supplementation  Start: 11/25/18 1611   Admin Instructions: Infuse via PERIPHERAL LINE. Use potassium with lidocaine for pain with peripheral administration.  If Serum K+ 3.0-3.3, dose = 10 mEq/hr x4 doses (40 mEq IV total dose). Recheck K+ level 2 hours after dose and the next AM.  If Serum K+ less than 3.0, dose = 10 mEq/hr x6 doses (60 mEq IV total dose). Recheck K+ level 2 hours after dose and the next AM.    Admin. Amount: 10 mEq = 100 mL Conc: 10 mEq/100 mL  Dispense Loc: Contact Rx for dose  Infused Over: 1 Hours  Volume: 100 mL               potassium chloride 10 mEq in 100 mL sterile water intermittent infusion (premix)  Dose: 10 mEq  Freq: EVERY 1 HOUR PRN Route: IV  PRN Reason: potassium supplementation  Start: 11/25/18 1611   Admin Instructions: Infuse via PERIPHERAL LINE or CENTRAL LINE. Use for central line replacement  if patient weight less than 65 kg, if patient is on TPN with high potassium content or if unit does not stock 20 mEq bags.   If Serum K+ 3.0-3.3, dose = 10 mEq/hr x4 doses (40 mEq IV total dose). Recheck K+ level 2 hours after dose and the next AM.   If Serum K+ less than 3.0, dose = 10 mEq/hr x6 doses (60 mEq IV total dose). Recheck K+ level 2 hours after dose and the next AM.    Admin. Amount: 10 mEq = 100 mL Conc: 10 mEq/100 mL  Dispense Loc: Contact Rx for dose  Infused Over: 60 Minutes  Volume: 100 mL               potassium chloride 20 mEq in 50 mL intermittent infusion  Dose: 20 mEq  Freq: EVERY 1 HOUR PRN Route: IV  PRN Reason: potassium supplementation  Start: 11/25/18 1611   Admin Instructions: Infuse via CENTRAL LINE Only. May need EKG if less than 65 kg or on TPN - Max rate is 0.3 mEq/kg/hr for patients not on EKG monitoring.   If Serum K+ 3.0-3.3, dose = 20 mEq/hr x2 doses (40 mEq IV total dose). Recheck K+ level 2 hours after dose and the next AM.  If Serum K+ less than 3.0, dose = 20 mEq/hr x3 doses (60 mEq IV total dose). Recheck K+ level 2 hours after dose and the next AM.    Admin. Amount: 20 mEq = 50 mL Conc: 20 mEq/50 mL  Dispense Loc: Contact Rx for dose  Infused Over: 1.5 Hours  Volume: 50 mL               potassium chloride SA (K-DUR/KLOR-CON M) CR tablet 20-40 mEq  Dose: 20-40 mEq  Freq: EVERY 2 HOURS PRN Route: PO  PRN Reason: potassium supplementation  Start: 11/25/18 1611   Admin Instructions: Use if able to take PO.   If Serum K+ 3.0-3.3, dose = 60 mEq po total dose (40 mEq x1 followed in 2 hours by 20 mEq x1). Recheck K+ level 4 hours after dose and the next AM.  If Serum K+ 2.5-2.9, dose = 80 mEq po total dose (40 mEq Q2H x2). Recheck K+ level 4 hours after dose and the next AM.  If Serum K+ less than 2.5, See IV order.  DO NOT CRUSH    Admin. Amount: 1-2 tablet (1-2 × 20 mEq tablet)  Dispense Loc:  ADS 88B               ranitidine (ZANTAC) tablet 150 mg  Dose: 150 mg  Freq: 2 TIMES  DAILY BEFORE MEALS Route: PO  Start: 11/25/18 1815   Admin. Amount: 1 tablet (1 × 150 mg tablet)  Last Admin: 11/29/18 0858  Dispense Loc: [facility] 88B       (1953)-Not Given       2142 (150 mg)-Given [C]        0812 (150 mg)-Given       1642 (150 mg)-Given        0801 (150 mg)-Given       1720 (150 mg)-Given        0854 (150 mg)-Given       1627 (150 mg)-Given        0858 (150 mg)-Given       [ ] 1630           rivastigmine (EXELON) capsule 1.5 mg  Dose: 1.5 mg  Freq: 2 TIMES DAILY WITH MEALS Route: PO  Start: 11/25/18 1815   Admin. Amount: 1 capsule (1 × 1.5 mg capsule)  Last Admin: 11/29/18 0858  Dispense Loc: [facility] 88B       2140 (1.5 mg)-Given        0812 (1.5 mg)-Given       1808 (1.5 mg)-Given        0801 (1.5 mg)-Given       1806 (1.5 mg)-Given        0854 (1.5 mg)-Given       1704 (1.5 mg)-Given        0858 (1.5 mg)-Given       [ ] 1800           sucralfate (CARAFATE) tablet 1 g  Dose: 1 g  Freq: 4 TIMES DAILY Route: PO  Start: 11/25/18 1815   Admin Instructions: Recommended to take before meals.    Admin. Amount: 1 tablet (1 × 1 g tablet)  Last Admin: 11/29/18 1330  Dispense Loc:  ADS 88B       (1952)-Not Given       2142 (1 g)-Given [C]        0815 (1 g)-Given       1236 (1 g)-Given       1808 (1 g)-Given       2022 (1 g)-Given        0802 (1 g)-Given       (1423)-Not Given [C]       1806 (1 g)-Given       2014 (1 g)-Given        0854 (1 g)-Given       1350 (1 g)-Given       1704 (1 g)-Given       2027 (1 g)-Given        0859 (1 g)-Given       1330 (1 g)-Given       [ ] 1800       [ ] 2000           traZODone (DESYREL) tablet 50 mg  Dose: 50 mg  Freq: AT BEDTIME Route: PO  Start: 11/25/18 2200   Admin. Amount: 1 tablet (1 × 50 mg tablet)  Last Admin: 11/28/18 2027  Dispense Loc:  ADS 88B       2141 (50 mg)-Given        2022 (50 mg)-Given        2014 (50 mg)-Given        2027 (50 mg)-Given        [ ] 2000          Completed Medications  Medications 11/23/18 11/24/18 11/25/18 11/26/18 11/27/18 11/28/18  18         Dose: 20 mg  Freq: ONCE Route: IV  Start: 18 1545   End: 18   Admin Instructions: For ordered IV doses 1-40 mg, give IV Push undiluted over 1-2 minutes.    Admin. Amount: 20 mg = 2 mL Conc: 10 mg/mL  Last Admin: 18  Dispense Loc: [facility] 88B  Infused Over: 1-2 Minutes  Administrations Remainin  Volume: 2 mL          1704 (20 mg)-Given              Dose: 20 mEq  Freq: ONCE Route: PO  Start: 18 1600   End: 18   Admin. Amount: 20 mEq = 15 mL Conc: 20 mEq/15 mL  Last Admin: 18  Dispense Loc: [facility] 88B  Administrations Remainin  Volume: 15 mL          1704 (20 mEq)-Given           Discontinued Medications  Medications 18         Dose: 1 g  Freq: EVERY 24 HOURS Route: IV  Indications of Use: URINARY TRACT INFECTION  Last Dose: 1 g (18 1423)  Start: 18 1400   End: 18   Admin. Amount: 1 g  Last Admin: 18  Dispense Loc: [facility] 88B  Infused Over: 15-30 Minutes  Volume: 10 mL        1418 (1 g)-New Bag        1423 (1 g)-New Bag       1-Med Discontinued           Dose: 20 mEq  Freq: ONCE Route: PO  Start: 18 1600   End: 18   Admin Instructions: DO NOT CRUSH    Admin. Amount: 1 tablet (1 × 20 mEq tablet)  Administrations Remainin          1546-Med Discontinued

## 2018-11-25 NOTE — ED NOTES
Patient ambulated approx 20' with [facility] assist of 2, using walker and a gait belt. Slow while walking and quite winded at the end of the walk. /63 and O2 sats are 92% on RA

## 2018-11-25 NOTE — IP AVS SNAPSHOT
"[place] [address] ONCOLOGY: [phone]                                              INTERAGENCY TRANSFER FORM - PHYSICIAN ORDERS   2018                    Hospital Admission Date: 2018  CARI HICKMAN   : 1930  Sex: Male        Attending Provider: Maury Foster MD     Allergies:  Niacin    Infection:  None   Service:  GENERAL MEDI    Ht:  1.676 m (5' 6\")   Wt:  76 kg (167 lb 8.8 oz)   Admission Wt:  75 kg (165 lb 5.5 oz)    BMI:  27.04 kg/m 2   BSA:  1.88 m 2            Patient PCP Information     Provider PCP Type    [facility]      ED Clinical Impression     Diagnosis Description Comment Added By Time Added    Urinary tract infection without hematuria, site unspecified [N39.0] Urinary tract infection without hematuria, site unspecified [N39.0]  Sathya Trinh MD 2018  2:39 PM    Ischemic cardiomyopathy [I25.5] Ischemic cardiomyopathy [I25.5]  Sathya Trinh MD 2018  2:39 PM    Multiple falls [R29.6] Multiple falls [R29.6]  Sathya Trinh MD 2018  3:20 PM      Hospital Problems as of 2018              Priority Class Noted POA    UTI (urinary tract infection) Medium  2018 Yes      Non-Hospital Problems as of 2018              Priority Class Noted    S/P TURP Medium  Unknown    S/P CABG (coronary artery bypass graft) Medium  Unknown    S/P AAA repair Medium  Unknown    Prostate cancer (H) Medium  Unknown    Inguinal hernia Medium  Unknown    Hyperlipidemia Medium  Unknown    GERD (gastroesophageal reflux disease) Medium  Unknown    CHF (congestive heart failure) (H) Medium  Unknown    Carotid artery disease (H) Medium  Unknown    Cardiomyopathy (H) Medium  Unknown    CAD (coronary artery disease) Medium  Unknown    Atrial fibrillation (H) Medium  Unknown    AICD (automatic cardioverter/defibrillator) present Medium  Unknown    Abdominal aortic aneurysm (H) Medium  Unknown    Paroxysmal VT (H) " Medium  Unknown    Hematoma Medium  10/12/2018      Code Status History     Date Active Date Inactive Code Status Order ID Comments User Context    11/27/2018  8:26 PM  DNR/DNI [id number]  Maury Foster MD Outpatient    11/25/2018  4:11 PM 11/27/2018  8:26 PM DNR/DNI [id number]  Maury Foster MD Inpatient    10/17/2018  3:23 PM 11/25/2018  4:11 PM DNR/DNI [id number]  Narendra Polanco PA-C Outpatient    10/14/2018 10:39 PM 10/17/2018  3:23 PM DNR/DNI [id number] Code change per Geno Ty RN Inpatient    10/12/2018 10:33 PM 10/14/2018 10:39 PM Full Code [id number] Discussed with nahum Cox who is the POA per his report Tono Martínez, DO Inpatient         Medication Review      CONTINUE these medications which may have CHANGED, or have new prescriptions. If we are uncertain of the size of tablets/capsules you have at home, strength may be listed as something that might have changed.        Dose / Directions Comments    furosemide 20 MG tablet   Commonly known as:  LASIX   This may have changed:    - when to take this  - reasons to take this   Used for:  Ischemic cardiomyopathy        Dose:  20 mg   Take 1 tablet (20 mg) by mouth daily   Quantity:  90 tablet   Refills:  0        oxyCODONE HCl 5 MG Taba   Commonly known as:  OXAYDO   This may have changed:  when to take this   Used for:  Rib pain on right side        Dose:  2.5 mg   Take 2.5 mg by mouth every 6 hours as needed (pain) 5mg tab x 0.5 tab = 2.5mg   Quantity:  30 each   Refills:  0          CONTINUE these medications which have NOT CHANGED        Dose / Directions Comments    acetaminophen 500 MG tablet   Commonly known as:  TYLENOL        Dose:  1000 mg   Take 1,000 mg by mouth 3 times daily as needed for pain   Refills:  0        aspirin 81 MG EC tablet   Used for:  S/P CABG (coronary artery bypass graft)        Dose:  81 mg   Take 1 tablet (81 mg) by mouth daily   Quantity:  90 tablet   Refills:  3        atorvastatin  10 MG tablet   Commonly known as:  LIPITOR        Dose:  10 mg   Take 10 mg by mouth daily   Refills:  0        BALBIR PROTECT EX        Apply topically 2 times daily as needed (pressure sores)   Refills:  0        carvedilol 6.25 MG tablet   Commonly known as:  COREG        Dose:  3.125 mg   Take 3.125 mg by mouth 2 times daily (with meals) 6.25 mg x 0.5 tabs= 3.125mg   Refills:  0        cholecalciferol 1000 units (25 mcg) capsule   Commonly known as:  VITAMIN D3        Dose:  2 capsule   Take 2 capsules by mouth daily   Refills:  0        lisinopril 2.5 MG tablet   Commonly known as:  PRINIVIL/Zestril   Used for:  S/P CABG (coronary artery bypass graft), Chronic atrial fibrillation (H)        Dose:  2.5 mg   Take 1 tablet (2.5 mg) by mouth daily   Quantity:  90 tablet   Refills:  3        mirtazapine 7.5 mg Tabs   Commonly known as:  REMERON        Dose:  7.5 mg   Take 7.5 mg by mouth At Bedtime   Refills:  0        ondansetron 8 MG ODT tab   Commonly known as:  ZOFRAN-ODT        Dose:  8 mg   Take 8 mg by mouth every 8 hours as needed for nausea   Refills:  0        polyethylene glycol packet   Commonly known as:  MIRALAX/GLYCOLAX   Used for:  Constipation, unspecified constipation type        Dose:  17 g   Take 17 g by mouth daily   Quantity:  30 packet   Refills:  0    Future refills by PCP [facility] with phone number None.       ranitidine 150 MG tablet   Commonly known as:  ZANTAC   Used for:  Gastroesophageal reflux disease without esophagitis        Dose:  150 mg   Take 1 tablet (150 mg) by mouth 2 times daily (before meals)   Quantity:  60 tablet   Refills:  0    Future refills by PCP [facility] with phone number None.       rivastigmine 1.5 MG capsule   Commonly known as:  EXELON        Dose:  1.5 mg   Take 1.5 mg by mouth 2 times daily (with meals)   Refills:  0        STOOL SOFTENER 100 MG tablet   Generic drug:  docusate sodium        Dose:  100 mg   Take 100  mg by mouth every evening   Refills:  0        sucralfate 1 GM tablet   Commonly known as:  CARAFATE   Used for:  Dysphagia, unspecified type        Dose:  1 g   Take 1 tablet (1 g) by mouth 4 times daily   Quantity:  40 tablet   Refills:  1    Future refills by PCP [facility] with phone number None.       traZODone 50 MG tablet   Commonly known as:  DESYREL        Dose:  50 mg   Take 50 mg by mouth At Bedtime   Refills:  0                  Further instructions from your care team       Discharge to home with [facility] services. The staff will call to schedule the first visit. Their phone number is [phone].      Summary of Visit     Reason for your hospital stay       Fluid overload, UTI             After Care     Activity       Your activity upon discharge: activity as tolerated.       Diet       Follow this diet upon discharge: Orders Placed This Encounter      Room Service      Combination Diet Dysphagia Diet Level 3: Advanced; Thin Liquids (water, ice chips, juice, milk gelatin, ice cream, etc) (No straws.)             Referrals     Home Care PT Referral for Hospital Discharge       PT to eval and treat  Start of service 11/30/18  Your provider has ordered home care - physical therapy. If you have not been contacted within 2 days of your discharge please call the department phone number listed on the top of this document.       Home care nursing referral       RN skilled nursing visit. RN to assess vital signs and weight, respiratory and cardiac status, pain level and activity tolerance, hydration, nutrition and bowel status and home safety.  Start of service 11/30/18  Your provider has ordered home care nursing services. If you have not been contacted within 2 days of your discharge please call the inpatient department phone number at [phone] .              MD face to face encounter       Documentation of Face to Face and Certification for Home Health Services    I  certify that patient: Salo Willis is under my care and that I, or a nurse practitioner or physician's assistant working with me, had a face-to-face encounter that meets the physician face-to-face encounter requirements with this patient on: 11/27/2018.    This encounter with the patient was in whole, or in part, for the following medical condition, which is the primary reason for home health care: Fluid overload, weakness and deconditioning.    I certify that, based on my findings, the following services are medically necessary home health services: Nursing and Physical Therapy.    My clinical findings support the need for the above services because: Nurse is needed: To assess hydration ( signs fluid overload or over diuresis ) orthostatic BP, respiratory status after changes in medications or other medical regimen.. and Physical Therapy Services are needed to assess and treat the following functional impairments: decreased endurance.    Further, I certify that my clinical findings support that this patient is homebound (i.e. absences from home require considerable and taxing effort and are for medical reasons or [employer] services or infrequently or of short duration when for other reasons) because: Requires assistance of another person or specialized equipment to access medical services because patient: Is unable to operate assistive equipment on their own. and Requires supervision of another for safe transfer...    Based on the above findings. I certify that this patient is confined to the home and needs intermittent skilled nursing care, physical therapy and/or speech therapy.  The patient is under my care, and I have initiated the establishment of the plan of care.  This patient will be followed by a physician who will periodically review the plan of care.  Physician/Provider to provide follow up care: Danica Lomax Physician    Attending [facility] physician (the Medicare certified [facility] provider):  Maury Foster  Physician Signature: See electronic signature associated with these discharge orders.  Date: 11/27/2018                  Follow-Up Appointment Instructions     Future Labs/Procedures    Follow-up and recommended labs and tests      Comments:    Follow up with primary care provider, [facility], within 7 days to evaluate medication change and for hospital follow- up.  The following labs/tests are recommended: BMP prior to follow up.      Follow-Up Appointment Instructions     Follow-up and recommended labs and tests        Follow up with primary care provider, [facility], within 7 days to evaluate medication change and for hospital follow- up.  The following labs/tests are recommended: BMP prior to follow up.             Statement of Approval     Ordered          11/29/18 1222  I have reviewed and agree with all the recommendations and orders detailed in this document.  EFFECTIVE NOW     Approved and electronically signed by:  Maury Foster MD

## 2018-11-25 NOTE — ED NOTES
Bed: ED26  Expected date:   Expected time:   Means of arrival:   Comments:  Marlen 416 Fall back pain 88 male

## 2018-11-26 NOTE — PLAN OF CARE
Problem: Patient Care Overview  Goal: Plan of Care/Patient Progress Review  Outcome: No Change  Pt slept for most of shift. Alert to self only. Does moan with movement, Tylenol given for pain. Can be incontinent, but also used urinal. On 1L oxygen as he desats with sleeping. Did not get OOB this shift, would presume he is up with A2.

## 2018-11-26 NOTE — PLAN OF CARE
Problem: Patient Care Overview  Goal: Plan of Care/Patient Progress Review  Discharge Planner SLP   Patient plan for discharge: Did not discuss  Current status: Pt seen for dysphagia evaluation. RN not aware of any difficulty - stated he ate, drank and took pills without incident. Pt had been recommended dysphagia diet 2 and thin liquids by our SLP department in 10/2018, but he denies being on a modified diet at his place of residence. Pt was assessed with thin liquids, puree, and regular solids. Oral phase is functional in bolus acceptance. Slightly prolonged mastication, bolus formation and oral clearance, but functional given time and cues to take sips and refrain from talking while eating. No overt s/sx of aspiration, changes in breath sounds or vocal quality. Pt denies any pain or globus sensation.     Recommend regular solids and thin liquids - pt should be sitting upright, take small bites/sips, alternate solids and liquids, refrain from talking while eating and stay sitting upright for 30 minutes following oral intake.     Barriers to return to prior living situation: None per SLP  Recommendations for discharge: Previous memory care  Rationale for recommendations: SLP follow up as warranted as next level of care to ensure diet tolerance       Entered by: Nicole Calderon 11/26/2018 4:08 PM

## 2018-11-26 NOTE — PROGRESS NOTES
[facility]    Hospitalist Progress Note  Date of Service (when I saw the patient): 11/26/2018    Assessment & Plan   Salo Willsi is an 88-year-old male with medical history significant for chronic A. fib, dementia, CAD and history of CABG, CHF and recurrent falls in the past , was brought to the ER for evaluation of fall.  He was found to have UTI and was admitted for further management on 11/25/18.      Urinary tract infection:   Patient did report some urinary symptoms including dysuria and frequency, UA was abnormal, had UTI in the past but did not see any positive culture, was started on Rocephin in the ER, will continue.  Follow urine culture.  Remains afebrile.  Monitor for any urinary retention or gross hematuria.    Recurrent falls: patient has history of recurrent falls suspected due to generalized deconditioning, suspect at this time was due to worsened weakness from underlying UTI and also possibly volume overload.  No prodrome of dizziness or palpitation.  Denies chest pain, no loss of consciousness.  Unlikely seizure.  Denies any significant worsening of pain, though he had generalized pain, which is chronic. Treat UTI as above.  Completed PT eval.  Fall precaution.  He is not on any anticoagulation at this point.     Coronary artery disease with history of CABG and type 2 MI.   CHF with preserved EF  Patient was noted to have airway secretion and gurgling and he was not clearing the secretions. Also history of dysphagia. Chest x-ray appears clear. His legs are edematous, which they are always and at baseline right is more swollen than left, since vein was harvested from the Rt leg. Daughter-in-law reported on admission that swelling has worsened. He takes Lasix p.r.n.  His weight at discharge a month ago was 140 pounds.    -Weight here is 165 pounds now.  -Continue Lasix 20 mg IV daily  -Monitor intake and output is able, weight, fluid restriction and  monitor BMP.    -Monitor  "respiratory status, he is on room air.  -Not following troponin, no further cardiac workup after reviewing with family.     Chronic atrial fibrillation:  Patient with an underlying rhythm of atrial flutter, but rate controlled on admission.    -PTA Coreg 6.25 mg p.o. b.i.d. Continued.  - PTA aspirin, Lipitor and lisinopril continued.     Dementia.  PTA rivastigmine will be continued.     Hypernatremia, mild: Sodium 147, monitor.    DVT Prophylaxis: Pneumatic Compression Devices  Code Status: DNR/DNI    Disposition: Expected discharge: 1-2 days pending urine culture, further diuresis.  Discussed with patient, will follow up with family when available.  Faustino with nursing staff.    Maury Foster MD  Hospitalist    Interval History   No acute events overnight, he just worked with PT this morning and is up in the chair.  Feels dyspneic, remains on room air.  Complains his back is sore and \"not so good\".  Pain controlled with Tylenol.  Remains afebrile.  Otherwise denies chest pain, nausea, abdominal pain, diarrhea.    -Data reviewed today: I reviewed all new labs and imaging results over the last 24 hours. I personally reviewed no images or EKG's today.    Physical Exam   Temp: 95.2  F (35.1  C) Temp src: Oral BP: 139/48 Pulse: 88 Heart Rate: 64 Resp: 14 SpO2: 97 % O2 Device: Nasal cannula Oxygen Delivery: 1 LPM  Vitals:    11/26/18 0709   Weight: 75 kg (165 lb 5.5 oz)     Vital Signs with Ranges  Temp:  [95.2  F (35.1  C)-98.3  F (36.8  C)] 95.2  F (35.1  C)  Pulse:  [68-88] 88  Heart Rate:  [64-75] 64  Resp:  [14-18] 14  BP: (114-143)/(42-65) 139/48  SpO2:  [89 %-97 %] 97 %  I/O last 3 completed shifts:  In: 240 [P.O.:240]  Out: -     Constitutional: Alert, awake.  Up in chair, not in distress.   HEENT: PERRLA EOMI, mucosa is pale.  Respiratory: Bilateral equal air entry, by basilar crackles, no wheezing, no respiratory distress.  On room air  Cardiovascular: Regular s1s2, systolic murmur 2/6. No tachycardia.  GI: " Soft, non distended, non tender, bowel tones active.  Skin/Integumen: No rash, no blister.  Pale.  Neuro/Psych:    Alert, awake, oriented to self, knows he is in hospital.  Calm and appropriate, follows verbal commands appropriately.  No cranial nerve deficit.      Medications       aspirin  81 mg Oral Daily     carvedilol  3.125 mg Oral BID w/meals     cefTRIAXone  1 g Intravenous Q24H     furosemide  20 mg Intravenous Daily     lisinopril  2.5 mg Oral QPM     mirtazapine  7.5 mg Oral At Bedtime     ranitidine  150 mg Oral BID AC     rivastigmine  1.5 mg Oral BID w/meals     sucralfate  1 g Oral 4x Daily     traZODone  50 mg Oral At Bedtime       Data     Recent Labs  Lab 11/26/18  0730 11/25/18  1045   WBC 7.3 8.0   HGB 10.1* 10.9*   * 104*    172   INR  --  1.18*   * 147*   POTASSIUM 4.0 4.3   CHLORIDE 108 108   CO2 33* 37*   BUN 20 19   CR 0.75 0.80   ANIONGAP 6 2*   LUCIEN 8.2* 8.1*   GLC 83 105*   ALBUMIN  --  2.3*   PROTTOTAL  --  6.1*   BILITOTAL  --  0.4   ALKPHOS  --  85   ALT  --  30   AST  --  26   TROPI  --  0.048*       Recent Results (from the past 24 hour(s))   Lumbar spine XR, 2-3 views    Narrative    LUMBAR SPINE TWO TO THREE VIEWS  11/25/2018 11:51 AM     COMPARISON: None    HISTORY: Pain post fall.       Impression    IMPRESSION: There is normal alignment of the lumbar vertebrae.  Vertebral body heights of the lumbar spine are normal. Lumbar  intervertebral disc space heights are within normal limits. There is  no evidence for fracture of the lumbar spine. There is degenerative  facet arthropathy at the L4-L5 and L5-S1 levels.    LUIS A ANTHONY MD   XR Chest 1 View    Narrative    CHEST ONE VIEW 11/25/2018 11:52 AM     COMPARISON: Frontal chest x-ray 10/12/2018    HISTORY: Fall       Impression    IMPRESSION: Median sternotomy changes again noted. A dual-lead  pacemaker module implanted over the left chest with the leads in the  right atrium and right ventricle is again noted  without identifiable  change.    Moderate cardiomegaly again noted. There are no airspace opacities to  suggest pneumonia. There is no pleural effusion or pneumothorax.    LUIS A ANTHONY MD   XR Pelvis w Hip Right 1 View    Narrative    PELVIS AND HIP RIGHT ONE VIEW 11/25/2018 11:53 AM     COMPARISON: None    HISTORY: Pain post fall.     FINDINGS: The visualized bones and joint spaces are within normal  limits.      Impression    IMPRESSION: No evidence for fracture, dislocation or significant  degenerative change of the pelvis or right hip.    LUIS A ANTHONY MD

## 2018-11-26 NOTE — H&P
Admitted:     11/25/2018      CHIEF COMPLAINT:  Fall and weakness.      HISTORY OF PRESENT ILLNESS:  Salo Willis is an 88-year-old male with significant cardiac history including coronary artery disease and 4-vessel CABG, atrial fibrillation and paroxysmal VT, AICD in place, abdominal aortic aneurysm, cardiomyopathy, carotid artery disease, diastolic CHF, recurrent falls, who was brought to the ER for evaluation after an episode of fall last night.  He lives at memory care unit and has some additional home care as well. I discussed with the patient and his daughter-in-law in the room, reviewed chart including recent admission and discharge summaries from a month ago and also discussed with Dr. Trinh, ED attending, for further evaluation.      According to the patient, he was in the bathroom yesterday and he was feeling weak and he slowly went to the ground.  Denies loss of consciousness or any significant impact during fall.  Because he could not get up, he was lying down there on the floor, waiting for help, which he thinks was about 3 to 4 hours.  Daughter-in-law at bedside reported that he has home care and even in the memory care that he resides, they check him almost every 2 hours, but yesterday being a holiday weekend, they were under staffed/there were new staff who don't know him well and she thinks he was not checked for several hours like usual. Unclear as to how long he was on the floor though he states 3-4 hours. The patient also reports some burning urination and increased frequency.  No hematuria.  Denies nausea, vomiting, diarrhea.  He reports he is sore all over, which is slightly worsened since he fell, but not any specific area that is more painful.  Denied chest pain.  Is afebrile.      The patient was recently admitted in the hospital for right hip hematoma secondary to unwitnessed mechanical fall.  He has had several episodes of fall.  He was on anticoagulation and he even used to  take Plavix up until a month ago, which was discontinued given the hematoma.  Daughter-in-law reports his legs are more swollen now and he appears more congested.  He feels fatigued and reports increased shortness of breath with minimal movement.  When he was found on the floor, his walker was not by him.  The patient thinks he wanted to go to the bathroom quick and did not use the walker.      In the ER, the patient was evaluated by Dr. Jeff Trinh.  Lab workup revealed abnormal UA with more than 182 white cells, RBCs and large leukocyte esterase as well as moderate blood.  Nitrite is negative.  CMP showed mild hypernatremia with a sodium of 147.  Troponin was 0.048, which is chronically elevated.  Chest x-ray did not show any pulmonary vascular congestion.  EKG showed atrial flutter, but rate controlled.  Multiple x-rays including the pelvis and spine were negative for fracture.  The patient received Lasix 20 mg IV once in the ER and also Rocephin 1 gram IV and he is being admitted for further evaluation.  He was desatting to 89% on room air in the ER.      REVIEW OF SYSTEMS:  All 10-point systems were reviewed and are negative other than mentioned in the HPI.      PAST MEDICAL HISTORY:   1.  Coronary artery disease with history of 4-vessel CABG and type 2 MI subsequently.   2.  Aortic root dilatation.   3.  Abdominal aortic aneurysm, status post repair.   4.  Prostate cancer, status post TURP.   5.  Atrial fibrillation, paroxysmal VT, has AICD in place.   6.  Cardiomyopathy.   7.  GERD.   8.  Hyperlipidemia   9.  Carotid artery disease.   10.  Recurrent falls and right hip hematoma.   11.  Anemia due to blood loss.   12.  Dementia.   13.  Dysphagia.   14.  Chronic constipation.      ALLERGIES:  NIACIN.      SOCIAL HISTORY:  Former smoker, no alcohol or recreational drug use.  Lives at memory care unit.      FAMILY HISTORY:  Reviewed and noncontributory at this point.       PHYSICAL EXAMINATION:   GENERAL:  The  patient is alert, awake.  He knows he is in hospital, thought his daughter-in-law brought him to the ER though he was brought by EMS.  He appears fatigued and he is pale, otherwise does not appear to be in distress.   HEENT:  Pupils are equal, round, reactive to light.  Oral mucosa moist.   NECK:  Supple.   LUNGS:  Bilateral equal air entry, coarse, conducted breath sounds heard from secretions in his upper airway and in his throat.  The patient does not seem to clear his secretions in his oropharyngeal cavity and often a gurgling could be heard.  He is not coughing though.  Does not appear to be in respiratory distress.   CARDIOVASCULAR:  S1, S2 regular, systolic murmur noted.  No tachycardia.   ABDOMEN:  Soft, nontender, bowel sounds active.   MUSCULOSKELETAL:  Bilateral 2+ pitting pedal edema.  Right is slightly bigger than left, nontender.   SKIN:  Pale.   NEUROLOGIC:  Alert, oriented to his date of birth, place, recognizes his daughter in life and he is calm, appropriate and cooperative.  Cranial nerves II-XII intact.  Strength symmetrical.      LABORATORY DATA:  WBC 8, hemoglobin 10.9, hematocrit 34.8, platelet 172, .  Sodium 147, potassium 4.3, chloride 108, bicarbonate 37, BUN 19, creatinine 80.  Calcium 8.1, anion gap 2, albumin 2.3, total protein 6.1.  LFTs normal.  Troponin less than 0.048.  UA has plenty of WBCs, also has RBCs, large leukocyte esterase and moderate blood.  Nitrite is negative.      IMAGING:  Chest x-ray was reviewed by me, which does not show any pulmonary congestion, but does have cardiomegaly.  Report of x-ray hip, pelvis and lumbar spine was reviewed, negative for any fracture.      A 12-lead EKG shows atrial flutter, rate controlled.      ASSESSMENT AND PLAN:  Salo Willis is an 88-year-old male who was brought to the ER for evaluation of fall.  He has multiple above-mentioned medical issues.   1.  Urinary tract infection.  The patient does have some symptoms including  dysuria and frequency, UA was abnormal, had UTI in the past but did not see any positive culture, was started on Rocephin in the ER, will continue.  Follow urine culture.   2.  Recurrent falls.  The patient has history of recurrent falls suspected due to his underlying UTI  at this time.  No prodrome of dizziness or palpitation.  Denies chest pain.  Denies loss of consciousness.  Unlikely seizure.  Denies any significant worsening of pain, though he had generalized pain, which is chronic.  Treat UTI as above.  PT eval.  Fall risk.  He is not on any anticoagulation at this point.   3.  Coronary artery disease with history of CABG and type 2 MI.   4.  Congestive heart failure with preserved ejection fraction.  The patient has upper airway gurgling, does not clear secretion, also reported dysphagia.  Chest x-ray appears clear.  Will get proBNP.  His legs are edematous, which they are always, but per daughter-in-law have worsened.  He takes Lasix p.r.n.  His weight at discharge a month ago was 140 pounds.  I have not checked his weight here yet.  He has received a dose of Lasix 20 mg IV in the ER.  I will continue 20 mg IV daily.  Cautiously diurese checking his orthostatic in a.m. and following his creatinine.  Monitor intake and output as able and daily weight.  The patient has mildly abnormal troponin, but it was higher throughout his troponin checks last time.  I do not suspect a cardiac event at this point.  Also, given his recurrent fall, inability to tolerate any antiplatelet and dementia, family also does not want to pursue any extensive cardiac testing.  No need to follow up troponin.   5.  Chronic atrial fibrillation.  Patient currently seems to have an underlying rhythm of atrial flutter, but rate controlled.  He is on Coreg 6.25 mg p.o. b.i.d. We will continue.  Also, his prior to admission baby aspirin, Lipitor and lisinopril will be continued.   6.  Dementia.  PTA rivastigmine will be continued.   7.  Deep  venous thrombosis prophylaxis:  PCDs.      CODE STATUS:  DNR/DNI.  This was discussed with patient's daughter-in-law and reviewed in Epic.      Anticipate 2 days of hospital stay.         MARIANN BEY MD             D: 2018   T: 2018   MT: ELAYNE      Name:     CARI HICKMAN   MRN:      [id number]        Account:      [id number]   :      1930        Admitted:     2018                   Document: [id number]

## 2018-11-26 NOTE — PROGRESS NOTES
11/26/18 1500   General Information   Onset Date 11/25/18   Start of Care Date 11/26/18   Referring Physician Maury Foster MD   Patient/Family Goals Statement None stated   Swallowing Evaluation Bedside swallow evaluation   Behaviorial Observations WFL (within functional limits);Confused   Mode of current nutrition Oral diet   Type of oral diet Thin liquid;Regular   Respiratory Status Room air   Comments Salo Willis is an 88-year-old male with medical history significant for chronic A. fib, dementia, CAD and history of CABG, CHF and recurrent falls in the past , was brought to the ER for evaluation of fall.  He was found to have UTI and was admitted for further management on 11/25/18.  Pt was seen in October of this year with recommendations for dysphagia diet 2 and thin liquids and home health. He denies continuing softer foods and states he eats and drinks without any indications of dysphagia. No family present during evaluation    Clinical Swallow Evaluation   Oral Musculature generally intact   Structural Abnormalities none present   Dentition present and adequate   Mucosal Quality adequate   Oral Labial Strength and Mobility WFL   Lingual Strength and Mobility WFL   Laryngeal Function Cough;Swallow;Voicing initiated   Additional Documentation Yes   Swallow Eval   Feeding Assistance minimal assistance required   Clinical Swallow Eval: Thin Liquid Texture Trial   Mode of Presentation, Thin Liquids cup;spoon;self-fed   Volume of Liquid or Food Presented 4 oz    Oral Phase of Swallow WFL   Pharyngeal Phase of Swallow intact   Diagnostic Statement No overt s/sx of aspiraiton    Clinical Swallow Eval: Puree Solid Texture Trial   Mode of Presentation, Puree spoon;fed by clinician   Volume of Puree Presented 4 oz    Oral Phase, Puree WFL   Pharyngeal Phase, Puree intact   Diagnostic Statement Adequate oral manipulation, no s/sx of aspiration    Clinical Swallow Eval: Solid Food Texture Trial   Mode of  Presentation, Solid self-fed   Volume of Solid Food Presented 1 cracker    Oral Phase, Solid WFL  (Mastication is somewhat prolonged but functional )   Pharyngeal Phase, Solid intact   Diagnostic Statement Adequate manipulation. no s/sx of aspiration    Swallow Compensations   Swallow Compensations Alternate viscosity of consistencies;Pacing;Reduce amounts   Esophageal Phase of Swallow   Patient reports or presents with symptoms of esophageal dysphagia Yes   Esophageal comments Intermittent belching   General Therapy Interventions   Planned Therapy Interventions Dysphagia Treatment   Dysphagia treatment Instruction of safe swallow strategies   Swallow Eval: Clinical Impressions   Skilled Criteria for Therapy Intervention Skilled criteria met.  Treatment indicated.   Functional Assessment Scale (FAS) 6   Treatment Diagnosis Minimal oropharyngeal dysphagia    Diet texture recommendations Regular diet;Thin liquids   Recommended Feeding/Eating Techniques alternate between small bites and sips of food/liquid;check mouth frequently for oral residue/pocketing;small sips/bites;no straws   Therapy Frequency 5 times/wk   Predicted Duration of Therapy Intervention (days/wks) follow up x 1?   Anticipated Discharge Disposition extended care facility   Risks and Benefits of Treatment have been explained. Yes   Patient, family and/or staff in agreement with Plan of Care Yes   Clinical Impression Comments Pt seen for dysphagia evaluation. RN not aware of any difficulty - stated he ate, drank and took pills without incident. Pt had been recommended dysphagia diet 2 and thin liquids by our SLP department in 10/2018, but he denies being on a modified diet at his place of residence. Pt was assessed with thin liquids, puree, and regular solids. Oral phase is functional in bolus acceptance. Slightly prolonged mastication, bolus formation and oral clearance, but functional given time and cues to take sips and refrain from talking while  eating. No overt s/sx of aspiration, changes in breath sounds or vocal quality. Pt denies any pain or globus sensation. Recommend regular solids and thin liquids - pt should e sitting upright, take small bites/sips, alternate solids and liquids, refrain from talking while eating and stay sitting upright for 30 minutes following oral intake.    Total Evaluation Time   Total Evaluation Time (Minutes) 25

## 2018-11-26 NOTE — PLAN OF CARE
Problem: Patient Care Overview  Goal: Plan of Care/Patient Progress Review  Outcome: No Change  Alert to self only. Combination regular diet. Up with 2A/walker, painful/weak. Tylenol given for comfort.  1L O2 nasal cannula for low sats while sleeping. Powder to groin. Can be incontinent at times, but will use urinal as well. IV S/L. PCDs on.

## 2018-11-26 NOTE — PLAN OF CARE
Problem: Patient Care Overview  Goal: Plan of Care/Patient Progress Review  Outcome: No Change  Disoriented to place, otherwise oriented and cooperative. Forgetful. VSS. Sats 92 on RA. PHILLIPS. Denies pain. Coarse LS. 2 BLE edema. Incontinent at times. Up to chair and BR with assist of 1, walker and gait belt. On IV antibiotics. Continue to monitor.

## 2018-11-27 NOTE — PLAN OF CARE
Problem: Patient Care Overview  Goal: Plan of Care/Patient Progress Review  Discharge Planner SLP   Patient plan for discharge: Not able to state.  Current status: SLP: Patient seen for swallow treatment with his daughter in law present for part of the session. She stated he does not do well with tomato based foods due to heartburn. Patient has both oral pharyngeal and esophageal dysphagia documented on a video swallow study 10/18. He demonstrated overt Sx of aspiration with thin liquids via the straw. Tolerated small sips of water via the cup but can not rule out penetration. He does not have molars and mastication is prolonged for solids with mild to moderate amount of oral residue and suspect increased pharyngeal residue. Delayed coughing and expelling phelgm. Recommend: 1. Down grade diet to DDL 3 with thin liquids. 2. Upright or best in a chair and remain there for at least 30-60 minutes. NO STRAWS, small bites/sips and alternate liquids/solids. 3. SLP will f/u for diet tolerance.   Barriers to return to prior living situation: Deconditioning  Recommendations for discharge: Defer to PT needs.  Rationale for recommendations: Continue short term ST needs for swallowing to ensure strict implementation of swallow strategies.        Entered by: Juany Shearer 11/27/2018 10:24 AM

## 2018-11-27 NOTE — PLAN OF CARE
Problem: Patient Care Overview  Goal: Plan of Care/Patient Progress Review  Outcome: No Change  Pt disoriented x3. VSS on 2L. Pt has some pain with repositioning and getting out of bed. Pt A2 with GB and walker. Pt incontinent of urine, no BM. Pt on DD3 diet, but does not have much of an appetite. Up in chair for lunch, but did not eat any of his lunch. Encouraging fluids. IV SL ex intermittent abx. Will continue to monitor.

## 2018-11-27 NOTE — PLAN OF CARE
Problem: Patient Care Overview  Goal: Plan of Care/Patient Progress Review  OT: order received and chart reviewed and spoke with nursing. Call made to daughter Geno to get patients prior level of function as patient with documented dementia.   Discharge Planner OT   Patient plan for discharge: none stated  Current status: At baseline patient lives in memory care unit [facility]. He was ambulating with a walker and gait belt, and ideally only with staff. But he has been getting up on occasion and walks from recliner to bathroom on his own. Which is what he did when he fell, note says he had left his walker behind.  Daughter states that she pays for 2 hour checks to take him to the bathroom, AM/PM dressing cares, assist with grooming and for patient to have whirlpool bath with staff several times a week. Pt is currently working with home OT, daughter reports OT is working on getting patient a w/c. No current acute OT needs as patient receives assist with all ADL's/IADL's at facility. Pt is working with home OT to obtain w/c and caregiver training to maximize safety with functional mobility. Recommend pt continue with home OT as addressing any deficits in home environment is more beneficial for patient due to dementia dx.  Will defer mobility and LE strengthening to Physical therapy while in the hospital.   Barriers to return to prior living situation: fall risk  Recommendations for discharge: [facility] with assist for all transfers as patient has had several falls that have occurred when he is unsupervised  Rationale for recommendations: will complete OT orders.        Entered by: Ruba Pedroza 11/27/2018 11:50 AM

## 2018-11-27 NOTE — PLAN OF CARE
Problem: Infection, Risk/Actual (Adult)  Goal: Infection Prevention/Resolution  Patient will demonstrate the desired outcomes by discharge/transition of care.   Outcome: Improving   Patient disoriented to time, situation. VSS on room air, patient complains of pain in R side with movement, turning in bed is very difficult due to this pain. Tylenol given x1. Patient can be incontinent at times. Patient up with 2 due to weakness. Discharge pending.

## 2018-11-27 NOTE — PROGRESS NOTES
Per chart review, pt resides at [facility].  Called Sofia TOPETE at [facility] and left message regarding pt's current care level and services he receives at the [facility].  Await call back.      Addendum: Received call back from Sofia at [facility].  Per Sofia, pt receives assistance with all cares.  Pt is able to feed self.  Per Sofia they are able to take patient back with resumption of CH RN & PT/OT in place.  Per Sofia, any new medications will need to be filled at [facility], discharge orders need faxed to [phone].

## 2018-11-27 NOTE — PLAN OF CARE
Problem: Patient Care Overview  Goal: Plan of Care/Patient Progress Review  Outcome: No Change  Alert to self, pleasantly confused. VSS on 2 L NC overnight, desats to 87 on RA. C/o right back pain, given oxycodone x 1 with moderate relief. Bruising throughout. Incontinent overnight x 3. IV SL. A2, stood at bedside. Turn and repo.

## 2018-11-27 NOTE — PROGRESS NOTES
[facility]  Patient is currently open to home care services with [facility]. The patient is currently receiving Skilled Nursing/PT/OT services. [facility]  and team have been notified of patient admission. [facility] liaison will continue to follow patient during stay. If appropriate provide orders to resume home care at time of discharge.

## 2018-11-27 NOTE — PLAN OF CARE
Problem: Patient Care Overview  Goal: Plan of Care/Patient Progress Review  Discharge Planner PT   Patient plan for discharge: not stated  Current status: PT: Orders received; Initial evaluation completed and treatment initiated as able; At baseline patient lives in a memory care facility and has been ambulatory with a walker and ideally a staff member but patient has been getting up and moving on his own resulting in many recent falls (per OT conversation with daughter); Patient has dementia; On eval patient was oriented to self and at times knew he was in the hospital and then also thought he was at his memory care facility. Patient needing max A for rolling to L and supine to sit bed mobility secondary to weakness and R sided report of rib pain with transition movement; unable to come to stand from bed height even with max A; decreased use of R UE to push secondary to rib pain; mod A with bed elevated to assist standing; use of a walker; able to ambulate a short distance with rolling walker (10 feet) and min A; O2 sats stable at 95-96% at rest and with activity while on 2L; transferred to bedside chair with min/mod A of 1 and safety cues to get to sitting; max A x 2 to come to stand from chair height currently. Dependent assist x 2 to return sit>supine at end of session.  Barriers to return to prior living situation: Current level of assist; R sided rib pain with mobility; high risk of continued falls; need for 24/7 assist for all mobility  Recommendations for discharge: TCU with possible transition to a more supportive living environment; Patient needs 24/7 assist of 1-2 for all mobility currently; If family declines TCU, recommend 24/7 assist for all mobility and Home PT.  Rationale for recommendations: Patient would benefit from ongoing PT to address deficits with weakness; decreased activity tolerance, and impaired functional mobility to maximize return to baseline level of function and decrease current  burden of care.       Entered by: Leslie White 11/27/2018 3:31 PM

## 2018-11-27 NOTE — PROGRESS NOTES
[facility]    Hospitalist Progress Note  Date of Service (when I saw the patient): 11/27/2018    Assessment & Plan   Salo Willis is an 88-year-old male with medical history significant for chronic A. fib, dementia, CAD and history of CABG, CHF and recurrent falls in the past , was brought to the ER for evaluation of fall.  He was found to have UTI and was admitted for further management on 11/25/18.      Urinary tract infection:   Patient did report some urinary symptoms including dysuria and frequency, UA was abnormal, had UTI in the past but did not see any positive culture, was started on Rocephin in the ER.  - Urine culture grew lisa, complete 3 doses.    Recurrent falls: patient has history of recurrent falls suspected due to generalized deconditioning, suspect at this time was due to worsened weakness from underlying UTI and also possibly volume overload.  No prodrome of dizziness or palpitation.  Denies chest pain, no loss of consciousness.  Unlikely seizure.  Denies any significant worsening of pain, though he had generalized pain, which is chronic. Treating UTI as above. PT eval.  Fall precaution.  He is not on any anticoagulation at this point.     Coronary artery disease with history of CABG and type 2 MI.   CHF with preserved EF  Patient was noted to have airway secretion and gurgling and he was not clearing the secretions. Also history of dysphagia. Chest x-ray appears clear. His legs are edematous, which they are always and at baseline right is more swollen than left, since vein was harvested from the Rt leg. Daughter-in-law reported on admission that swelling has worsened. He takes Lasix p.r.n.  His weight at discharge a month ago was 140 pounds. Weight here is 165 pounds now.  -Continue Lasix 20 mg IV daily, Not on O2 prior, needing upto 1-2 L. Pulse ox while ambulating, if stable, will change to PO in am and plan discharge.   -Monitor intake and output is able, weight,  fluid restriction and  monitor BMP.    -Not following troponin, no further cardiac workup after reviewing with family.     Chronic atrial fibrillation:  Patient with an underlying rhythm of atrial flutter, but rate controlled on admission.    -PTA Coreg 6.25 mg p.o. b.i.d. Continued.  - PTA aspirin, Lipitor and lisinopril continued.     Dementia.  PTA rivastigmine will be continued.     Hypernatremia, mild: Sodium 147-->145 today     DVT Prophylaxis: Pneumatic Compression Devices  Code Status: DNR/DNI    Disposition: Expected discharge: Possibly 1 more day of IV diuresis. Discussed with patient's daughter in law in the room. Reviewed with ANGELA.     Maury Foster MD  Hospitalist    Interval History    Patient was seen and evaluated, no upper airway gurgling noted, he feels breathing is better.  Desaturated to 87% on room air per nursing report.  On 2 L nasal cannula currently.  No acute issues otherwise.  Daughter-in-law at bedside.       -Data reviewed today: I reviewed all new labs and imaging results over the last 24 hours. I personally reviewed no images or EKG's today.    Physical Exam   Temp: 97.6  F (36.4  C) Temp src: Axillary BP: 130/45 Pulse: 66 Heart Rate: 85 Resp: 18 SpO2: 98 % O2 Device: Nasal cannula Oxygen Delivery: 3 LPM  Vitals:    11/26/18 0709   Weight: 75 kg (165 lb 5.5 oz)     Vital Signs with Ranges  Temp:  [95.8  F (35.4  C)-97.6  F (36.4  C)] 97.6  F (36.4  C)  Pulse:  [66-72] 66  Heart Rate:  [68-85] 85  Resp:  [14-18] 18  BP: (124-137)/(45-57) 130/45  SpO2:  [87 %-98 %] 98 %  I/O last 3 completed shifts:  In: 720 [P.O.:720]  Out: 60 [Urine:60]    Constitutional: Alert, awake. Awake.   HEENT: PERRLA EOMI, mucosa is pale.  Respiratory: Bilateral equal air entry, bibasilar crackles but no wheezing, no respiratory distress. Remains on room air  Cardiovascular: Regular s1s2, systolic murmur 2/6. No tachycardia.  GI: Soft, non distended, non tender, bowel tones active.  Skin/Integumen: No rash,  no blister.  Pale.  Neuro/Psych:  Alert, awake, oriented to self, knows he is in hospital.  Calm and appropriate, follows verbal commands appropriately.  No cranial nerve deficit.      Medications       aspirin  81 mg Oral Daily     carvedilol  3.125 mg Oral BID w/meals     cefTRIAXone  1 g Intravenous Q24H     furosemide  20 mg Intravenous Daily     lisinopril  2.5 mg Oral QPM     mirtazapine  7.5 mg Oral At Bedtime     ranitidine  150 mg Oral BID AC     rivastigmine  1.5 mg Oral BID w/meals     sucralfate  1 g Oral 4x Daily     traZODone  50 mg Oral At Bedtime       Data     Recent Labs  Lab 11/27/18  0658 11/26/18  0730 11/25/18  1045   WBC  --  7.3 8.0   HGB  --  10.1* 10.9*   MCV  --  103* 104*   PLT  --  153 172   INR  --   --  1.18*   * 147* 147*   POTASSIUM 3.8 4.0 4.3   CHLORIDE 106 108 108   CO2 35* 33* 37*   BUN 19 20 19   CR 0.74 0.75 0.80   ANIONGAP 4 6 2*   LUCIEN 8.1* 8.2* 8.1*   * 83 105*   ALBUMIN  --   --  2.3*   PROTTOTAL  --   --  6.1*   BILITOTAL  --   --  0.4   ALKPHOS  --   --  85   ALT  --   --  30   AST  --   --  26   TROPI  --   --  0.048*       No results found for this or any previous visit (from the past 24 hour(s)).

## 2018-11-27 NOTE — PROGRESS NOTES
" 11/27/18 1505   Quick Adds   Type of Visit Initial PT Evaluation   Living Environment   Lives With facility resident   Living Arrangements assisted living  ([facility])   Home Accessibility no concerns   Number of Stairs to Enter Home 0   Number of Stairs Within Home 0   Living Environment Comment lives at [facility] at baseline   Self-Care   Usual Activity Tolerance moderate   Current Activity Tolerance poor   Equipment Currently Used at Home walker, rolling   Activity/Exercise/Self-Care Comment patient needing assist for mobility but at times getting up with walker without assist and falling   Functional Level Prior   Ambulation 1-->assistive equipment  (walker)   Transferring 3-->assistive equipment and person   Toileting 3-->assistive equipment and person   Bathing 2-->assistive person   Dressing 2-->assistive person   Cognition 1 - attention or memory deficits  (dementia)   Fall history within last six months yes   Number of times patient has fallen within last six months 6  (per chart; patient unable to state other than \"a lot\")   Which of the above functional risks had a recent onset or change? fall history   Prior Functional Level Comment patient was supposed to have assist with mobility but has fallen multiple times when getting up without assist   General Information   Onset of Illness/Injury or Date of Surgery - Date 11/25/18   Referring Physician Maury Foster MD   Patient/Family Goals Statement not stated   Pertinent History of Current Problem (include personal factors and/or comorbidities that impact the POC) per chart: Salo Willis is an 88-year-old male with medical history significant for chronic A. fib, dementia, CAD and history of CABG, CHF and recurrent falls in the past , was brought to the ER for evaluation of fall.  He was found to have UTI and was admitted for further management on 11/25/18; see medical record for further information   Precautions/Limitations fall " precautions;oxygen therapy device and L/min  (2L)   General Observations patient sleeping upon therapist arrival; agreeable to PT eval upon waking   General Info Comments Activity: up with assist   Cognitive Status Examination   Orientation orientation to person, place and time   Level of Consciousness alert   Follows Commands and Answers Questions 75% of the time   Personal Safety and Judgment impaired;at risk behaviors demonstrated   Memory impaired   Cognitive Comment baseline dementia   Pain Assessment   Patient Currently in Pain Yes, see Vital Sign flowsheet  (R sided rib pain with transition movements)   Posture    Posture Comments forward flexed in standing   Range of Motion (ROM)   ROM Comment UE's limited to about 90 degrees; Guarding of R arm movement secondary to R sided rib pain; some difficulty actively moving LE's to command   Strength   Strength Comments functional weakness from recent illness; needing significant assist for mobility; further limited by pain   Bed Mobility   Bed Mobility Comments max A for supine to sit; dependent assist to return to supine   Transfer Skills   Transfer Comments sit>stand with bed elevated and mod A; max A x 2 from standard height bed and chair height surface   Gait   Gait Comments Able to ambulate in room x 10 feet with a rolling walker and min A; SBA of another for safety   Balance   Balance Comments baseline impairment; improved with walker use; multiple recent falls   General Therapy Interventions   Planned Therapy Interventions bed mobility training;gait training;ROM;strengthening;transfer training;progressive activity/exercise   Clinical Impression   Criteria for Skilled Therapeutic Intervention yes, treatment indicated   PT Diagnosis impaired gait/transfers; weakness   Influenced by the following impairments weakness; R sided rib pain; impaired cognition; decreased activity tolerance; impaired balance   Functional limitations due to impairments impaired  "independence with functional mobility   Clinical Presentation Stable/Uncomplicated   Clinical Presentation Rationale max A of 1-2 for mobility; current environment not able to provide cares needed; medically stabilizing   Clinical Decision Making (Complexity) Low complexity   Therapy Frequency` 5 times/week   Predicted Duration of Therapy Intervention (days/wks) 3-5 days   Anticipated Equipment Needs at Discharge front wheeled walker;wheelchair   Anticipated Discharge Disposition Transitional Care Facility   Risk & Benefits of therapy have been explained Yes   Patient, Family & other staff in agreement with plan of care Yes   [facility] TM \"6 Clicks\"   2016, Trustees of [facility], under license to [employer].  All rights reserved.   6 Clicks Short Forms Basic Mobility Inpatient Short Form   [facility]  \"6 Clicks\" V.2 Basic Mobility Inpatient Short Form   1. Turning from your back to your side while in a flat bed without using bedrails? 2 - A Lot   2. Moving from lying on your back to sitting on the side of a flat bed without using bedrails? 2 - A Lot   3. Moving to and from a bed to a chair (including a wheelchair)? 2 - A Lot   4. Standing up from a chair using your arms (e.g., wheelchair, or bedside chair)? 2 - A Lot   5. To walk in hospital room? 3 - A Little   6. Climbing 3-5 steps with a railing? 1 - Total   Basic Mobility Raw Score (Score out of 24.Lower scores equate to lower levels of function) 12   Total Evaluation Time   Total Evaluation Time (Minutes) 10     "

## 2018-11-28 NOTE — PLAN OF CARE
Problem: Patient Care Overview  Goal: Plan of Care/Patient Progress Review  Pt is disoriented to place and situation, forgetful. VSS on 2L O2 via NC. C/o severe back pain - PRN Oxycodone given x1. LS diminished. +2 BLE edema. Repositioned q2h. Incontinent. DD3 with thin liquids. PIV SL. Rested well overnight.

## 2018-11-28 NOTE — PLAN OF CARE
Problem: Patient Care Overview  Goal: Plan of Care/Patient Progress Review  Outcome: No Change  [id number]: Alert to self. Confused at times. VSS on RA. Pt c/o generalized back pain, PRN tylenol given with moderate relief. More pain with activity. Incontinent of B&B. R PIV SL. Up with Ax2 with GB and walker. Refused to get in chair for dinner. Turn and RPO q2h. DD3 diet, with thin liquids, no straws, fair appetite.  Plan pending clinical progress. Will continue to monitor.

## 2018-11-28 NOTE — PROGRESS NOTES
KALEY Note:    D/I:  KALEY placed call to patient's daughter Geno to discuss therapy recommendations for memory care TCU placement.  Daughter stated that she would like to have patient return back to [facility] with [facility] as he likes the home care staff and his facility.  Daughter stated that patient would need transportation arranged back to the facility and that patient will need to be back between 10 am - 3 pm per facility request. Call placed to sam Black ([phone]) to discuss discharge plans and therapy recommendations.  Left VM.      Call placed to [facility] to set up a stretcher ride as patient has pain, confusion, would not be able to manage oxygen and is not safe to be alone in the back of the transport vehicle.  Ride established for 14:00 on 11/29/18.     Addendum: KALEY sent email to home care liaison to inform of discharge plan.     P:  KALEY will continue to follow and assist for discharge.    Miguel Angel Farley, HENRY, LGSW

## 2018-11-28 NOTE — DISCHARGE SUMMARY
[facility]    Discharge Summary  Hospitalist    Date of Admission:  11/25/2018  Date of Discharge:  11/29/2018  Discharging Provider: Maury Foster MD  Date of Service (when I saw the patient): 11/29/2018      Discharge Diagnoses   - Acute on chronic diastolic CHF  - Acute Cystitis  - Generalized weakness and recurrent falls  - Coronary artery disease with history of CABG and type 2 MI.   - Chronic A fib not on anticoagulation  - Dementia  - Hypernatremia   - Rt chest wall pain, musculoskeletal    Son is POA but daughter in law who stated she used to be an RN visits him mostly and likes updates. They have very appropriately expressed that they do not want very aggressive measures to keep him alive. If he deteriorates, they prefer palliative approach and comfort care.     History of Present Illness   Salo Willis is an 88-year-old male with medical history significant for chronic A. fib, dementia, CAD and history of CABG, CHF and recurrent falls in the past , was brought to the ER for evaluation of fall.  He was found to have UTI and was admitted for further management on 11/25/18.      Hospital Course   Salo Willis was admitted on 11/25/2018.  The following problems were addressed during his hospitalization:     Cystitis/UTI:  Patient did report some urinary symptoms including dysuria and frequency, UA was abnormal on admission with pyuria, has had UTI in the past but no positive culture. He was started on Rocephin in the ER and continued on admission. Urine culture grew lisa, completed 3 doses.     Recurrent falls: patient has history of recurrent falls suspected due to generalized deconditioning, suspected fall this time was due to worsened weakness from underlying UTI and also possibly volume overload/acute diastolic CHF. No prodrome of dizziness or palpitation.  Denies chest pain, no loss of consciousness. Unlikely seizure. Has generalized chronic pain mostly unchanged to  slightly worsened pain with movement. Worked with PT. Treated UTI as above. Fall precaution.  He is not on any anticoagulation at this point. PT recommended TCU but he was discharged back to his [facility].  -patient was c/o Rt sided chest wall pain, was managed with tylenol and he also tolerated low dose oxycodone. IS was encouraged.      Coronary artery disease with history of CABG and type 2 MI.   CHF with preserved EF  Patient was noted to have upper airway secretion and gurgling and he was not clearing the secretions. Also history of dysphagia. Chest x-ray was clear at presentation. His legs are edematous, which they are always and at baseline right is more swollen than left, since vein was harvested for CABG from the Rt leg. Daughter-in-law reported on admission that legs swelling was worse. He takes Lasix p.r.n.  His weight at discharge a month ago was 140 pounds. Weight here at presentation was noted 165 pounds.   -Treated with Lasix 20 mg IV daily, Not on O2 prior, needing upto 1-2 L. Ambulatory pulse ox done prior to discharge, needing   -discharging on Lasix 20 mg daily scheduled (used to take PRN PTA)  -Troponin was not followed as family did not want to pursue further cardiac workup.     Chronic atrial fibrillation:  Patient with an underlying rhythm of atrial flutter, but rate controlled on admission.    -PTA Coreg 6.25 mg p.o. b.i.d. Continued.  -PTA aspirin, Lipitor and lisinopril continued.      Dementia.  PTA rivastigmine continued.      Hypernatremia, mild: Sodium 147-->145     Maury Foster MD  Hospitalist    Significant Results and Procedures   XRs - chest, lumbar spines and Hip    Pending Results   These results will be followed up by none  Unresulted Labs Ordered in the Past 30 Days of this Admission     No orders found from 9/26/2018 to 11/26/2018.          Code Status   DNR / DNI       Primary Care Physician   [facility]    Constitutional: Alert, awake. Not in distress.    HEENT: PERRLA EOMI, mucosa is pale.  Respiratory: Bilateral equal air entry, bibasilar crackles but no wheezing, no respiratory distress. Remains on room air at rest.  Cardiovascular: Regular s1s2, systolic murmur 2/6. No tachycardia.  GI: Soft, non distended, non tender, bowel tones active.  Skin/Integumen: No rash, no blister.  Pale.  Neuro/Psych:  Alert, awake, oriented to self and place. Calm and appropriate, follows verbal commands appropriately.  No cranial nerve deficit.       Discharge Disposition   Discharged to [facility]  Condition at discharge: Stable    Consultations This Hospital Stay   SOCIAL WORK IP CONSULT  SPEECH LANGUAGE PATH ADULT IP CONSULT  PHYSICAL THERAPY ADULT IP CONSULT  OCCUPATIONAL THERAPY ADULT IP CONSULT  CARE TRANSITION RN/SW IP CONSULT    Time Spent on this Encounter   IMaury, personally saw the patient today and spent greater than 30 minutes discharging this patient.    Discharge Orders     Home care nursing referral     Home Care PT Referral for Hospital Discharge     Reason for your hospital stay   Fluid overload, UTI     Follow-up and recommended labs and tests    Follow up with primary care provider, [facility], within 7 days to evaluate medication change and for hospital follow- up.  The following labs/tests are recommended: BMP prior to follow up.     Activity   Your activity upon discharge: activity as tolerated.     MD face to face encounter   Documentation of Face to Face and Certification for Home Health Services    I certify that patient: Salo Willis is under my care and that I, or a nurse practitioner or physician's assistant working with me, had a face-to-face encounter that meets the physician face-to-face encounter requirements with this patient on: 11/27/2018.    This encounter with the patient was in whole, or in part, for the following medical condition, which is the primary reason for home health care: Fluid overload, weakness and  deconditioning.    I certify that, based on my findings, the following services are medically necessary home health services: Nursing and Physical Therapy.    My clinical findings support the need for the above services because: Nurse is needed: To assess hydration ( signs fluid overload or over diuresis ) orthostatic BP, respiratory status after changes in medications or other medical regimen.. and Physical Therapy Services are needed to assess and treat the following functional impairments: decreased endurance.    Further, I certify that my clinical findings support that this patient is homebound (i.e. absences from home require considerable and taxing effort and are for medical reasons or [employer] or infrequently or of short duration when for other reasons) because: Requires assistance of another person or specialized equipment to access medical services because patient: Is unable to operate assistive equipment on their own. and Requires supervision of another for safe transfer...    Based on the above findings. I certify that this patient is confined to the home and needs intermittent skilled nursing care, physical therapy and/or speech therapy.  The patient is under my care, and I have initiated the establishment of the plan of care.  This patient will be followed by a physician who will periodically review the plan of care.  Physician/Provider to provide follow up care: Danica Lomax Physician    Attending hospital physician (the Medicare certified CONSTANTIN provider): Maury Foster  Physician Signature: See electronic signature associated with these discharge orders.  Date: 11/27/2018     DNR/DNI     Diet   Follow this diet upon discharge: Orders Placed This Encounter     Room Service     Combination Diet Dysphagia Diet Level 3: Advanced; Thin Liquids (water, ice chips, juice, milk gelatin, ice cream, etc) (No straws.)       Discharge Medications   Discharge Medication List as of 11/29/2018   1:42 PM      CONTINUE these medications which have CHANGED    Details   furosemide (LASIX) 20 MG tablet Take 1 tablet (20 mg) by mouth daily, Disp-90 tablet, R-0, E-Prescribe      oxyCODONE HCl (OXAYDO) 5 MG TABA Take 2.5 mg by mouth every 6 hours as needed (pain) 5mg tab x 0.5 tab = 2.5mg, Disp-30 each, R-0, Local Print         CONTINUE these medications which have NOT CHANGED    Details   acetaminophen (TYLENOL) 500 MG tablet Take 1,000 mg by mouth 3 times daily as needed for pain, Historical      aspirin 81 MG EC tablet Take 1 tablet (81 mg) by mouth daily, Disp-90 tablet, R-3, Local Print      atorvastatin (LIPITOR) 10 MG tablet Take 10 mg by mouth daily, Historical      carvedilol (COREG) 6.25 MG tablet Take 3.125 mg by mouth 2 times daily (with meals) 6.25 mg x 0.5 tabs= 3.125mg , Historical      cholecalciferol (VITAMIN  -D) 1000 units capsule Take 2 capsules by mouth daily, Historical      docusate sodium (STOOL SOFTENER) 100 MG tablet Take 100 mg by mouth every evening , Historical      lisinopril (PRINIVIL/ZESTRIL) 2.5 MG tablet Take 1 tablet (2.5 mg) by mouth daily, Disp-90 tablet, R-3, Local Print      mirtazapine (REMERON) 7.5 mg TABS Take 7.5 mg by mouth At Bedtime , Historical      ondansetron (ZOFRAN-ODT) 8 MG ODT tab Take 8 mg by mouth every 8 hours as needed for nausea, Historical      polyethylene glycol (MIRALAX/GLYCOLAX) Packet Take 17 g by mouth daily, Disp-30 packet, R-0, E-PrescribeFuture refills by PCP Dr[facility] with phone number None.      ranitidine (ZANTAC) 150 MG tablet Take 1 tablet (150 mg) by mouth 2 times daily (before meals), Disp-60 tablet, R-0, E-PrescribeFuture refills by PCP Dr[facility] with phone number None.      rivastigmine (EXELON) 1.5 MG capsule Take 1.5 mg by mouth 2 times daily (with meals) , Historical      Skin Protectants, Misc. (BALBIR PROTECT EX) Apply topically 2 times daily as needed (pressure sores), Historical       sucralfate (CARAFATE) 1 GM tablet Take 1 tablet (1 g) by mouth 4 times daily, Disp-40 tablet, R-1, E-PrescribeFuture refills by PCP Dr[facility] with phone number None.      traZODone (DESYREL) 50 MG tablet Take 50 mg by mouth At Bedtime, Historical           Allergies   Allergies   Allergen Reactions     Niacin      Data   Most Recent 3 CBC's:  Recent Labs   Lab Test  11/26/18   0730  11/25/18   1045  10/16/18   0551   10/13/18   0556   WBC  7.3  8.0   --    --   11.7*   HGB  10.1*  10.9*  9.3*   < >  8.5*   MCV  103*  104*   --    --   97   PLT  153  172   --    --   152    < > = values in this interval not displayed.      Most Recent 3 BMP's:  Recent Labs   Lab Test  11/29/18   0715  11/28/18   0737  11/27/18   0658  11/26/18   0730   NA  144   --   145*  147*   POTASSIUM  3.6  3.5  3.8  4.0   CHLORIDE  102   --   106  108   CO2  38*   --   35*  33*   BUN  16   --   19  20   CR  0.72  0.71  0.74  0.75   ANIONGAP  4   --   4  6   LUCIEN  8.3*   --   8.1*  8.2*   GLC  87   --   101*  83     Most Recent 2 LFT's:  Recent Labs   Lab Test  11/25/18   1045  10/13/18   0556   AST  26  15   ALT  30  12   ALKPHOS  85  74   BILITOTAL  0.4  0.2     Most Recent INR's and Anticoagulation Dosing History:  Anticoagulation Dose History     Recent Dosing and Labs Latest Ref Rng & Units 10/12/2018 11/25/2018    INR 0.86 - 1.14 1.23(H) 1.18(H)        Most Recent 3 Troponin's:  Recent Labs   Lab Test  11/25/18   1045  10/15/18   0615  10/13/18   1245   TROPI  0.048*  0.174*  0.693*     Most Recent Cholesterol Panel:No lab results found.  Most Recent 6 Bacteria Isolates From Any Culture (See EPIC Reports for Culture Details):  Recent Labs   Lab Test  11/25/18   1109  10/12/18   2006  10/12/18   2005  10/12/18   1927   CULT  50,000 to 100,000 colonies/mL  mixed urogenital lisa  Susceptibility testing not routinely done    <10,000 colonies/mL  mixed urogenital lisa  Susceptibility testing not routinely done    No  growth  No growth     Most Recent TSH, T4 and A1c Labs:No lab results found.  Results for orders placed or performed during the hospital encounter of 11/25/18   XR Pelvis w Hip Right 1 View    Narrative    PELVIS AND HIP RIGHT ONE VIEW 11/25/2018 11:53 AM     COMPARISON: None    HISTORY: Pain post fall.     FINDINGS: The visualized bones and joint spaces are within normal  limits.      Impression    IMPRESSION: No evidence for fracture, dislocation or significant  degenerative change of the pelvis or right hip.    LUIS A ANTHONY MD   Lumbar spine XR, 2-3 views    Narrative    LUMBAR SPINE TWO TO THREE VIEWS  11/25/2018 11:51 AM     COMPARISON: None    HISTORY: Pain post fall.       Impression    IMPRESSION: There is normal alignment of the lumbar vertebrae.  Vertebral body heights of the lumbar spine are normal. Lumbar  intervertebral disc space heights are within normal limits. There is  no evidence for fracture of the lumbar spine. There is degenerative  facet arthropathy at the L4-L5 and L5-S1 levels.    LUIS A ANTHONY MD   XR Chest 1 View    Narrative    CHEST ONE VIEW 11/25/2018 11:52 AM     COMPARISON: Frontal chest x-ray 10/12/2018    HISTORY: Fall       Impression    IMPRESSION: Median sternotomy changes again noted. A dual-lead  pacemaker module implanted over the left chest with the leads in the  right atrium and right ventricle is again noted without identifiable  change.    Moderate cardiomegaly again noted. There are no airspace opacities to  suggest pneumonia. There is no pleural effusion or pneumothorax.    LUIS A ANTHONY MD

## 2018-11-28 NOTE — PLAN OF CARE
Problem: Patient Care Overview  Goal: Plan of Care/Patient Progress Review  SLP: Attempted to see patient for swallow treatment. He was sound asleep, aroused but declined due to fatigue. Will reschedule for 11/29/18.

## 2018-11-28 NOTE — PROGRESS NOTES
[facility]    Hospitalist Progress Note  Date of Service (when I saw the patient): 11/28/2018    Assessment & Plan   Salo Willis is an 88-year-old male with medical history significant for chronic A. fib, dementia, CAD and history of CABG, CHF and recurrent falls in the past , was brought to the ER for evaluation of fall.  He was found to have UTI and was admitted for further management on 11/25/18.      Urinary tract infection:   Patient did report some urinary symptoms including dysuria and frequency, UA was abnormal, had UTI in the past but did not see any positive culture, was started on Rocephin in the ER.  - Urine culture grew lisa, completed 3 doses.    Recurrent falls: patient has history of recurrent falls suspected due to generalized deconditioning, suspect at this time was due to worsened weakness from underlying UTI and also possibly volume overload.  No prodrome of dizziness or palpitation.  Denies chest pain, no loss of consciousness.  Unlikely seizure.  Denies any significant worsening of pain, though he had generalized pain, which is chronic. Treating UTI as above. PT eval noted, family willing to take him back to the memory care.  Fall precaution.  He is not on any anticoagulation at this point.   - Rt ribcage pain, controlled with low dose oxycodone and tylenol, and he is tolerating it.  - encourage IS  - CXR without fracture on admision.    Coronary artery disease with history of CABG and type 2 MI.   CHF with preserved EF  Patient was noted to have airway secretion and gurgling and he was not clearing the secretions. Also history of dysphagia. Chest x-ray appears clear. His legs are edematous, which they are always and at baseline right is more swollen than left, since vein was harvested from the Rt leg. Daughter-in-law reported on admission that swelling has worsened. He takes Lasix p.r.n.  His weight at discharge a month ago was 140 pounds. Weight here is 165 pounds  "now.  -Continue Lasix 20 mg IV daily, Not on O2 prior, weaned down to 0.5L, will give him additional dose of 20 mg IV lasix now.   -Pulse ox while ambulating prior to discharge. Change lasix to PO at discharge.  -Monitor intake and output as able and weight.  -Not following troponin, no further cardiac workup after reviewing with family.   -some upper airway gurgling noted intermittently since admission, s/p SLP eval, on DDL3 with thin liquid. No overt aspiration noted.     Chronic atrial fibrillation:  Patient with an underlying rhythm of atrial flutter, but rate controlled on admission.    - PTA Coreg 6.25 mg p.o. b.i.d. Continued.  - PTA aspirin, Lipitor and lisinopril continued.     Dementia.  PTA rivastigmine continued.     Hypernatremia, mild: Sodium 147-->145     DVT Prophylaxis: Pneumatic Compression Devices  Code Status: Prior    Disposition: Expected discharge: tomorrow if his memory care unit accepts, it looks like ride has been set up for 1400. Discussed with patient's daughter in law 11/27. Reviewed with RN.     Maury Foster MD  Hospitalist    Interval History    Patient reported increased Rt chest wall today, got oxycodone and when I reassessed, he felt \"fine\" and better. Denies dyspnea. No other acute issues.   Noted slightly increased upper airway secretion today. O2 weaned down to 1/2 LPM.      -Data reviewed today: I reviewed all new labs and imaging results over the last 24 hours. I personally reviewed no images or EKG's today.    Physical Exam   Temp: 96.8  F (36  C) Temp src: Oral BP: 117/50   Heart Rate: 68 Resp: 16 SpO2: 91 % O2 Device: Nasal cannula Oxygen Delivery: 1/2 LPM  Vitals:    11/26/18 0709 11/27/18 2335 11/28/18 0800   Weight: 75 kg (165 lb 5.5 oz) 75 kg (165 lb 5.5 oz) 75.2 kg (165 lb 12.6 oz)     Vital Signs with Ranges  Temp:  [96.8  F (36  C)-97.1  F (36.2  C)] 96.8  F (36  C)  Heart Rate:  [63-70] 68  Resp:  [16-18] 16  BP: (111-135)/(44-56) 117/50  SpO2:  [86 %-97 %] 91 " %  I/O last 3 completed shifts:  In: 520 [P.O.:520]  Out: -     Constitutional: Alert, awake. Awake.   HEENT: PERRLA EOMI, mucosa is pale.  Respiratory: Bilateral equal air entry, bibasilar crackles but no wheezing, no respiratory distress.   Chest; chest wall tenderness on Rt, no bruise.  Cardiovascular: Regular s1s2, systolic murmur 2/6. No tachycardia.  GI: Soft, non distended, non tender, bowel tones active.  Skin/Integumen: No rash, no blister.  Pale.  Neuro/Psych:  Alert, awake, oriented to self, knows he is in hospital.  Calm and appropriate, follows verbal commands appropriately.  No cranial nerve deficit.      Medications       aspirin  81 mg Oral Daily     carvedilol  3.125 mg Oral BID w/meals     furosemide  20 mg Intravenous Daily     lisinopril  2.5 mg Oral QPM     mirtazapine  7.5 mg Oral At Bedtime     ranitidine  150 mg Oral BID AC     rivastigmine  1.5 mg Oral BID w/meals     sucralfate  1 g Oral 4x Daily     traZODone  50 mg Oral At Bedtime       Data     Recent Labs  Lab 11/28/18  0737 11/27/18  0658 11/26/18  0730 11/25/18  1045   WBC  --   --  7.3 8.0   HGB  --   --  10.1* 10.9*   MCV  --   --  103* 104*   PLT  --   --  153 172   INR  --   --   --  1.18*   NA  --  145* 147* 147*   POTASSIUM 3.5 3.8 4.0 4.3   CHLORIDE  --  106 108 108   CO2  --  35* 33* 37*   BUN  --  19 20 19   CR 0.71 0.74 0.75 0.80   ANIONGAP  --  4 6 2*   LUCIEN  --  8.1* 8.2* 8.1*   GLC  --  101* 83 105*   ALBUMIN  --   --   --  2.3*   PROTTOTAL  --   --   --  6.1*   BILITOTAL  --   --   --  0.4   ALKPHOS  --   --   --  85   ALT  --   --   --  30   AST  --   --   --  26   TROPI  --   --   --  0.048*       No results found for this or any previous visit (from the past 24 hour(s)).

## 2018-11-28 NOTE — CONSULTS
Care Transition Initial Assessment - SW     Met with:   Active Problems:    UTI (urinary tract infection)       DATA  Lives With: facility resident  Living Arrangements: assisted living (memory care)  [facility] ([facility])  At baseline patient receives assistance with all activities of daily living and he can ambulate with a walker and assistance of one.  He is able to feed himself.       Identified issues/concerns regarding health management: Patient has had recent falls and based on nursing and therapy notes he is below his baseline.  He currently requires oxgyen.  Writer spoke with Josie Saucedo RN and Sofia  at [facility].  Explained to Josie and Sofia patient's need for oxygen and his pain limiting his movement.  Also explained PT was recommending TCU.  They both stated they recommend patient transfer to TCU prior to returning to [facility].         ASSESSMENT  Cognitive Status: oriented to person  Concerns to be addressed: below baseline.     PLAN  Discuss with discharge recommendation with daughter Geno and trace Saucedo at [facility] at [phone].  Financial costs for the patient includes to be determined  Patient given options and choices for discharge contact will be made with Geno  Patient/family is agreeable to the plan?    Patient Goals and Preferences:  Patient anticipates discharging to:

## 2018-11-28 NOTE — PLAN OF CARE
Problem: Patient Care Overview  Goal: Plan of Care/Patient Progress Review  Pt disoriented to time, place, and situation. VSS: weaned to 0.5 L. Satting 88-90% on RA at rest. Pt c/o of moderate to severe pain in R rib and back w/ activity and repositioning. PRN oxy given x1. Pt w/ minimal appetite on DD3 diet. Incontinent. BLE edema. Up with A2 + walker- up to chair for breakfast. Discharge disposition pending pain and activity.

## 2018-11-28 NOTE — PLAN OF CARE
Problem: Patient Care Overview  Goal: Plan of Care/Patient Progress Review  Outcome: No Change  A&O to self. VSS on 2L O2 at HS. Denies pain at rest, C/O back pain with movement/cares. Up with 2 walker and GB. Turn and repo q2h. DD3 diet with thin liquids. Incontinent. Possible discharge to memory care tomorrow.

## 2018-11-29 NOTE — PLAN OF CARE
Problem: Patient Care Overview  Goal: Plan of Care/Patient Progress Review  Pt is alert to self only. VSS on 1L O2, needs frequent reminders to keep O2 in. C/o pain with repositioning. +2 BLE edema, legs elevated. LS diminished. Incontinent. Repositioned q2h. DD3, thins. PIV SL.

## 2018-11-29 NOTE — PLAN OF CARE
Problem: Patient Care Overview  Goal: Plan of Care/Patient Progress Review  Discharge Planner PT   Patient plan for discharge: not stated  Current status: Pt with both legs hanging off side of bed upon arrival. Pt assisted to EOB with mod A, mild c/o of pain reported. Sit to stand w/ WW and mod/max A to come to stand at EOB. Pt ambulated with min A and WW, cued to avoid obstacles and increase step length. Up to chair w/ chair alarm on, needs within reach and nursing made aware.  Barriers to return to prior living situation: Current level of assist; R sided rib pain with mobility; high risk of continued falls; need for 24/7 assist for all mobility  Recommendations for discharge: return to memory care with home PT  Rationale for recommendations: Patient moving better today but cont to be limited by R side rib pain, should be able to discharge back to home w/ 24/7 assist/supervision for all mobility and Home PT.       Entered by: Melba Natarajan 11/29/2018 1:13 PM       Physical Therapy Discharge Summary    Reason for therapy discharge:    Discharged to long term care facility.    Progress towards therapy goal(s). See goals on Care Plan in [facility] electronic health record for goal details.  Goals not met.  Barriers to achieving goals:   limited tolerance for therapy.    Therapy recommendation(s):    Continued therapy is recommended.  Rationale/Recommendations:  to address functional mobility deficits.

## 2018-11-29 NOTE — PLAN OF CARE
Problem: Patient Care Overview  Goal: Plan of Care/Patient Progress Review  Outcome: No Change  Patient is alert to self only. VSS on 0.5L O2 via NC, patient de-stats to 87% on RA at rest. Patient verbally denied pain, FLACC scale used patient scored 0 (while lying in bed) and then 5 when attempting to change brief, patient grimacing and crying out in pain, but was unable to tell pain location, PRN Oxycodone given x1. LS diminshed. BLE +2, legs elevated, and IV lasix given this evening.Turn and reposition q2h. Coccyx pink and blanchable. Incontinent to urine x2. No BM this shift. Patient refused to eat supper this evening. Plan to discharge back to [facility] memory care unit by sumaya tomorrow at 1400. Nursing to continue to monitor.

## 2018-11-29 NOTE — PLAN OF CARE
Problem: Patient Care Overview  Goal: Plan of Care/Patient Progress Review  Outcome: Adequate for Discharge Date Met: 11/29/18  VSS, Alert and sometimes lethargic.  Oriented to self.  Incontinent of bowel and bladder.  Plan is to discharge back to [facility] today, transportation by stretcher.

## 2018-11-29 NOTE — PROGRESS NOTES
KALEY Note:    D/I:  KALEY completed PCS form, faxed to [facility] and provided to [facility].    Miguel Angel Farley, MSW, LGSW

## 2018-11-29 NOTE — PROGRESS NOTES
KALEY Discharge Note:    D/I:  Received discharge orders for patient.  Patient will be retuning back to [facility] today via [facility].  KALEY faxed orders to [phone].    P:  KALEY will continue to assist for discharge as needed.    HENRY Claros, LGSW

## 2018-11-29 NOTE — PLAN OF CARE
Problem: Patient Care Overview  Goal: Plan of Care/Patient Progress Review  Discharge Planner SLP   Patient plan for discharge: Not able to state.  Current status: Patient seen for swallow treatment. Patient given a snack of toast and juice. Premature entry of thin liquids via the cup without overt Sx of aspiration. Crust was removed from the toast. He demonstrated prolonged mastication with adequate oral clearing. No overt Sx of aspiration during the session. This appears to be the least restrictive diet for him at this time. Recommend; 1. Continue on the DDL 3 diet with thin liquids. 2. Upright, no straws, assistance, small bites/sips, and alternate liquids/solids. 3. SLP will f/u for 1 more session for tolerance and education.   Barriers to return to prior living situation: None  Recommendations for discharge: Back to memory care.  Rationale for recommendations: Do not anticipate ST needs after discharge. Patient near or at baseline diet.        Entered by: Juany Shearer 11/29/2018 8:52 AM

## 2018-11-29 NOTE — DISCHARGE INSTRUCTIONS
Discharge to home with [facility]. The staff will call to schedule the first visit. Their phone number is [phone].

## 2018-11-30 NOTE — PLAN OF CARE
Problem: Patient Care Overview  Goal: Plan of Care/Patient Progress Review  Speech Language Therapy Discharge Summary    Reason for therapy discharge:    Discharged to long term care facility.    Progress towards therapy goal(s). See goals on Care Plan in [facility] electronic health record for goal details.  Goals met    Therapy recommendation(s):    No further therapy is recommended.     At time of discharge - Recommend continuation of the DDL 3 diet with thin liquids. Upright, no straws, assistance, small bites/sips, and alternate liquids/solids. Pt is suspected to be at and tolerating his baseline diet at time of discharge of which is deemed safest and least restrictive

## 2018-12-13 NOTE — PROGRESS NOTES
Patient missed Merlin transmission on 11/28/18. Sent letter 11/30, no response. Sent 1 week letter today

## 2019-01-01 ENCOUNTER — TELEPHONE (OUTPATIENT)
Dept: CARDIOLOGY | Facility: CLINIC | Age: 84
End: 2019-01-01

## 2019-01-08 NOTE — TELEPHONE ENCOUNTER
Patient daughter in law calling to inquire what needs to be done to turn off ICD therapies. She reports her father in laws health is failing  And her  is POA ([place] so Geno is caring for Salo). He is currently in a [place] care unit. I obtained the Hospice RN name and number and called her to let her know we would need a MD order faxed to us then we could get a [facility] rep over to turn off therapies.   ANGELA Randolph  At [facility] , [phone] stated she would call a conference and put in motion what was needed. I called Geno back to let her know Tomasa would speak with her. ANGELA Chambers

## 2023-03-07 NOTE — PLAN OF CARE
Problem: Patient Care Overview  Goal: Plan of Care/Patient Progress Review  Outcome: No Change  Pt is confused with the exception to self.  VSS. On room air.Tele is Afib. Pt denies chest pain/SOB. A-2 with GB and walker. Pt is very unsteady on feet and impulsive. Did sit in chair when son was here, had refused and got irritated with nurse and grabbed arm when attempted earlier. Sat in chair for an hour. Coccyx pink blanchable, covered with foam. Turn and repo Q2H. Incontinent of bladder. Becomes agitated when re positing.  DD@ with thin liquids, must remain upright x1 hours after any food or liquids. paliative following. Plan to continue with medical care and possible re-assement of living situation, possible discharge tomorrow with increase in cares vs hospice.             yes